# Patient Record
Sex: MALE | Race: WHITE | NOT HISPANIC OR LATINO | Employment: OTHER | URBAN - METROPOLITAN AREA
[De-identification: names, ages, dates, MRNs, and addresses within clinical notes are randomized per-mention and may not be internally consistent; named-entity substitution may affect disease eponyms.]

---

## 2023-03-21 ENCOUNTER — TELEPHONE (OUTPATIENT)
Dept: PRIMARY CARE | Facility: CLINIC | Age: 63
End: 2023-03-21
Payer: COMMERCIAL

## 2023-03-21 DIAGNOSIS — G89.4 CHRONIC PAIN SYNDROME: ICD-10-CM

## 2023-03-21 DIAGNOSIS — G89.29 CHRONIC BILATERAL LOW BACK PAIN WITHOUT SCIATICA: Primary | ICD-10-CM

## 2023-03-21 DIAGNOSIS — G89.4 CHRONIC PAIN SYNDROME: Primary | ICD-10-CM

## 2023-03-21 DIAGNOSIS — M54.50 CHRONIC BILATERAL LOW BACK PAIN WITHOUT SCIATICA: Primary | ICD-10-CM

## 2023-03-21 RX ORDER — HYDROCODONE BITARTRATE AND ACETAMINOPHEN 7.5; 325 MG/1; MG/1
1 TABLET ORAL 2 TIMES DAILY
COMMUNITY
Start: 2014-04-14 | End: 2023-03-21 | Stop reason: SDUPTHER

## 2023-03-21 RX ORDER — HYDROCODONE BITARTRATE AND ACETAMINOPHEN 7.5; 325 MG/1; MG/1
1 TABLET ORAL EVERY 6 HOURS PRN
Qty: 60 TABLET | Refills: 0 | Status: SHIPPED | OUTPATIENT
Start: 2023-03-21 | End: 2023-03-21 | Stop reason: SDUPTHER

## 2023-03-21 RX ORDER — HYDROCODONE BITARTRATE AND ACETAMINOPHEN 7.5; 325 MG/1; MG/1
1 TABLET ORAL 2 TIMES DAILY PRN
Qty: 60 TABLET | Refills: 0 | Status: SHIPPED | OUTPATIENT
Start: 2023-03-21 | End: 2023-04-19 | Stop reason: SDUPTHER

## 2023-03-25 LAB — SARS-COV-2 RESULT: NOT DETECTED

## 2023-03-27 LAB
ABO GROUP (TYPE) IN BLOOD: NORMAL
ANTIBODY SCREEN: NORMAL
RH FACTOR: NORMAL

## 2023-04-19 DIAGNOSIS — M54.50 CHRONIC BILATERAL LOW BACK PAIN WITHOUT SCIATICA: ICD-10-CM

## 2023-04-19 DIAGNOSIS — G89.29 CHRONIC BILATERAL LOW BACK PAIN WITHOUT SCIATICA: ICD-10-CM

## 2023-04-19 RX ORDER — HYDROCODONE BITARTRATE AND ACETAMINOPHEN 7.5; 325 MG/1; MG/1
1 TABLET ORAL 2 TIMES DAILY PRN
Qty: 60 TABLET | Refills: 0 | Status: SHIPPED | OUTPATIENT
Start: 2023-04-19 | End: 2023-05-19

## 2023-05-15 DIAGNOSIS — R09.81 NASAL CONGESTION: Primary | ICD-10-CM

## 2023-05-15 RX ORDER — FLUTICASONE PROPIONATE 50 MCG
2 SPRAY, SUSPENSION (ML) NASAL DAILY
Qty: 16 G | Refills: 1 | Status: SHIPPED | OUTPATIENT
Start: 2023-05-15

## 2023-05-15 RX ORDER — FLUTICASONE PROPIONATE 50 MCG
2 SPRAY, SUSPENSION (ML) NASAL DAILY
COMMUNITY
Start: 2023-02-17 | End: 2023-05-15 | Stop reason: SDUPTHER

## 2023-05-19 ENCOUNTER — OFFICE VISIT (OUTPATIENT)
Dept: PRIMARY CARE | Facility: CLINIC | Age: 63
End: 2023-05-19
Payer: COMMERCIAL

## 2023-05-19 VITALS
WEIGHT: 242 LBS | DIASTOLIC BLOOD PRESSURE: 70 MMHG | SYSTOLIC BLOOD PRESSURE: 142 MMHG | HEART RATE: 83 BPM | BODY MASS INDEX: 35.74 KG/M2 | OXYGEN SATURATION: 90 %

## 2023-05-19 DIAGNOSIS — I72.5 BASILAR ARTERY ANEURYSM (CMS-HCC): ICD-10-CM

## 2023-05-19 DIAGNOSIS — G89.29 CHRONIC BILATERAL LOW BACK PAIN WITHOUT SCIATICA: Primary | ICD-10-CM

## 2023-05-19 DIAGNOSIS — M54.50 CHRONIC BILATERAL LOW BACK PAIN WITHOUT SCIATICA: Primary | ICD-10-CM

## 2023-05-19 DIAGNOSIS — J43.2 CENTRILOBULAR EMPHYSEMA (MULTI): ICD-10-CM

## 2023-05-19 DIAGNOSIS — Z95.2 S/P TAVR (TRANSCATHETER AORTIC VALVE REPLACEMENT): ICD-10-CM

## 2023-05-19 DIAGNOSIS — I10 SYSTOLIC HYPERTENSION: ICD-10-CM

## 2023-05-19 DIAGNOSIS — D64.9 ANEMIA, UNSPECIFIED TYPE: ICD-10-CM

## 2023-05-19 DIAGNOSIS — I51.89 DIASTOLIC DYSFUNCTION: ICD-10-CM

## 2023-05-19 DIAGNOSIS — E66.09 CLASS 2 OBESITY DUE TO EXCESS CALORIES WITH BODY MASS INDEX (BMI) OF 35.0 TO 35.9 IN ADULT, UNSPECIFIED WHETHER SERIOUS COMORBIDITY PRESENT: ICD-10-CM

## 2023-05-19 DIAGNOSIS — R06.09 DOE (DYSPNEA ON EXERTION): ICD-10-CM

## 2023-05-19 PROBLEM — K43.9 HERNIA, LATERAL VENTRAL: Status: ACTIVE | Noted: 2023-05-19

## 2023-05-19 PROBLEM — N40.0 BPH (BENIGN PROSTATIC HYPERPLASIA): Status: ACTIVE | Noted: 2023-05-19

## 2023-05-19 PROBLEM — L90.0 LICHEN SCLEROSUS: Status: ACTIVE | Noted: 2023-05-19

## 2023-05-19 PROBLEM — R35.0 URINARY FREQUENCY: Status: ACTIVE | Noted: 2023-05-19

## 2023-05-19 PROBLEM — I20.9 ANGINA, CLASS III (CMS-HCC): Status: ACTIVE | Noted: 2023-05-19

## 2023-05-19 PROBLEM — D46.9 MYELODYSPLASTIC SYNDROME (MULTI): Status: ACTIVE | Noted: 2023-05-19

## 2023-05-19 PROBLEM — I50.33 ACUTE ON CHRONIC HEART FAILURE WITH PRESERVED EJECTION FRACTION (MULTI): Status: ACTIVE | Noted: 2023-05-19

## 2023-05-19 PROBLEM — I25.10 CORONARY ARTERY CALCIFICATION SEEN ON CT SCAN: Status: ACTIVE | Noted: 2023-05-19

## 2023-05-19 PROBLEM — M25.529 ELBOW PAIN: Status: ACTIVE | Noted: 2023-05-19

## 2023-05-19 PROBLEM — C61 ADENOCARCINOMA OF PROSTATE (MULTI): Status: ACTIVE | Noted: 2023-05-19

## 2023-05-19 PROBLEM — R56.9 SEIZURES (MULTI): Status: ACTIVE | Noted: 2023-05-19

## 2023-05-19 PROBLEM — R10.84 ABDOMINAL PAIN, DIFFUSE: Status: ACTIVE | Noted: 2023-05-19

## 2023-05-19 PROBLEM — L02.92 FURUNCLE: Status: ACTIVE | Noted: 2023-05-19

## 2023-05-19 PROBLEM — R53.83 FATIGUE: Status: ACTIVE | Noted: 2023-05-19

## 2023-05-19 PROBLEM — H49.00 THIRD CRANIAL NERVE PALSY: Status: ACTIVE | Noted: 2023-05-19

## 2023-05-19 PROBLEM — J44.9 COPD (CHRONIC OBSTRUCTIVE PULMONARY DISEASE) (MULTI): Status: ACTIVE | Noted: 2023-05-19

## 2023-05-19 PROBLEM — R73.9 ACUTE HYPERGLYCEMIA: Status: ACTIVE | Noted: 2023-05-19

## 2023-05-19 PROBLEM — K59.03 DRUG-INDUCED CONSTIPATION: Status: ACTIVE | Noted: 2023-05-19

## 2023-05-19 PROBLEM — R51.9 HEADACHE: Status: ACTIVE | Noted: 2023-05-19

## 2023-05-19 PROBLEM — H40.20X0 NARROW ANGLE GLAUCOMA OF RIGHT EYE: Status: ACTIVE | Noted: 2023-05-19

## 2023-05-19 PROBLEM — I35.0 AORTIC STENOSIS, SEVERE: Status: ACTIVE | Noted: 2023-05-19

## 2023-05-19 PROBLEM — I35.1 MODERATE AORTIC REGURGITATION: Status: ACTIVE | Noted: 2023-05-19

## 2023-05-19 PROBLEM — F41.8 DEPRESSION WITH ANXIETY: Status: ACTIVE | Noted: 2023-05-19

## 2023-05-19 PROBLEM — R97.20 ELEVATED PROSTATE SPECIFIC ANTIGEN (PSA): Status: ACTIVE | Noted: 2023-05-19

## 2023-05-19 PROBLEM — D69.6 THROMBOCYTOPENIA (CMS-HCC): Status: ACTIVE | Noted: 2023-05-19

## 2023-05-19 PROBLEM — G47.9 SLEEP DIFFICULTIES: Status: ACTIVE | Noted: 2023-05-19

## 2023-05-19 PROBLEM — R58 ECCHYMOSIS: Status: ACTIVE | Noted: 2023-05-19

## 2023-05-19 PROBLEM — L03.311 CELLULITIS OF ABDOMINAL WALL: Status: ACTIVE | Noted: 2023-05-19

## 2023-05-19 PROBLEM — E78.00 HYPERCHOLESTEREMIA: Status: ACTIVE | Noted: 2023-05-19

## 2023-05-19 PROBLEM — E53.8 VITAMIN B12 DEFICIENCY: Status: ACTIVE | Noted: 2023-05-19

## 2023-05-19 PROBLEM — K42.9 HERNIA, UMBILICAL: Status: ACTIVE | Noted: 2023-05-19

## 2023-05-19 PROBLEM — K21.9 ACID REFLUX: Status: ACTIVE | Noted: 2023-05-19

## 2023-05-19 PROBLEM — D50.9 IRON DEFICIENCY ANEMIA: Status: ACTIVE | Noted: 2023-05-19

## 2023-05-19 PROCEDURE — 1036F TOBACCO NON-USER: CPT | Performed by: STUDENT IN AN ORGANIZED HEALTH CARE EDUCATION/TRAINING PROGRAM

## 2023-05-19 PROCEDURE — 99214 OFFICE O/P EST MOD 30 MIN: CPT | Performed by: STUDENT IN AN ORGANIZED HEALTH CARE EDUCATION/TRAINING PROGRAM

## 2023-05-19 PROCEDURE — 3078F DIAST BP <80 MM HG: CPT | Performed by: STUDENT IN AN ORGANIZED HEALTH CARE EDUCATION/TRAINING PROGRAM

## 2023-05-19 PROCEDURE — 3077F SYST BP >= 140 MM HG: CPT | Performed by: STUDENT IN AN ORGANIZED HEALTH CARE EDUCATION/TRAINING PROGRAM

## 2023-05-19 PROCEDURE — 3008F BODY MASS INDEX DOCD: CPT | Performed by: STUDENT IN AN ORGANIZED HEALTH CARE EDUCATION/TRAINING PROGRAM

## 2023-05-19 RX ORDER — ATORVASTATIN CALCIUM 20 MG/1
20 TABLET, FILM COATED ORAL
COMMUNITY
End: 2024-01-30

## 2023-05-19 RX ORDER — ASPIRIN 81 MG/1
1 TABLET ORAL DAILY
COMMUNITY

## 2023-05-19 RX ORDER — TORSEMIDE 20 MG/1
1 TABLET ORAL 2 TIMES DAILY
COMMUNITY
Start: 2021-05-20

## 2023-05-19 RX ORDER — HYDROCODONE BITARTRATE AND ACETAMINOPHEN 10; 325 MG/1; MG/1
1 TABLET ORAL 3 TIMES DAILY PRN
Qty: 90 TABLET | Refills: 0 | Status: SHIPPED | OUTPATIENT
Start: 2023-05-19 | End: 2023-05-26

## 2023-05-19 RX ORDER — FLUTICASONE FUROATE, UMECLIDINIUM BROMIDE AND VILANTEROL TRIFENATATE 100; 62.5; 25 UG/1; UG/1; UG/1
POWDER RESPIRATORY (INHALATION)
COMMUNITY
Start: 2022-10-03

## 2023-05-19 RX ORDER — LOSARTAN POTASSIUM 100 MG/1
100 TABLET ORAL
COMMUNITY
End: 2024-01-30

## 2023-05-19 RX ORDER — SPIRONOLACTONE 25 MG/1
1 TABLET ORAL DAILY
COMMUNITY
Start: 2023-03-16 | End: 2023-08-25 | Stop reason: SDUPTHER

## 2023-05-19 RX ORDER — HYDROCODONE BITARTRATE AND ACETAMINOPHEN 7.5; 325 MG/1; MG/1
1 TABLET ORAL 2 TIMES DAILY PRN
Qty: 60 TABLET | Refills: 0 | Status: CANCELLED | OUTPATIENT
Start: 2023-05-19 | End: 2023-06-18

## 2023-05-19 ASSESSMENT — ENCOUNTER SYMPTOMS
LIGHT-HEADEDNESS: 0
NERVOUS/ANXIOUS: 0
ARTHRALGIAS: 1
SHORTNESS OF BREATH: 1
SLEEP DISTURBANCE: 1
FATIGUE: 0
WHEEZING: 0
DIZZINESS: 0
BACK PAIN: 1
HEADACHES: 0
CHEST TIGHTNESS: 0
FREQUENCY: 1

## 2023-05-19 ASSESSMENT — PATIENT HEALTH QUESTIONNAIRE - PHQ9
1. LITTLE INTEREST OR PLEASURE IN DOING THINGS: NOT AT ALL
2. FEELING DOWN, DEPRESSED OR HOPELESS: NOT AT ALL
SUM OF ALL RESPONSES TO PHQ9 QUESTIONS 1 AND 2: 0

## 2023-05-19 NOTE — PROGRESS NOTES
Subjective   Patient ID: Jose Gaviria is a 62 y.o. male who presents for Follow-up (3 month follow up visit).  Had valve replacement March 28. Some improvement in breathing but overall says he still has significant dyspnea on exertion. Has been trying to be active with housework and yard work.     Recently saw hematologist. Anemia has been improving s/p TAVR    Nocturia every 1.5 hours. Has tried meds for this, no change.     Has tried lidocaine patches, no benefit    Has been on opiate pain medication for over 10 years. Currently on 7.5mg BID. Lasts about 4-5 hours. Takes his pain from about 7-8/10 to 5/10. Tolerates norco better then percocet. Interested in possibly increasing his dose for better pain control and improved functioning.           Opioids:  What is the patient's goal of therapy? Improved pain and functioning  Is this being achieved with current treatment? Yes    I have calculated the patient's Morphine Dose Equivalent (MED): 30  I have considered referral to Pain Management and/or a specialist, and do not feel it is necessary at this time.    I feel that it is clinically indicated to continue this current medication regimen after consideration of alternative therapies, and other non-opioid treatment.      Review of Systems   Constitutional:  Negative for fatigue.   HENT: Negative.     Respiratory:  Positive for shortness of breath. Negative for chest tightness and wheezing.    Cardiovascular:  Negative for chest pain.   Genitourinary:  Positive for frequency.   Musculoskeletal:  Positive for arthralgias and back pain.   Neurological:  Negative for dizziness, light-headedness and headaches.   Psychiatric/Behavioral:  Positive for sleep disturbance. The patient is not nervous/anxious.        Objective   Physical Exam  Vitals reviewed.   Constitutional:       Appearance: He is obese.   HENT:      Head: Normocephalic.   Eyes:      Pupils: Pupils are equal, round, and reactive to light.   Cardiovascular:       Rate and Rhythm: Normal rate and regular rhythm.   Pulmonary:      Effort: Pulmonary effort is normal. No respiratory distress.      Breath sounds: Normal breath sounds. No wheezing or rhonchi.   Musculoskeletal:         General: Normal range of motion.   Skin:     General: Skin is warm and dry.   Neurological:      General: No focal deficit present.      Mental Status: He is alert. Mental status is at baseline.   Psychiatric:         Mood and Affect: Mood normal.         Behavior: Behavior normal.           Current Outpatient Medications:     aspirin 81 mg EC tablet, Take 1 tablet (81 mg) by mouth once daily., Disp: , Rfl:     aspirin-calcium carbonate 81 mg-300 mg calcium(777 mg) tablet, Take 1 tablet by mouth once daily. TAKE 1 TABLET DAILY., Disp: , Rfl:     atorvastatin (Lipitor) 20 mg tablet, Take 1 tablet (20 mg) by mouth once daily., Disp: , Rfl:     fluticasone (Flonase) 50 mcg/actuation nasal spray, Administer 2 sprays into each nostril once daily., Disp: 16 g, Rfl: 1    losartan (Cozaar) 100 mg tablet, Take 1 tablet (100 mg) by mouth once daily., Disp: , Rfl:     spironolactone (Aldactone) 25 mg tablet, Take 1 tablet (25 mg) by mouth once daily., Disp: , Rfl:     torsemide (Demadex) 20 mg tablet, Take 1 tablet (20 mg) by mouth once daily., Disp: , Rfl:     Trelegy Ellipta 100-62.5-25 mcg blister with device, Inhale., Disp: , Rfl:     HYDROcodone-acetaminophen (Norco)  mg tablet, Take 1 tablet by mouth 3 times a day as needed for severe pain (7 - 10) for up to 7 days., Disp: 90 tablet, Rfl: 0      Assessment/Plan   Diagnoses and all orders for this visit:  Chronic bilateral low back pain without sciatica  -     HYDROcodone-acetaminophen (Norco)  mg tablet; Take 1 tablet by mouth 3 times a day as needed for severe pain (7 - 10) for up to 7 days.  S/P TAVR (transcatheter aortic valve replacement)  Systolic hypertension  Diastolic dysfunction  Basilar artery aneurysm (CMS/HCC)  KENDALL (dyspnea  on exertion)  Centrilobular emphysema (CMS/HCC)  Comments:  no significant changes on trelegy  Anemia, unspecified type  Comments:  improving s/p TAVR  Class 2 obesity due to excess calories with body mass index (BMI) of 35.0 to 35.9 in adult, unspecified whether serious comorbidity present    The patient received Provided instructions on exercise because they have an above normal BMI.    OARRS report reviewed for Jose Gaviria today and is consistent with prescribed therapy.  We weighed the risks and benefit of this therapy and will continue with the current plan      Follow up in 3 months    Li Cloud, DO

## 2023-06-15 DIAGNOSIS — G89.29 CHRONIC BILATERAL LOW BACK PAIN WITHOUT SCIATICA: Primary | ICD-10-CM

## 2023-06-15 DIAGNOSIS — M54.50 CHRONIC BILATERAL LOW BACK PAIN WITHOUT SCIATICA: Primary | ICD-10-CM

## 2023-06-15 RX ORDER — HYDROCODONE BITARTRATE AND ACETAMINOPHEN 10; 325 MG/1; MG/1
1 TABLET ORAL 3 TIMES DAILY
Qty: 90 TABLET | Refills: 0 | Status: SHIPPED | OUTPATIENT
Start: 2023-06-15 | End: 2023-07-13 | Stop reason: SDUPTHER

## 2023-06-15 NOTE — TELEPHONE ENCOUNTER
Patient called for a refill of hydrocodone  , 3x a day. I do not see this dose on his chart.   Will you refill this?

## 2023-07-13 DIAGNOSIS — G89.29 CHRONIC BILATERAL LOW BACK PAIN WITHOUT SCIATICA: ICD-10-CM

## 2023-07-13 DIAGNOSIS — M54.50 CHRONIC BILATERAL LOW BACK PAIN WITHOUT SCIATICA: ICD-10-CM

## 2023-07-13 RX ORDER — HYDROCODONE BITARTRATE AND ACETAMINOPHEN 10; 325 MG/1; MG/1
1 TABLET ORAL 3 TIMES DAILY
Qty: 90 TABLET | Refills: 0 | Status: SHIPPED | OUTPATIENT
Start: 2023-07-13 | End: 2023-08-15 | Stop reason: SDUPTHER

## 2023-08-15 DIAGNOSIS — G89.29 CHRONIC BILATERAL LOW BACK PAIN WITHOUT SCIATICA: ICD-10-CM

## 2023-08-15 DIAGNOSIS — M54.50 CHRONIC BILATERAL LOW BACK PAIN WITHOUT SCIATICA: ICD-10-CM

## 2023-08-15 RX ORDER — HYDROCODONE BITARTRATE AND ACETAMINOPHEN 10; 325 MG/1; MG/1
1 TABLET ORAL 3 TIMES DAILY
Qty: 90 TABLET | Refills: 0 | Status: SHIPPED | OUTPATIENT
Start: 2023-08-15 | End: 2023-09-13 | Stop reason: SDUPTHER

## 2023-08-25 ENCOUNTER — OFFICE VISIT (OUTPATIENT)
Dept: PRIMARY CARE | Facility: CLINIC | Age: 63
End: 2023-08-25
Payer: COMMERCIAL

## 2023-08-25 VITALS
DIASTOLIC BLOOD PRESSURE: 78 MMHG | HEART RATE: 69 BPM | WEIGHT: 241 LBS | SYSTOLIC BLOOD PRESSURE: 132 MMHG | OXYGEN SATURATION: 94 % | BODY MASS INDEX: 35.59 KG/M2

## 2023-08-25 DIAGNOSIS — I20.9 ANGINA, CLASS III (CMS-HCC): ICD-10-CM

## 2023-08-25 DIAGNOSIS — E66.01 OBESITY, MORBID (MULTI): ICD-10-CM

## 2023-08-25 DIAGNOSIS — D50.0 IRON DEFICIENCY ANEMIA DUE TO CHRONIC BLOOD LOSS: ICD-10-CM

## 2023-08-25 DIAGNOSIS — G89.4 CHRONIC PAIN SYNDROME: Primary | ICD-10-CM

## 2023-08-25 DIAGNOSIS — R06.09 DOE (DYSPNEA ON EXERTION): ICD-10-CM

## 2023-08-25 DIAGNOSIS — C61 ADENOCARCINOMA OF PROSTATE (MULTI): ICD-10-CM

## 2023-08-25 DIAGNOSIS — E66.09 CLASS 2 OBESITY DUE TO EXCESS CALORIES WITH BODY MASS INDEX (BMI) OF 35.0 TO 35.9 IN ADULT, UNSPECIFIED WHETHER SERIOUS COMORBIDITY PRESENT: ICD-10-CM

## 2023-08-25 DIAGNOSIS — Z95.2 S/P TAVR (TRANSCATHETER AORTIC VALVE REPLACEMENT): ICD-10-CM

## 2023-08-25 DIAGNOSIS — G89.29 CHRONIC BILATERAL LOW BACK PAIN WITHOUT SCIATICA: ICD-10-CM

## 2023-08-25 DIAGNOSIS — M54.50 CHRONIC BILATERAL LOW BACK PAIN WITHOUT SCIATICA: ICD-10-CM

## 2023-08-25 DIAGNOSIS — R56.9 SEIZURES (MULTI): ICD-10-CM

## 2023-08-25 DIAGNOSIS — R53.82 CHRONIC FATIGUE: ICD-10-CM

## 2023-08-25 DIAGNOSIS — I51.89 DIASTOLIC DYSFUNCTION: ICD-10-CM

## 2023-08-25 DIAGNOSIS — E11.9 TYPE 2 DIABETES MELLITUS WITHOUT COMPLICATION, WITHOUT LONG-TERM CURRENT USE OF INSULIN (MULTI): ICD-10-CM

## 2023-08-25 PROCEDURE — 80358 DRUG SCREENING METHADONE: CPT

## 2023-08-25 PROCEDURE — 80368 SEDATIVE HYPNOTICS: CPT

## 2023-08-25 PROCEDURE — 80307 DRUG TEST PRSMV CHEM ANLYZR: CPT

## 2023-08-25 PROCEDURE — 80365 DRUG SCREENING OXYCODONE: CPT

## 2023-08-25 PROCEDURE — 99214 OFFICE O/P EST MOD 30 MIN: CPT | Performed by: STUDENT IN AN ORGANIZED HEALTH CARE EDUCATION/TRAINING PROGRAM

## 2023-08-25 PROCEDURE — 3078F DIAST BP <80 MM HG: CPT | Performed by: STUDENT IN AN ORGANIZED HEALTH CARE EDUCATION/TRAINING PROGRAM

## 2023-08-25 PROCEDURE — 3008F BODY MASS INDEX DOCD: CPT | Performed by: STUDENT IN AN ORGANIZED HEALTH CARE EDUCATION/TRAINING PROGRAM

## 2023-08-25 PROCEDURE — 80346 BENZODIAZEPINES1-12: CPT

## 2023-08-25 PROCEDURE — 4010F ACE/ARB THERAPY RXD/TAKEN: CPT | Performed by: STUDENT IN AN ORGANIZED HEALTH CARE EDUCATION/TRAINING PROGRAM

## 2023-08-25 PROCEDURE — 3075F SYST BP GE 130 - 139MM HG: CPT | Performed by: STUDENT IN AN ORGANIZED HEALTH CARE EDUCATION/TRAINING PROGRAM

## 2023-08-25 PROCEDURE — 1036F TOBACCO NON-USER: CPT | Performed by: STUDENT IN AN ORGANIZED HEALTH CARE EDUCATION/TRAINING PROGRAM

## 2023-08-25 PROCEDURE — 80373 DRUG SCREENING TRAMADOL: CPT

## 2023-08-25 PROCEDURE — 80361 OPIATES 1 OR MORE: CPT

## 2023-08-25 PROCEDURE — 3044F HG A1C LEVEL LT 7.0%: CPT | Performed by: STUDENT IN AN ORGANIZED HEALTH CARE EDUCATION/TRAINING PROGRAM

## 2023-08-25 PROCEDURE — 80354 DRUG SCREENING FENTANYL: CPT

## 2023-08-25 RX ORDER — SPIRONOLACTONE 25 MG/1
25 TABLET ORAL DAILY
Qty: 90 TABLET | Refills: 3 | Status: SHIPPED | OUTPATIENT
Start: 2023-08-25

## 2023-08-25 ASSESSMENT — PATIENT HEALTH QUESTIONNAIRE - PHQ9
1. LITTLE INTEREST OR PLEASURE IN DOING THINGS: NOT AT ALL
SUM OF ALL RESPONSES TO PHQ9 QUESTIONS 1 AND 2: 0
2. FEELING DOWN, DEPRESSED OR HOPELESS: NOT AT ALL

## 2023-08-25 NOTE — PROGRESS NOTES
Subjective   Patient ID: Crow Gaviria is a 63 y.o. male who presents for Follow-up (3 month follow up /UDS today).  HPI  He reports intermittent fatigue for couple of months.  It is static, no aggravating or alleviating factors.  He reports his pain has improved since increasing the dose of hydrocodone but reports mild constipation. Has not tried anything for this.     Sees hematology for his chronic iron deficiency anemia. Has been improving.     Review of Systems  No chest pain, cough, SOB, loss of appetite, rashes, dizziness or palpitation.  Negative systemic review    Objective     He looks well, oriented and in no distress  Respiratory: Equal air entry bilaterally no rhonchi or wheeze  Cardiovascular: Normal S1-S2, no murmurs.  Abdominal: Soft, distended, no tenderness, bruise.at the center No organomegaly.    Assessment/Plan   Diagnoses and all orders for this visit:  Chronic pain syndrome  Comments:  Doing better on 10 mg dose of Percocet 3 times daily.  Says pain is well controlled and he is able to sleep better.  No side effects  Update UDS today  Orders:  -     Opiate/Opioid/Benzo Extended Prescription Compliance  Chronic bilateral low back pain without sciatica  Type 2 diabetes mellitus without complication, without long-term current use of insulin (CMS/[facility])  Comments:  Last A1c less than 5 6 months ago  Repeat today  Orders:  -     Hemoglobin A1c; Future  Class 2 obesity due to excess calories with body mass index (BMI) of 35.0 to 35.9 in adult, unspecified whether serious comorbidity present  Diastolic dysfunction  Comments:  Euvolemic, no lower extremity edema  Orders:  -     spironolactone (Aldactone) 25 mg tablet; Take 1 tablet (25 mg) by mouth once daily.  Obesity, morbid (CMS/HCC)  Seizures (CMS/HCC)  Comments:  None reported  Adenocarcinoma of prostate (CMS/HCC)  Angina, class III (CMS/HCC)  Comments:  stable  follows with cardiology  S/P TAVR (transcatheter aortic valve  replacement)  Comments:  Reports he does not notice any improvement  KENDALL (dyspnea on exertion)  Chronic fatigue  Comments:  Chronic anemia due to aortic valve  Has been improving  Iron deficiency anemia due to chronic blood loss  Comments:  Follows with hematology  Recent labs showed improvement    Follow up in 3 months    OARRS report reviewed for Jose Gaviria today and is consistent with prescribed therapy.  We weighed the risks and benefit of this therapy and will continue with the current plan      The patient received Provided instructions on exercise because they have an above normal BMI.    I saw and evaluated the patient. I personally obtained the key and critical portions of the history and physical exam or was physically present for key and critical portions performed by the trainee. I reviewed the trainee's documentation and discussed the patient with the trainee. I agree with the trainee's medical decision making, as documented on the trainee's note.      Li Cloud, DO

## 2023-08-30 LAB
6-ACETYLMORPHINE: <25 NG/ML
7-AMINOCLONAZEPAM: <25 NG/ML
ALPHA-HYDROXYALPRAZOLAM: <25 NG/ML
ALPHA-HYDROXYMIDAZOLAM: <25 NG/ML
ALPRAZOLAM: <25 NG/ML
AMPHETAMINE (PRESENCE) IN URINE BY SCREEN METHOD: ABNORMAL
BARBITURATES PRESENCE IN URINE BY SCREEN METHOD: ABNORMAL
CANNABINOIDS IN URINE BY SCREEN METHOD: ABNORMAL
CHLORDIAZEPOXIDE: <25 NG/ML
CLONAZEPAM: <25 NG/ML
COCAINE (PRESENCE) IN URINE BY SCREEN METHOD: ABNORMAL
CODEINE: <50 NG/ML
CREATINE, URINE FOR DRUG: 72.2 MG/DL
DIAZEPAM: <25 NG/ML
DRUG SCREEN COMMENT URINE: ABNORMAL
EDDP: <25 NG/ML
FENTANYL CONFIRMATION, URINE: <2.5 NG/ML
HYDROCODONE: 2420 NG/ML
HYDROMORPHONE: 721 NG/ML
LORAZEPAM: <25 NG/ML
METHADONE CONFIRMATION,URINE: <25 NG/ML
MIDAZOLAM: <25 NG/ML
MORPHINE URINE: <50 NG/ML
NORDIAZEPAM: <25 NG/ML
NORFENTANYL: <2.5 NG/ML
NORHYDROCODONE: >1000 NG/ML
NOROXYCODONE: <25 NG/ML
O-DESMETHYLTRAMADOL: <50 NG/ML
OXAZEPAM: <25 NG/ML
OXYCODONE: <25 NG/ML
OXYMORPHONE: <25 NG/ML
PHENCYCLIDINE (PRESENCE) IN URINE BY SCREEN METHOD: ABNORMAL
TEMAZEPAM: <25 NG/ML
TRAMADOL: <50 NG/ML
ZOLPIDEM METABOLITE (ZCA): <25 NG/ML
ZOLPIDEM: <25 NG/ML

## 2023-09-13 DIAGNOSIS — M54.50 CHRONIC BILATERAL LOW BACK PAIN WITHOUT SCIATICA: ICD-10-CM

## 2023-09-13 DIAGNOSIS — G89.29 CHRONIC BILATERAL LOW BACK PAIN WITHOUT SCIATICA: ICD-10-CM

## 2023-09-13 RX ORDER — HYDROCODONE BITARTRATE AND ACETAMINOPHEN 10; 325 MG/1; MG/1
1 TABLET ORAL 3 TIMES DAILY
Qty: 90 TABLET | Refills: 0 | Status: SHIPPED | OUTPATIENT
Start: 2023-09-13 | End: 2023-09-25

## 2023-09-25 ENCOUNTER — TELEPHONE (OUTPATIENT)
Dept: PRIMARY CARE | Facility: CLINIC | Age: 63
End: 2023-09-25
Payer: COMMERCIAL

## 2023-09-25 DIAGNOSIS — G89.29 CHRONIC BILATERAL LOW BACK PAIN WITHOUT SCIATICA: Primary | ICD-10-CM

## 2023-09-25 DIAGNOSIS — M54.50 CHRONIC BILATERAL LOW BACK PAIN WITHOUT SCIATICA: Primary | ICD-10-CM

## 2023-09-25 DIAGNOSIS — G89.4 CHRONIC PAIN SYNDROME: ICD-10-CM

## 2023-09-25 RX ORDER — HYDROCODONE BITARTRATE AND ACETAMINOPHEN 5; 325 MG/1; MG/1
2 TABLET ORAL 3 TIMES DAILY PRN
Qty: 180 TABLET | Refills: 0 | Status: SHIPPED | OUTPATIENT
Start: 2023-09-25 | End: 2023-10-19 | Stop reason: SDUPTHER

## 2023-09-25 NOTE — TELEPHONE ENCOUNTER
Patients Hydrocodone 10 mg have been on backorder. Their pharmacy has 5mg tabs. Can you send an rx for the 5 mg pills to equal his normal dosage daily?   Thank you

## 2023-10-18 DIAGNOSIS — E53.8 VITAMIN B12 DEFICIENCY: ICD-10-CM

## 2023-10-18 DIAGNOSIS — D46.9 MYELODYSPLASTIC SYNDROME (MULTI): ICD-10-CM

## 2023-10-18 DIAGNOSIS — D69.6 THROMBOCYTOPENIA (CMS-HCC): Primary | ICD-10-CM

## 2023-10-19 DIAGNOSIS — M54.50 CHRONIC BILATERAL LOW BACK PAIN WITHOUT SCIATICA: ICD-10-CM

## 2023-10-19 DIAGNOSIS — G89.29 CHRONIC BILATERAL LOW BACK PAIN WITHOUT SCIATICA: ICD-10-CM

## 2023-10-19 DIAGNOSIS — G89.4 CHRONIC PAIN SYNDROME: ICD-10-CM

## 2023-10-19 RX ORDER — HYDROCODONE BITARTRATE AND ACETAMINOPHEN 5; 325 MG/1; MG/1
2 TABLET ORAL 3 TIMES DAILY PRN
Qty: 180 TABLET | Refills: 0 | Status: SHIPPED | OUTPATIENT
Start: 2023-10-19 | End: 2023-11-17 | Stop reason: SDUPTHER

## 2023-10-20 PROBLEM — F41.1 ANXIETY STATE: Status: ACTIVE | Noted: 2018-08-03

## 2023-10-20 PROBLEM — R56.9 SEIZURE (MULTI): Status: ACTIVE | Noted: 2018-11-22

## 2023-10-20 PROBLEM — D61.818 PANCYTOPENIA (MULTI): Status: ACTIVE | Noted: 2023-10-20

## 2023-10-20 PROBLEM — R39.15 URGENCY OF URINATION: Status: ACTIVE | Noted: 2023-10-20

## 2023-10-20 PROBLEM — S61.209D: Status: ACTIVE | Noted: 2023-10-20

## 2023-10-20 PROBLEM — E66.9 OBESITY, CLASS II, BMI 35-39.9: Status: ACTIVE | Noted: 2018-08-03

## 2023-10-20 PROBLEM — Z79.891 LONG TERM CURRENT USE OF OPIATE ANALGESIC: Status: ACTIVE | Noted: 2023-10-20

## 2023-10-20 PROBLEM — I72.9 ANEURYSM (CMS-HCC): Status: ACTIVE | Noted: 2019-02-11

## 2023-10-20 PROBLEM — Z86.79 HISTORY OF AORTIC STENOSIS: Status: ACTIVE | Noted: 2023-10-20

## 2023-10-20 PROBLEM — R55 SYNCOPE: Status: ACTIVE | Noted: 2018-08-02

## 2023-10-20 PROBLEM — I10 BENIGN ESSENTIAL HYPERTENSION: Status: ACTIVE | Noted: 2018-08-03

## 2023-10-20 PROBLEM — I10 HYPERTENSION: Status: ACTIVE | Noted: 2023-10-20

## 2023-10-20 PROBLEM — Z87.891 HISTORY OF TOBACCO ABUSE: Status: ACTIVE | Noted: 2023-10-20

## 2023-10-20 PROBLEM — I67.1 BRAIN ANEURYSM (HHS-HCC): Status: ACTIVE | Noted: 2018-09-20

## 2023-10-20 PROBLEM — F17.200 TOBACCO DEPENDENCE SYNDROME: Status: ACTIVE | Noted: 2018-08-03

## 2023-10-20 PROBLEM — F34.1 DYSTHYMIA: Status: ACTIVE | Noted: 2019-09-16

## 2023-10-20 PROBLEM — E66.812 CLASS 2 OBESITY WITH BODY MASS INDEX (BMI) OF 36.0 TO 36.9 IN ADULT: Status: ACTIVE | Noted: 2023-10-20

## 2023-10-20 PROBLEM — I67.1 UNRUPTURED CEREBRAL ANEURYSM (HHS-HCC): Status: ACTIVE | Noted: 2017-09-10

## 2023-10-20 PROBLEM — I35.0 AORTIC STENOSIS: Status: ACTIVE | Noted: 2023-10-20

## 2023-10-20 PROBLEM — E78.00 PURE HYPERCHOLESTEROLEMIA: Status: ACTIVE | Noted: 2018-08-03

## 2023-10-20 PROBLEM — E66.9 CLASS 2 OBESITY WITH BODY MASS INDEX (BMI) OF 36.0 TO 36.9 IN ADULT: Status: ACTIVE | Noted: 2023-10-20

## 2023-10-20 PROBLEM — E11.9 TYPE 2 DIABETES MELLITUS WITHOUT COMPLICATIONS (MULTI): Status: ACTIVE | Noted: 2018-08-03

## 2023-10-20 PROBLEM — I10 ESSENTIAL HYPERTENSION: Status: ACTIVE | Noted: 2018-08-03

## 2023-10-20 PROBLEM — E66.812 OBESITY, CLASS II, BMI 35-39.9: Status: ACTIVE | Noted: 2018-08-03

## 2023-10-20 PROBLEM — G40.209 LOCALIZATION-RELATED (FOCAL) (PARTIAL) SYMPTOMATIC EPILEPSY AND EPILEPTIC SYNDROMES WITH COMPLEX PARTIAL SEIZURES, NOT INTRACTABLE, WITHOUT STATUS EPILEPTICUS (MULTI): Status: ACTIVE | Noted: 2018-11-29

## 2023-10-20 PROBLEM — R61 UNEXPLAINED NIGHT SWEATS: Status: ACTIVE | Noted: 2023-10-20

## 2023-10-20 PROBLEM — B99.9 LOCALIZED INFECTION: Status: ACTIVE | Noted: 2023-10-20

## 2023-10-20 PROBLEM — I67.1 INTRACRANIAL ANEURYSM (HHS-HCC): Status: ACTIVE | Noted: 2018-08-13

## 2023-10-20 PROBLEM — R06.2 WHEEZE: Status: ACTIVE | Noted: 2023-10-20

## 2023-10-20 PROBLEM — L08.9 SKIN INFECTION: Status: ACTIVE | Noted: 2023-10-20

## 2023-10-20 PROBLEM — C44.92 SCC (SQUAMOUS CELL CARCINOMA): Status: ACTIVE | Noted: 2023-10-20

## 2023-10-20 PROBLEM — H53.2 TRANSIENT DIPLOPIA: Status: ACTIVE | Noted: 2019-04-09

## 2023-10-20 RX ORDER — TORSEMIDE 10 MG/1
TABLET ORAL
COMMUNITY
Start: 2021-05-20 | End: 2024-02-23 | Stop reason: DRUGHIGH

## 2023-10-20 RX ORDER — IBUPROFEN 600 MG/1
600 TABLET ORAL EVERY 6 HOURS PRN
COMMUNITY
Start: 2019-12-16

## 2023-10-20 RX ORDER — OXYCODONE AND ACETAMINOPHEN 7.5; 325 MG/1; MG/1
1 TABLET ORAL 2 TIMES DAILY PRN
COMMUNITY
End: 2023-11-17 | Stop reason: ALTCHOICE

## 2023-10-20 RX ORDER — ALBUTEROL SULFATE 90 UG/1
2 AEROSOL, METERED RESPIRATORY (INHALATION) AS NEEDED
COMMUNITY
Start: 2018-04-30

## 2023-10-20 RX ORDER — UMECLIDINIUM 62.5 UG/1
1 AEROSOL, POWDER ORAL DAILY
COMMUNITY
Start: 2020-06-01 | End: 2024-02-23 | Stop reason: ALTCHOICE

## 2023-10-20 RX ORDER — OXCARBAZEPINE 150 MG/1
450 TABLET, FILM COATED ORAL 2 TIMES DAILY
COMMUNITY
Start: 2019-09-16 | End: 2024-02-23 | Stop reason: ALTCHOICE

## 2023-10-20 RX ORDER — NALOXONE HYDROCHLORIDE 4 MG/.1ML
1 SPRAY NASAL ONCE
COMMUNITY
Start: 2020-05-13

## 2023-10-20 RX ORDER — ALBUTEROL SULFATE 0.83 MG/ML
SOLUTION RESPIRATORY (INHALATION) AS NEEDED
COMMUNITY
Start: 2020-06-01

## 2023-10-20 RX ORDER — MULTIVIT-MIN/IRON FUM/FOLIC AC 7.5 MG-4
1 TABLET ORAL DAILY
COMMUNITY

## 2023-10-20 RX ORDER — TIOTROPIUM BROMIDE 18 UG/1
1 CAPSULE ORAL; RESPIRATORY (INHALATION)
COMMUNITY
Start: 2018-06-10

## 2023-10-20 RX ORDER — FERROUS SULFATE 325(65) MG
1 TABLET, DELAYED RELEASE (ENTERIC COATED) ORAL DAILY
COMMUNITY
End: 2023-10-30 | Stop reason: SDUPTHER

## 2023-10-20 RX ORDER — HYDROCODONE BITARTRATE AND ACETAMINOPHEN 7.5; 325 MG/1; MG/1
1 TABLET ORAL 2 TIMES DAILY PRN
COMMUNITY
Start: 2014-04-14 | End: 2023-11-17 | Stop reason: ALTCHOICE

## 2023-10-20 RX ORDER — DULOXETIN HYDROCHLORIDE 20 MG/1
20 CAPSULE, DELAYED RELEASE ORAL DAILY
COMMUNITY
Start: 2019-09-16

## 2023-10-23 ENCOUNTER — LAB (OUTPATIENT)
Dept: LAB | Facility: CLINIC | Age: 63
End: 2023-10-23
Payer: COMMERCIAL

## 2023-10-23 DIAGNOSIS — D69.6 THROMBOCYTOPENIA (CMS-HCC): ICD-10-CM

## 2023-10-23 DIAGNOSIS — D46.9 MYELODYSPLASTIC SYNDROME (MULTI): ICD-10-CM

## 2023-10-23 DIAGNOSIS — E53.8 VITAMIN B12 DEFICIENCY: ICD-10-CM

## 2023-10-23 DIAGNOSIS — E11.9 TYPE 2 DIABETES MELLITUS WITHOUT COMPLICATION, WITHOUT LONG-TERM CURRENT USE OF INSULIN (MULTI): ICD-10-CM

## 2023-10-23 LAB
ALBUMIN SERPL BCP-MCNC: 3.8 G/DL (ref 3.4–5)
ALP SERPL-CCNC: 87 U/L (ref 33–136)
ALT SERPL W P-5'-P-CCNC: 16 U/L (ref 10–52)
ANION GAP SERPL CALC-SCNC: 13 MMOL/L (ref 10–20)
AST SERPL W P-5'-P-CCNC: 21 U/L (ref 9–39)
BASOPHILS # BLD MANUAL: 0 X10*3/UL (ref 0–0.1)
BASOPHILS NFR BLD MANUAL: 0 %
BILIRUB SERPL-MCNC: 0.5 MG/DL (ref 0–1.2)
BUN SERPL-MCNC: 18 MG/DL (ref 6–23)
CALCIUM SERPL-MCNC: 8.2 MG/DL (ref 8.6–10.3)
CHLORIDE SERPL-SCNC: 109 MMOL/L (ref 98–107)
CO2 SERPL-SCNC: 21 MMOL/L (ref 21–32)
CREAT SERPL-MCNC: 0.94 MG/DL (ref 0.5–1.3)
DACRYOCYTES BLD QL SMEAR: ABNORMAL
EOSINOPHIL # BLD MANUAL: 0.12 X10*3/UL (ref 0–0.7)
EOSINOPHIL NFR BLD MANUAL: 4 %
ERYTHROCYTE [DISTWIDTH] IN BLOOD BY AUTOMATED COUNT: 18.6 % (ref 11.5–14.5)
EST. AVERAGE GLUCOSE BLD GHB EST-MCNC: 105 MG/DL
FERRITIN SERPL-MCNC: 61 NG/ML (ref 20–300)
FOLATE SERPL-MCNC: 23 NG/ML
GFR SERPL CREATININE-BSD FRML MDRD: >90 ML/MIN/1.73M*2
GLUCOSE SERPL-MCNC: 101 MG/DL (ref 74–99)
HBA1C MFR BLD: 5.3 %
HCT VFR BLD AUTO: 36.7 % (ref 41–52)
HGB BLD-MCNC: 11.7 G/DL (ref 13.5–17.5)
IMM GRANULOCYTES # BLD AUTO: 0.06 X10*3/UL (ref 0–0.7)
IMM GRANULOCYTES NFR BLD AUTO: 2 % (ref 0–0.9)
IRON SATN MFR SERPL: 10 % (ref 25–45)
IRON SERPL-MCNC: 43 UG/DL (ref 35–150)
LDH SERPL L TO P-CCNC: 234 U/L (ref 84–246)
LYMPHOCYTES # BLD MANUAL: 1.41 X10*3/UL (ref 1.2–4.8)
LYMPHOCYTES NFR BLD MANUAL: 47 %
MCH RBC QN AUTO: 34.3 PG (ref 26–34)
MCHC RBC AUTO-ENTMCNC: 31.9 G/DL (ref 32–36)
MCV RBC AUTO: 108 FL (ref 80–100)
METAMYELOCYTES # BLD MANUAL: 0.06 X10*3/UL
METAMYELOCYTES NFR BLD MANUAL: 2 %
MONOCYTES # BLD MANUAL: 0.36 X10*3/UL (ref 0.1–1)
MONOCYTES NFR BLD MANUAL: 12 %
NEUTROPHILS # BLD MANUAL: 1.05 X10*3/UL (ref 1.2–7.7)
NEUTS BAND # BLD MANUAL: 0.03 X10*3/UL (ref 0–0.7)
NEUTS BAND NFR BLD MANUAL: 1 %
NEUTS SEG # BLD MANUAL: 1.02 X10*3/UL (ref 1.2–7)
NEUTS SEG NFR BLD MANUAL: 34 %
NRBC BLD-RTO: 0 /100 WBCS (ref 0–0)
OVALOCYTES BLD QL SMEAR: ABNORMAL
PLATELET # BLD AUTO: 191 X10*3/UL (ref 150–450)
PMV BLD AUTO: ABNORMAL FL
POLYCHROMASIA BLD QL SMEAR: ABNORMAL
POTASSIUM SERPL-SCNC: 4.2 MMOL/L (ref 3.5–5.3)
PROT SERPL-MCNC: 7.3 G/DL (ref 6.4–8.2)
PROT SERPL-MCNC: 7.4 G/DL (ref 6.4–8.2)
RBC # BLD AUTO: 3.41 X10*6/UL (ref 4.5–5.9)
RBC MORPH BLD: ABNORMAL
SCHISTOCYTES BLD QL SMEAR: ABNORMAL
SODIUM SERPL-SCNC: 139 MMOL/L (ref 136–145)
TIBC SERPL-MCNC: 422 UG/DL (ref 240–445)
TOTAL CELLS COUNTED BLD: 100
TSH SERPL-ACNC: 1.77 MIU/L (ref 0.44–3.98)
UIBC SERPL-MCNC: 379 UG/DL (ref 110–370)
VIT B12 SERPL-MCNC: 388 PG/ML (ref 211–911)
WBC # BLD AUTO: 3 X10*3/UL (ref 4.4–11.3)

## 2023-10-23 PROCEDURE — 82728 ASSAY OF FERRITIN: CPT | Mod: CMCLAB,PARLAB | Performed by: INTERNAL MEDICINE

## 2023-10-23 PROCEDURE — 36415 COLL VENOUS BLD VENIPUNCTURE: CPT

## 2023-10-23 PROCEDURE — 83615 LACTATE (LD) (LDH) ENZYME: CPT | Performed by: INTERNAL MEDICINE

## 2023-10-23 PROCEDURE — 84443 ASSAY THYROID STIM HORMONE: CPT | Performed by: INTERNAL MEDICINE

## 2023-10-23 PROCEDURE — 82746 ASSAY OF FOLIC ACID SERUM: CPT | Mod: CMCLAB,PARLAB | Performed by: INTERNAL MEDICINE

## 2023-10-23 PROCEDURE — 85027 COMPLETE CBC AUTOMATED: CPT | Performed by: INTERNAL MEDICINE

## 2023-10-23 PROCEDURE — 86334 IMMUNOFIX E-PHORESIS SERUM: CPT | Mod: PARLAB | Performed by: INTERNAL MEDICINE

## 2023-10-23 PROCEDURE — 83036 HEMOGLOBIN GLYCOSYLATED A1C: CPT | Performed by: STUDENT IN AN ORGANIZED HEALTH CARE EDUCATION/TRAINING PROGRAM

## 2023-10-23 PROCEDURE — 83521 IG LIGHT CHAINS FREE EACH: CPT | Mod: PARLAB | Performed by: INTERNAL MEDICINE

## 2023-10-23 PROCEDURE — 80053 COMPREHEN METABOLIC PANEL: CPT | Performed by: INTERNAL MEDICINE

## 2023-10-23 PROCEDURE — 84155 ASSAY OF PROTEIN SERUM: CPT | Mod: CMCLAB,PARLAB | Performed by: INTERNAL MEDICINE

## 2023-10-23 PROCEDURE — 86320 SERUM IMMUNOELECTROPHORESIS: CPT | Performed by: INTERNAL MEDICINE

## 2023-10-23 PROCEDURE — 82607 VITAMIN B-12: CPT | Mod: CMCLAB,PARLAB | Performed by: INTERNAL MEDICINE

## 2023-10-23 PROCEDURE — 85007 BL SMEAR W/DIFF WBC COUNT: CPT | Performed by: INTERNAL MEDICINE

## 2023-10-23 PROCEDURE — 84165 PROTEIN E-PHORESIS SERUM: CPT | Performed by: INTERNAL MEDICINE

## 2023-10-23 PROCEDURE — 83550 IRON BINDING TEST: CPT | Performed by: INTERNAL MEDICINE

## 2023-10-24 LAB
KAPPA LC SERPL-MCNC: 6.56 MG/DL (ref 0.33–1.94)
KAPPA LC/LAMBDA SER: 2.17 {RATIO} (ref 0.26–1.65)
LAMBDA LC SERPL-MCNC: 3.02 MG/DL (ref 0.57–2.63)

## 2023-10-26 LAB
ALBUMIN: 3.6 G/DL (ref 3.4–5)
ALPHA 1 GLOBULIN: 0.4 G/DL (ref 0.2–0.6)
ALPHA 2 GLOBULIN: 0.6 G/DL (ref 0.4–1.1)
BETA GLOBULIN: 1.1 G/DL (ref 0.5–1.2)
GAMMA GLOBULIN: 1.8 G/DL (ref 0.5–1.4)
IMMUNOFIXATION COMMENT: ABNORMAL
PATH REVIEW - SERUM IMMUNOFIXATION: ABNORMAL
PATH REVIEW-SERUM PROTEIN ELECTROPHORESIS: ABNORMAL
PROTEIN ELECTROPHORESIS COMMENT: ABNORMAL

## 2023-10-30 ENCOUNTER — OFFICE VISIT (OUTPATIENT)
Dept: HEMATOLOGY/ONCOLOGY | Facility: CLINIC | Age: 63
End: 2023-10-30
Payer: COMMERCIAL

## 2023-10-30 VITALS
DIASTOLIC BLOOD PRESSURE: 67 MMHG | SYSTOLIC BLOOD PRESSURE: 145 MMHG | RESPIRATION RATE: 18 BRPM | HEIGHT: 68 IN | BODY MASS INDEX: 37.52 KG/M2 | HEART RATE: 70 BPM | TEMPERATURE: 96.1 F | WEIGHT: 247.58 LBS | OXYGEN SATURATION: 92 %

## 2023-10-30 DIAGNOSIS — D69.6 THROMBOCYTOPENIA (CMS-HCC): Primary | ICD-10-CM

## 2023-10-30 DIAGNOSIS — D61.818 PANCYTOPENIA (MULTI): ICD-10-CM

## 2023-10-30 PROCEDURE — 3008F BODY MASS INDEX DOCD: CPT | Performed by: INTERNAL MEDICINE

## 2023-10-30 PROCEDURE — 99214 OFFICE O/P EST MOD 30 MIN: CPT | Performed by: INTERNAL MEDICINE

## 2023-10-30 PROCEDURE — 3044F HG A1C LEVEL LT 7.0%: CPT | Performed by: INTERNAL MEDICINE

## 2023-10-30 PROCEDURE — 4010F ACE/ARB THERAPY RXD/TAKEN: CPT | Performed by: INTERNAL MEDICINE

## 2023-10-30 PROCEDURE — 3077F SYST BP >= 140 MM HG: CPT | Performed by: INTERNAL MEDICINE

## 2023-10-30 PROCEDURE — 3078F DIAST BP <80 MM HG: CPT | Performed by: INTERNAL MEDICINE

## 2023-10-30 PROCEDURE — 1036F TOBACCO NON-USER: CPT | Performed by: INTERNAL MEDICINE

## 2023-10-30 RX ORDER — FERROUS SULFATE 325(65) MG
1 TABLET, DELAYED RELEASE (ENTERIC COATED) ORAL DAILY
Qty: 90 TABLET | Refills: 3 | Status: SHIPPED | OUTPATIENT
Start: 2023-10-30

## 2023-10-30 ASSESSMENT — PAIN SCALES - GENERAL: PAINLEVEL: 0-NO PAIN

## 2023-10-30 NOTE — PROGRESS NOTES
"Treatment Synopsis:    myelodysplastic syndrome - unclassifiableb:   Diagnosis:   Presented to Dr. Ovalle for pogressive, mild thrombocytopenia.  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     Treatment:   None to date         History of Present Illness:      ID Statement:    DIPIKA JOHNSON is a 63 year old Male        Chief Complaint: \"I am here for my MDS\"   Interval History:    The patient was referred to me by Dr. Garrison for further evaluation and management of anemia with myelodysplastic syndrome on 08/04/2021.      The patient is a 61 year old with a past medical history significant for prostate cancer, cerebral aneurysm, CAD,  HTN, HLD, COPD/asthma, fluid retention, diverticulosis, umbilical hernia, arthritis and low back pain. The patient has a history of MDS  and has been diagnosed in the past. He has been monitoring his CBC with his primary care physician, and a recent change in his WBC and slight anemia has prompted a re-referral to hematology - where we completed a repeat bone marrow biopsy that continued  to show low grade disease, with the diagnosis of myelodysplastic syndrome due to history of Damian t(1;7) leading to equivalent loss of 7q.  The patient was being treated at [facility] but has moved to the area and is choosing to be treated here at [place].      At interview on 08/04/2021 the patient narrated his past medical history. The patient is asymptomatic and denies  any history of shortness of breath at rest, nausea, vomiting, hematemesis, hematuria.     The patient had come for a follow up on 5/1/2023 regarding his history of multifactorial anemia 7Q-patient underwent aortic valve replacement in March 2023 and received blood transfusions.  " Initially felt better but is now beginning to feel weak and tired.   The patient reports easy fatigability. She is anxious to review the result of the tests performed.      The patient and his wife had come for follow-up on June 5, 2023 regarding history of multifactorial anemia, myelodysplastic syndrome with 7 q. minus.  The patient underwent aortic valve replacement in March 2023 and received blood transfusions.  Initially  felt better but is now beginning to feel weak and tired requesting assistance.     The patient and his wife had come for follow-up on October 30, 2023 regarding history of multifactorial anemia, myelodysplastic syndrome with 7 q. minus and iron deficiency component.  The patient underwent aortic valve replacement in March 2023 and received  blood transfusion.  Also had iron deficiency component and received intravenous Feraheme as well.  He is beginning to feel better now     Past Medical History:   1. Anemia with MDS  2. Prostate cancer  3. Cerebral aneurysm  4. CAD   5. HTN   6. HLD  7. COPD/asthma  8. Fluid retention   9. Diverticulosis   10. Umbilical hernia   11. Arthritis   12. Low back pain      Past Surgical History:   1. Cerebral aneurysm repair   2. Stent placement   3. Brachytherapy   4.  Aortic valve replacement in March 2023.  The patient received 2 units of packed red blood cell transfusions.  Family Medical History:   1. Reviewed but not relevant.      Last colonoscopy was a few years ago.                              Review of Systems:   ·  System Review All other systems have been reviewed and are negative for complaint.            Allergies and Intolerances:       Intolerances:         ciprofloxacin: Drug, Unknown, Active     Outpatient Medication Profile:  * Patient Currently Takes Medications as of 31-Jul-2023 10:49 documented in Structured Notes         losartan 100 mg oral tablet : Last Dose Taken:  , 1 tab(s) orally once a day         atorvastatin 20 mg oral tablet: Last  Dose Taken:  , 1 tab(s) orally once  a day         oxyCODONE-acetaminophen 7.5 mg-325 mg oral tablet: Last Dose Taken:   , 1 tab(s) orally 2 times a day, As Needed         albuterol 2.5 mg/3 mL (0.083%) inhalation solution: Last Dose Taken:   , 3 milliliter(s) inhaled every 4-6 hours, As Needed         albuterol 90 mcg/inh inhalation aerosol: Last Dose Taken:  , 2 puff(s)  inhaled every 4-6 hours, As Needed         spironolactone 25 mg oral tablet: Last Dose Taken:  , 1 tab(s) orally  once a day         aspirin 81 mg oral delayed release tablet: Last Dose Taken:  , 1 tab(s)  orally once a day         Multiple Vitamins with Minerals oral tablet: Last Dose Taken:  , 1 tab(s)  orally once a day         fluticasone/umeclidinium/vilanterol 100 mcg-62.5 mcg-25 mcg/inh inhalation powder : Last Dose Taken:  , 1 puff(s) inhaled once a day         torsemide 20 mg oral tablet: Last Dose Taken:  , 1 tab(s) orally once  a day         Trelegy Ellipta 200 mcg-62.5 mcg-25 mcg/inh inhalation powder: Last Dose  Taken:  , 1 puff(s) inhaled once a day             Medical History:         Severe calcific aortic valve stenosis: ICD-10: I35.0, Status:  Active         Status post right and left heart catheterization: ICD-10:  Z98.890, Status: Active         Bacteremia: ICD-10: R78.81, Status: Active         MDS (myelodysplastic syndrome): ICD-10: D46.9, Status: Active         History of cerebral aneurysm repair: ICD-10: Z98.890, Status:  Active, Description: coiling for thrombosed basilar tip aneurysm  9/12/17         Personal history of prostate cancer: ICD-10: Z85.46, Status:  Active, Description: 2014         Chronic low back pain: ICD-10: M54.5, Status: Active         Arthritis: ICD-10: M19.90, Status: Active, Description: spine         Umbilical hernia without obstruction or gangrene: ICD-10: K42.9,  Status: Active         History of diverticulosis: ICD-10: Z87.19, Status: Active,  Description: per CT         Myelodysplasia: ICD-10:  Q06.9, Status: Active       Surg History:         S/P TAVR (transcatheter aortic valve replacement): ICD-10:  Z95.2, Status: Active         Dyspnea: ICD-10: R06.00, Status: Active         Coronary artery disease: ICD-10: I25.10, Status: Active         Elevated coronary artery calcium score: ICD-10: R93.1, Status:  Active         History of surgery for cerebral aneurysm: ICD-10: Z98.890,  Status: Active, Description: coiling for thrombosed basilar tip aneurysm  9/12/17         History of brachytherapy: ICD-10: Z92.3, Status: Active, Description:  prostate ~2014     Family History: No Family History items are recorded  in the problem list.      Social History:   Social Substance History:  ·  Social History denies smoking, alcohol and drug use   ·  Smoking Status never smoker (1)   ·  Tobacco Use denies   ·  Alcohol Use denies   ·  Drug Use denies            Vitals and Measurements:   Vitals: Temp: 36.4  HR: 71  RR: 18  BP: 139/63  SPO2%:   91   Measurements: HT(cm): 173.5  WT(kg): 108.8  BSA:  2.28  BMI:  36.1      Physical Exam:      Constitutional: overwt middle aged man, no acute  distress   Eyes: clear sclera, conjunctival normal, pupils equal   ENMT: mucous membranes moist, no apparent injury,  no lesions seen   Head/Neck: Neck supple, no apparent injury, thyroid  without mass or tenderness, No JVD, trachea midline, no bruits   Respiratory/Thorax: good chest expansion, hollow  to percussion, normal tactile fremitous   Cardiovascular: Regular, rate and rhythm, no murmurs,  +2 radial and dorsalis pedis pulses.  He has trace edema bilaterally   Gastrointestinal: Nondistended, soft, non-tender,  no rebound tenderness or guarding.  No hepatosplenomegaly on careful exam, but this exam is limited by body habitus   Genitourinary: No Discharge, vesicles or other abnormalities   Musculoskeletal: ROM intact, no joint swelling, normal  strength   Extremities: normal extremities, see cardiat   Neurological: alert and oriented  x3, intact senses,  motor, response and reflexes, normal strength   Lymphatic: No significant cervical, supraclavicular,  axillary lymphadenopathy   Psychological: Appropriate mood and behavior   Skin: Warm and dry, no lesions, no rashes         Lab Results:     ·  Results        CBC date/time       WBC     HGB     HCT     PLT     Neut      28-Jul-2023 10:25   3.3(L)  11.3(L) 36.7(L) 118(L)  N/A     BMP date/time       NA              K               CL              CO2             BUN             CREAT             28-Jul-2023 10:25   141             4.3             110(H)          N/A             19              0.91     Hepatic date/time   T Pro   T Bili  AST     ALT     ALKP    ALB       16-May-2016 10:47   7.1     0.5     23      34      80      4.0     LDH date/time       LDH     30-Mar-2023 05:39   N/A     Assessment and Plan:      Assessment and Plan:   Assessment:    1. Anemia with MDS     The patient was referred to me by Dr. Garrison for further evaluation and management of anemia with myelodysplastic syndrome on 08/04/2021.      The patient is a 61 year old with a past medical history significant for prostate cancer, cerebral aneurysm, CAD,  HTN, HLD, COPD/asthma, fluid retention, diverticulosis, umbilical hernia, arthritis and low back pain. The patient has a history of MDS  and has been diagnosed in the past. He has been monitoring his CBC with his primary care physician, and a recent change in his WBC and slight anemia has prompted a re-referral to hematology - where we completed a repeat bone marrow biopsy that continued  to show low grade disease, with the diagnosis of myelodysplastic syndrome due to history of Damian t(1;7) leading to equivalent loss of 7q.  The patient was being treated at [facility] but has moved to the area and is choosing to be treated here at [place].  Physical exam was within normal limits. I reviewed the lab data with the patient. CBC obtained on 07/23/2021 revealed WBC 3.5, HGB  9.3, HCT 32.5, , Neut 1.15. I had a detailed discussion with the patient and explained to him the pathophysiology  of myelodysplastic syndrome. Given that he has had this syndrome for a few years, the patient is well versed on his condition and understanding of the consequences and treatment for MDS. I feel that it would be prudent to rule out any other cause of the  anemia such as hemolysis vs others. I recommended baseline studies and  US of the abdomen. Return in 1 week.      The patient had come for a follow up on 03/29/2022 regarding his history of multifactorial anemia M7Q-. Physical exam was within normal limits. I reviewed th lab data with the patient. CBC obtained on 03/25/2022 revealed WBC 3.7, HGB 13.1, HCT 39.8, PLT  123, Neut 1.66.   Iron saturation at 7% and ferritin at 17 ng/ml suggesting ELAINA component. Oral iron has been held at this time. Reassess in 3 months.      The patient and his wife had come for follow-up of anemia on May 1, 2023, regarding history of myelodysplastic syndrome.  She underwent aortic valve replacement and received 2 units of packed red blood cell transfusion in March 2023.  Initially did very  well but is now beginning to feel weak and tired.  I have recommended Ferrex 150 mg p.o. half an hour after food daily.  I explained to the patient and family that even after iron replacement therapy if he continues anemic would recommend initiating Procrit  the patient and family are agreeable will now return in 4 weeks.     The patient had come for follow-up on June 5, 2023 regarding history of multifactorial anemia including myelodysplastic syndrome 7 q. minus.  The patient underwent aortic valve replacement in March 2023 did receive 2 units of packed red blood cell transfusions.   Initially felt better but is now beginning to feel weak and tired.  Further evaluation revealed hemoglobin down to 9.6 g/dL and iron saturation down to 6%.  Most likely source of iron deficiency is  blood loss during surgery.  I have recommended intravenous  Feraheme 510 mg/week x 2 weeks effect side effects explained the patient understood appreciated all the details provided and was grateful.  Return in 8 weeks.  Will consider Procrit if anemia persists.      The patient and his wife had come for follow-up on October 30, 2023 regarding history of multifactorial anemia including iron deficiency component myelodysplastic syndrome 7 q. minus.  The patient underwent aortic valve replacement in March 2023 subsequently  hemoglobin decreased and iron saturation was down to 9%.  The patient was given a benefit of doubt and received intravenous Feraheme.  Feels better but is not back to baseline.  I have recommended reevaluation of anemia in 3 months and reassess.  Keeping  in mind that the patient has underlying myelodysplastic syndrome.  CBC on October 30, 2023 revealed hemoglobin 11.7 g/dL.  Iron saturation is 10%.  I have given him a benefit of doubt and recommended ferrous sulfate 325 mg p.o. half an hour after food daily.  The patient declined 3 times a day.  Prescription sent.  Return in 3 months.  The patient and his wife understood appreciated all the  details provided and were grateful.     2. Prostate cancer  - Followed clinically by PCP, oncology and urology services   - Torsemide 10 mg PO QD  - Lasix 20 mg PO QD      3. Cerebral aneurysm  - S/p cerebral aneurysm repair   - Followed clinically by cardiovascular and neurology services.   -Aspirin 81 mg PO QD      4. CAD   - Followed clinically by PCP, cardiology and cardiovascular services.  Aspirin 81 mg PO QD      5. HTN    - Followed clinically by PCP, cardiology.   - Losartan 100 mg PO QD      6. HLD  - Atorvastatin 20 mg PO QD     7. COPD/asthma  - Ventolin HFA 90 mcg/inh 2 puff inhaled 4 times day     8. Fluid retention   - Lasix 20 mg PO QD   - Torsemide 10 mg PO QD      9. Diverticulosis   - Followed clinically by PCP and GI services.      10.  Umbilical hernia  - Followed clinically by PCP and GI services.      11. Arthritis      12. Low back pain   -Oxycodone- acetaminophen 7.5-325 mg PO BID

## 2023-11-17 ENCOUNTER — OFFICE VISIT (OUTPATIENT)
Dept: PRIMARY CARE | Facility: CLINIC | Age: 63
End: 2023-11-17
Payer: COMMERCIAL

## 2023-11-17 VITALS
DIASTOLIC BLOOD PRESSURE: 82 MMHG | BODY MASS INDEX: 37.89 KG/M2 | OXYGEN SATURATION: 97 % | SYSTOLIC BLOOD PRESSURE: 142 MMHG | HEART RATE: 83 BPM | WEIGHT: 250 LBS | HEIGHT: 68 IN

## 2023-11-17 DIAGNOSIS — D46.9 MYELODYSPLASTIC SYNDROME, UNSPECIFIED (MULTI): ICD-10-CM

## 2023-11-17 DIAGNOSIS — I67.1 BRAIN ANEURYSM (HHS-HCC): ICD-10-CM

## 2023-11-17 DIAGNOSIS — J43.2 CENTRILOBULAR EMPHYSEMA (MULTI): ICD-10-CM

## 2023-11-17 DIAGNOSIS — I11.0 HYPERTENSIVE HEART DISEASE WITH HEART FAILURE (MULTI): ICD-10-CM

## 2023-11-17 DIAGNOSIS — R35.0 URINARY FREQUENCY: ICD-10-CM

## 2023-11-17 DIAGNOSIS — Z23 ENCOUNTER FOR IMMUNIZATION: ICD-10-CM

## 2023-11-17 DIAGNOSIS — G89.4 CHRONIC PAIN SYNDROME: ICD-10-CM

## 2023-11-17 DIAGNOSIS — G89.29 CHRONIC BILATERAL LOW BACK PAIN WITHOUT SCIATICA: Primary | Chronic | ICD-10-CM

## 2023-11-17 DIAGNOSIS — R56.9 SEIZURES (MULTI): ICD-10-CM

## 2023-11-17 DIAGNOSIS — E11.9 TYPE 2 DIABETES MELLITUS WITHOUT COMPLICATION, WITHOUT LONG-TERM CURRENT USE OF INSULIN (MULTI): ICD-10-CM

## 2023-11-17 DIAGNOSIS — R39.15 URGENCY OF URINATION: ICD-10-CM

## 2023-11-17 DIAGNOSIS — M54.50 CHRONIC BILATERAL LOW BACK PAIN WITHOUT SCIATICA: Primary | Chronic | ICD-10-CM

## 2023-11-17 DIAGNOSIS — C61 ADENOCARCINOMA OF PROSTATE (MULTI): ICD-10-CM

## 2023-11-17 PROCEDURE — 90686 IIV4 VACC NO PRSV 0.5 ML IM: CPT | Performed by: STUDENT IN AN ORGANIZED HEALTH CARE EDUCATION/TRAINING PROGRAM

## 2023-11-17 PROCEDURE — 4010F ACE/ARB THERAPY RXD/TAKEN: CPT | Performed by: STUDENT IN AN ORGANIZED HEALTH CARE EDUCATION/TRAINING PROGRAM

## 2023-11-17 PROCEDURE — 3077F SYST BP >= 140 MM HG: CPT | Performed by: STUDENT IN AN ORGANIZED HEALTH CARE EDUCATION/TRAINING PROGRAM

## 2023-11-17 PROCEDURE — 3008F BODY MASS INDEX DOCD: CPT | Performed by: STUDENT IN AN ORGANIZED HEALTH CARE EDUCATION/TRAINING PROGRAM

## 2023-11-17 PROCEDURE — 1036F TOBACCO NON-USER: CPT | Performed by: STUDENT IN AN ORGANIZED HEALTH CARE EDUCATION/TRAINING PROGRAM

## 2023-11-17 PROCEDURE — 99213 OFFICE O/P EST LOW 20 MIN: CPT | Performed by: STUDENT IN AN ORGANIZED HEALTH CARE EDUCATION/TRAINING PROGRAM

## 2023-11-17 PROCEDURE — 3044F HG A1C LEVEL LT 7.0%: CPT | Performed by: STUDENT IN AN ORGANIZED HEALTH CARE EDUCATION/TRAINING PROGRAM

## 2023-11-17 PROCEDURE — 3079F DIAST BP 80-89 MM HG: CPT | Performed by: STUDENT IN AN ORGANIZED HEALTH CARE EDUCATION/TRAINING PROGRAM

## 2023-11-17 PROCEDURE — 90471 IMMUNIZATION ADMIN: CPT | Performed by: STUDENT IN AN ORGANIZED HEALTH CARE EDUCATION/TRAINING PROGRAM

## 2023-11-17 RX ORDER — HYDROCODONE BITARTRATE AND ACETAMINOPHEN 5; 325 MG/1; MG/1
2 TABLET ORAL 3 TIMES DAILY PRN
Qty: 180 TABLET | Refills: 0 | Status: SHIPPED | OUTPATIENT
Start: 2023-11-17 | End: 2023-12-21 | Stop reason: SDUPTHER

## 2023-11-17 RX ORDER — OXYBUTYNIN CHLORIDE 5 MG/1
5 TABLET ORAL 2 TIMES DAILY
Qty: 60 TABLET | Refills: 1 | Status: SHIPPED | OUTPATIENT
Start: 2023-11-17 | End: 2024-02-23 | Stop reason: ALTCHOICE

## 2023-11-17 ASSESSMENT — ENCOUNTER SYMPTOMS
NEUROLOGICAL NEGATIVE: 1
GASTROINTESTINAL NEGATIVE: 1
RESPIRATORY NEGATIVE: 1
BRUISES/BLEEDS EASILY: 1
CARDIOVASCULAR NEGATIVE: 1
EYES NEGATIVE: 1
BACK PAIN: 1

## 2023-11-17 ASSESSMENT — PATIENT HEALTH QUESTIONNAIRE - PHQ9
SUM OF ALL RESPONSES TO PHQ9 QUESTIONS 1 AND 2: 0
1. LITTLE INTEREST OR PLEASURE IN DOING THINGS: NOT AT ALL
2. FEELING DOWN, DEPRESSED OR HOPELESS: NOT AT ALL

## 2023-11-17 NOTE — PROGRESS NOTES
"Subjective   Patient ID: Crow Gaviria is a 63 y.o. male who presents for Follow-up (3 month /Flu shot).    Gets up every 1-2 hours to urinate. Wants to know if there is anything he could try for this.        He last had his pain medication increased and states it helps.  He had some issues initially getting it filled but it was fixed. He initially had some constipation but that has resolved.      He has no other complaints at this time.     Would like his flu shot at this time.     Review of Systems   HENT: Negative.     Eyes: Negative.    Respiratory: Negative.     Cardiovascular: Negative.    Gastrointestinal: Negative.    Genitourinary: Negative.    Musculoskeletal:  Positive for back pain (chronic).   Skin: Negative.    Allergic/Immunologic: Positive for environmental allergies.   Neurological: Negative.    Hematological:  Bruises/bleeds easily.       Objective   /82 (BP Location: Left arm, Patient Position: Sitting, BP Cuff Size: Large adult)   Pulse 83   Ht 1.716 m (5' 7.56\")   Wt 113 kg (250 lb)   SpO2 97%   BMI 38.51 kg/m²     Physical Exam  Constitutional:       Appearance: Normal appearance.   Cardiovascular:      Rate and Rhythm: Normal rate and regular rhythm.   Pulmonary:      Effort: Pulmonary effort is normal.      Breath sounds: Normal breath sounds.   Abdominal:      General: Abdomen is flat.      Palpations: Abdomen is soft.   Skin:     General: Skin is warm and dry.   Neurological:      General: No focal deficit present.      Mental Status: He is alert.   Psychiatric:         Mood and Affect: Mood normal.         Behavior: Behavior normal.         Thought Content: Thought content normal.         Judgment: Judgment normal.     Body mass index is 38.51 kg/m².      Current Outpatient Medications:     albuterol 2.5 mg /3 mL (0.083 %) nebulizer solution, Take by nebulization if needed for wheezing or shortness of breath. INHALE CONTENTS OF 1 VIAL VIA NEBULIZER EVERY 4 TO 6 HOURS, Disp: , " Rfl:     albuterol 90 mcg/actuation inhaler, Inhale 2 puffs if needed. EVERY 4 TO 6 HOURS, Disp: , Rfl:     aspirin 81 mg EC tablet, Take 1 tablet (81 mg) by mouth once daily., Disp: , Rfl:     atorvastatin (Lipitor) 20 mg tablet, Take 1 tablet (20 mg) by mouth once daily., Disp: , Rfl:     DULoxetine (Cymbalta) 20 mg DR capsule, Take 1 capsule (20 mg) by mouth once daily., Disp: , Rfl:     ferrous sulfate 325 (65 Fe) MG EC tablet, Take 1 tablet (325 mg) by mouth once daily. Ferrous sulfate 325 mg tablet, 1 tablet half an hour after food daily., Disp: 90 tablet, Rfl: 3    fluticasone (Flonase) 50 mcg/actuation nasal spray, Administer 2 sprays into each nostril once daily., Disp: 16 g, Rfl: 1    ibuprofen (IBU) 600 mg tablet, Take 1 tablet (600 mg) by mouth every 6 hours if needed., Disp: , Rfl:     losartan (Cozaar) 100 mg tablet, Take 1 tablet (100 mg) by mouth once daily., Disp: , Rfl:     multivitamin capsule, Take 1 capsule by mouth once daily., Disp: , Rfl:     multivitamin with minerals (multivit-min-iron fum-folic ac) tablet, Take 1 tablet by mouth once daily., Disp: , Rfl:     naloxone (Narcan) 4 mg/0.1 mL nasal spray, Administer 1 mg into affected nostril(s) 1 time. may repeat in 1 to 2 hours if symptoms recur, Disp: , Rfl:     OXcarbazepine (Trileptal) 150 mg tablet, Take 3 tablets (450 mg) by mouth 2 times a day., Disp: , Rfl:     spironolactone (Aldactone) 25 mg tablet, Take 1 tablet (25 mg) by mouth once daily., Disp: 90 tablet, Rfl: 3    tiotropium (Spiriva) 18 mcg inhalation capsule, Place 1 capsule (18 mcg) into inhaler and inhale once daily., Disp: , Rfl:     torsemide (Demadex) 10 mg tablet, Take by mouth., Disp: , Rfl:     torsemide (Demadex) 20 mg tablet, Take 1 tablet (20 mg) by mouth once daily., Disp: , Rfl:     Trelegy Ellipta 100-62.5-25 mcg blister with device, Inhale., Disp: , Rfl:     umeclidinium (Incruse Ellipta) 62.5 mcg/actuation inhalation, Inhale 1 puff (62.5 mcg) once daily., Disp:  , Rfl:     HYDROcodone-acetaminophen (Norco) 5-325 mg tablet, Take 2 tablets by mouth 3 times a day as needed for severe pain (7 - 10)., Disp: 180 tablet, Rfl: 0    oxybutynin (Ditropan) 5 mg tablet, Take 1 tablet (5 mg) by mouth 2 times a day., Disp: 60 tablet, Rfl: 1      Assessment/Plan   Diagnoses and all orders for this visit:  Chronic bilateral low back pain without sciatica  Comments:  stable on current regimen 10mg TID   CSA and UDS UTD  Orders:  -     HYDROcodone-acetaminophen (Norco) 5-325 mg tablet; Take 2 tablets by mouth 3 times a day as needed for severe pain (7 - 10).  Chronic pain syndrome  -     HYDROcodone-acetaminophen (Norco) 5-325 mg tablet; Take 2 tablets by mouth 3 times a day as needed for severe pain (7 - 10).  Encounter for immunization  -     Flu vaccine (IIV4) age 6 months and greater, preservative free  Urgency of urination  -     oxybutynin (Ditropan) 5 mg tablet; Take 1 tablet (5 mg) by mouth 2 times a day.  Urinary frequency  -     oxybutynin (Ditropan) 5 mg tablet; Take 1 tablet (5 mg) by mouth 2 times a day.  Brain aneurysm  Hypertensive heart disease with heart failure (CMS/HCC)  Myelodysplastic syndrome, unspecified (CMS/HCC)  Comments:  follows with hematology  Adenocarcinoma of prostate (CMS/HCC)  Seizures (CMS/HCC)  Centrilobular emphysema (CMS/HCC)  Comments:  symptoms stable  Type 2 diabetes mellitus without complication, without long-term current use of insulin (CMS/HCC)  Comments:  stable    I saw and evaluated the patient. I personally obtained the key and critical portions of the history and physical exam or was physically present for key and critical portions performed by the trainee. I reviewed the trainee's documentation and discussed the patient with the trainee. I agree with the trainee's medical decision making, as documented on the trainee's note.        [facility] report reviewed for Jose Gaviria today and is consistent with prescribed therapy.  We weighed the risks  and benefit of this therapy and will continue with the current plan    Follow up in 3 months    Li Cloud, DO

## 2023-12-21 DIAGNOSIS — G89.29 CHRONIC BILATERAL LOW BACK PAIN WITHOUT SCIATICA: Chronic | ICD-10-CM

## 2023-12-21 DIAGNOSIS — G89.4 CHRONIC PAIN SYNDROME: ICD-10-CM

## 2023-12-21 DIAGNOSIS — M54.50 CHRONIC BILATERAL LOW BACK PAIN WITHOUT SCIATICA: Chronic | ICD-10-CM

## 2023-12-21 RX ORDER — HYDROCODONE BITARTRATE AND ACETAMINOPHEN 5; 325 MG/1; MG/1
2 TABLET ORAL 3 TIMES DAILY PRN
Qty: 180 TABLET | Refills: 0 | Status: SHIPPED | OUTPATIENT
Start: 2023-12-21 | End: 2024-01-24 | Stop reason: SDUPTHER

## 2024-01-23 ENCOUNTER — LAB (OUTPATIENT)
Dept: LAB | Facility: CLINIC | Age: 64
End: 2024-01-23
Payer: COMMERCIAL

## 2024-01-23 DIAGNOSIS — D69.6 THROMBOCYTOPENIA (CMS-HCC): ICD-10-CM

## 2024-01-23 DIAGNOSIS — D61.818 PANCYTOPENIA (MULTI): ICD-10-CM

## 2024-01-23 LAB
ALBUMIN SERPL BCP-MCNC: 3.8 G/DL (ref 3.4–5)
ALP SERPL-CCNC: 74 U/L (ref 33–136)
ALT SERPL W P-5'-P-CCNC: 21 U/L (ref 10–52)
ANION GAP SERPL CALC-SCNC: 13 MMOL/L (ref 10–20)
AST SERPL W P-5'-P-CCNC: 22 U/L (ref 9–39)
BASOPHILS # BLD AUTO: 0.02 X10*3/UL (ref 0–0.1)
BASOPHILS NFR BLD AUTO: 0.7 %
BILIRUB SERPL-MCNC: 0.6 MG/DL (ref 0–1.2)
BUN SERPL-MCNC: 18 MG/DL (ref 6–23)
CALCIUM SERPL-MCNC: 8.6 MG/DL (ref 8.6–10.3)
CHLORIDE SERPL-SCNC: 108 MMOL/L (ref 98–107)
CO2 SERPL-SCNC: 24 MMOL/L (ref 21–32)
CREAT SERPL-MCNC: 0.98 MG/DL (ref 0.5–1.3)
DACRYOCYTES BLD QL SMEAR: NORMAL
EGFRCR SERPLBLD CKD-EPI 2021: 86 ML/MIN/1.73M*2
EOSINOPHIL # BLD AUTO: 0.13 X10*3/UL (ref 0–0.7)
EOSINOPHIL NFR BLD AUTO: 4.4 %
ERYTHROCYTE [DISTWIDTH] IN BLOOD BY AUTOMATED COUNT: 19.4 % (ref 11.5–14.5)
FERRITIN SERPL-MCNC: 107 NG/ML (ref 20–300)
GIANT PLATELETS BLD QL SMEAR: NORMAL
GLUCOSE SERPL-MCNC: 96 MG/DL (ref 74–99)
HCT VFR BLD AUTO: 36.8 % (ref 41–52)
HGB BLD-MCNC: 11.9 G/DL (ref 13.5–17.5)
IMM GRANULOCYTES # BLD AUTO: 0.09 X10*3/UL (ref 0–0.7)
IMM GRANULOCYTES NFR BLD AUTO: 3.1 % (ref 0–0.9)
IRON SATN MFR SERPL: 26 % (ref 25–45)
IRON SERPL-MCNC: 101 UG/DL (ref 35–150)
LYMPHOCYTES # BLD AUTO: 1.01 X10*3/UL (ref 1.2–4.8)
LYMPHOCYTES NFR BLD AUTO: 34.2 %
MCH RBC QN AUTO: 37.3 PG (ref 26–34)
MCHC RBC AUTO-ENTMCNC: 32.3 G/DL (ref 32–36)
MCV RBC AUTO: 115 FL (ref 80–100)
MONOCYTES # BLD AUTO: 0.37 X10*3/UL (ref 0.1–1)
MONOCYTES NFR BLD AUTO: 12.5 %
NEUTROPHILS # BLD AUTO: 1.33 X10*3/UL (ref 1.2–7.7)
NEUTROPHILS NFR BLD AUTO: 45.1 %
NRBC BLD-RTO: 0 /100 WBCS (ref 0–0)
OVALOCYTES BLD QL SMEAR: NORMAL
PLATELET # BLD AUTO: 159 X10*3/UL (ref 150–450)
POLYCHROMASIA BLD QL SMEAR: NORMAL
POTASSIUM SERPL-SCNC: 4.3 MMOL/L (ref 3.5–5.3)
PROT SERPL-MCNC: 6.9 G/DL (ref 6.4–8.2)
RBC # BLD AUTO: 3.19 X10*6/UL (ref 4.5–5.9)
RBC MORPH BLD: NORMAL
SODIUM SERPL-SCNC: 141 MMOL/L (ref 136–145)
TIBC SERPL-MCNC: 385 UG/DL (ref 240–445)
UIBC SERPL-MCNC: 284 UG/DL (ref 110–370)
WBC # BLD AUTO: 3 X10*3/UL (ref 4.4–11.3)

## 2024-01-23 PROCEDURE — 85025 COMPLETE CBC W/AUTO DIFF WBC: CPT | Performed by: INTERNAL MEDICINE

## 2024-01-23 PROCEDURE — 83540 ASSAY OF IRON: CPT | Performed by: INTERNAL MEDICINE

## 2024-01-23 PROCEDURE — 80053 COMPREHEN METABOLIC PANEL: CPT | Performed by: INTERNAL MEDICINE

## 2024-01-23 PROCEDURE — 82728 ASSAY OF FERRITIN: CPT | Mod: PARLAB | Performed by: INTERNAL MEDICINE

## 2024-01-23 PROCEDURE — 36415 COLL VENOUS BLD VENIPUNCTURE: CPT

## 2024-01-24 DIAGNOSIS — M54.50 CHRONIC BILATERAL LOW BACK PAIN WITHOUT SCIATICA: Chronic | ICD-10-CM

## 2024-01-24 DIAGNOSIS — G89.29 CHRONIC BILATERAL LOW BACK PAIN WITHOUT SCIATICA: Chronic | ICD-10-CM

## 2024-01-24 DIAGNOSIS — G89.4 CHRONIC PAIN SYNDROME: ICD-10-CM

## 2024-01-24 RX ORDER — HYDROCODONE BITARTRATE AND ACETAMINOPHEN 5; 325 MG/1; MG/1
2 TABLET ORAL 3 TIMES DAILY PRN
Qty: 180 TABLET | Refills: 0 | Status: SHIPPED | OUTPATIENT
Start: 2024-01-24 | End: 2024-02-23 | Stop reason: SDUPTHER

## 2024-01-29 ENCOUNTER — OFFICE VISIT (OUTPATIENT)
Dept: HEMATOLOGY/ONCOLOGY | Facility: CLINIC | Age: 64
End: 2024-01-29
Payer: MEDICARE

## 2024-01-29 VITALS
SYSTOLIC BLOOD PRESSURE: 154 MMHG | HEART RATE: 68 BPM | OXYGEN SATURATION: 92 % | TEMPERATURE: 96.4 F | HEIGHT: 68 IN | BODY MASS INDEX: 38.39 KG/M2 | WEIGHT: 253.31 LBS | DIASTOLIC BLOOD PRESSURE: 76 MMHG | RESPIRATION RATE: 18 BRPM

## 2024-01-29 DIAGNOSIS — D61.818 PANCYTOPENIA (MULTI): ICD-10-CM

## 2024-01-29 DIAGNOSIS — D69.6 THROMBOCYTOPENIA (CMS-HCC): ICD-10-CM

## 2024-01-29 PROCEDURE — 99214 OFFICE O/P EST MOD 30 MIN: CPT | Performed by: INTERNAL MEDICINE

## 2024-01-29 PROCEDURE — 3008F BODY MASS INDEX DOCD: CPT | Performed by: INTERNAL MEDICINE

## 2024-01-29 PROCEDURE — 3078F DIAST BP <80 MM HG: CPT | Performed by: INTERNAL MEDICINE

## 2024-01-29 PROCEDURE — 3077F SYST BP >= 140 MM HG: CPT | Performed by: INTERNAL MEDICINE

## 2024-01-29 PROCEDURE — 1036F TOBACCO NON-USER: CPT | Performed by: INTERNAL MEDICINE

## 2024-01-29 PROCEDURE — 4010F ACE/ARB THERAPY RXD/TAKEN: CPT | Performed by: INTERNAL MEDICINE

## 2024-01-29 ASSESSMENT — PAIN SCALES - GENERAL: PAINLEVEL: 0-NO PAIN

## 2024-01-29 NOTE — PROGRESS NOTES
"Treatment Synopsis:    myelodysplastic syndrome - unclassifiableb:   Diagnosis:   Presented to Dr. Ovalle for pogressive, mild thrombocytopenia.  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     Treatment:   None to date         History of Present Illness:      ID Statement:    DIPIKA JOHNSON is a 63 year old Male        Chief Complaint: \"I am here for my MDS\"   Interval History:    The patient was referred to me by Dr. Garrison for further evaluation and management of anemia with myelodysplastic syndrome on 08/04/2021.      The patient is a 61 year old with a past medical history significant for prostate cancer, cerebral aneurysm, CAD,  HTN, HLD, COPD/asthma, fluid retention, diverticulosis, umbilical hernia, arthritis and low back pain. The patient has a history of MDS  and has been diagnosed in the past. He has been monitoring his CBC with his primary care physician, and a recent change in his WBC and slight anemia has prompted a re-referral to hematology - where we completed a repeat bone marrow biopsy that continued  to show low grade disease, with the diagnosis of myelodysplastic syndrome due to history of Damian t(1;7) leading to equivalent loss of 7q.  The patient was being treated at [facility] but has moved to the area and is choosing to be treated here at [place] [facility].      At interview on 08/04/2021 the patient narrated his past medical history. The patient is asymptomatic and denies  any history of shortness of breath at rest, nausea, vomiting, hematemesis, hematuria.     The patient had come for a follow up on 5/1/2023 regarding his history of multifactorial anemia 7Q-patient underwent aortic valve replacement in March 2023 and received blood transfusions.  " Initially felt better but is now beginning to feel weak and tired.   The patient reports easy fatigability. She is anxious to review the result of the tests performed.      The patient and his wife had come for follow-up on June 5, 2023 regarding history of multifactorial anemia, myelodysplastic syndrome with 7 q. minus.  The patient underwent aortic valve replacement in March 2023 and received blood transfusions.  Initially  felt better but is now beginning to feel weak and tired requesting assistance.     The patient and his wife had come for follow-up on January 29, 2024 regarding history of multifactorial anemia, myelodysplastic syndrome with 7 q. - and iron deficiency component.  The patient underwent aortic valve replacement in March 2023 and received  blood transfusion.  Also had iron deficiency component and received intravenous Feraheme as well.  He is beginning to feel better now     Past Medical History:   1. Anemia with MDS  2. Prostate cancer  3. Cerebral aneurysm  4. CAD   5. HTN   6. HLD  7. COPD/asthma  8. Fluid retention   9. Diverticulosis   10. Umbilical hernia   11. Arthritis   12. Low back pain      Past Surgical History:   1. Cerebral aneurysm repair   2. Stent placement   3. Brachytherapy   4.  Aortic valve replacement in March 2023.  The patient received 2 units of packed red blood cell transfusions.  Family Medical History:   1. Reviewed but not relevant.      Last colonoscopy was a few years ago.                              Review of Systems:   ·  System Review All other systems have been reviewed and are negative for complaint.            Allergies and Intolerances:       Intolerances:         ciprofloxacin: Drug, Unknown, Active     Outpatient Medication Profile:  * Patient Currently Takes Medications as of 31-Jul-2023 10:49 documented in Structured Notes         losartan 100 mg oral tablet : Last Dose Taken:  , 1 tab(s) orally once a day         atorvastatin 20 mg oral tablet: Last Dose  Taken:  , 1 tab(s) orally once  a day         oxyCODONE-acetaminophen 7.5 mg-325 mg oral tablet: Last Dose Taken:   , 1 tab(s) orally 2 times a day, As Needed         albuterol 2.5 mg/3 mL (0.083%) inhalation solution: Last Dose Taken:   , 3 milliliter(s) inhaled every 4-6 hours, As Needed         albuterol 90 mcg/inh inhalation aerosol: Last Dose Taken:  , 2 puff(s)  inhaled every 4-6 hours, As Needed         spironolactone 25 mg oral tablet: Last Dose Taken:  , 1 tab(s) orally  once a day         aspirin 81 mg oral delayed release tablet: Last Dose Taken:  , 1 tab(s)  orally once a day         Multiple Vitamins with Minerals oral tablet: Last Dose Taken:  , 1 tab(s)  orally once a day         fluticasone/umeclidinium/vilanterol 100 mcg-62.5 mcg-25 mcg/inh inhalation powder : Last Dose Taken:  , 1 puff(s) inhaled once a day         torsemide 20 mg oral tablet: Last Dose Taken:  , 1 tab(s) orally once  a day         Trelegy Ellipta 200 mcg-62.5 mcg-25 mcg/inh inhalation powder: Last Dose  Taken:  , 1 puff(s) inhaled once a day             Medical History:         Severe calcific aortic valve stenosis: ICD-10: I35.0, Status:  Active         Status post right and left heart catheterization: ICD-10:  Z98.890, Status: Active         Bacteremia: ICD-10: R78.81, Status: Active         MDS (myelodysplastic syndrome): ICD-10: D46.9, Status: Active         History of cerebral aneurysm repair: ICD-10: Z98.890, Status:  Active, Description: coiling for thrombosed basilar tip aneurysm  9/12/17         Personal history of prostate cancer: ICD-10: Z85.46, Status:  Active, Description: 2014         Chronic low back pain: ICD-10: M54.5, Status: Active         Arthritis: ICD-10: M19.90, Status: Active, Description: spine         Umbilical hernia without obstruction or gangrene: ICD-10: K42.9,  Status: Active         History of diverticulosis: ICD-10: Z87.19, Status: Active,  Description: per CT         Myelodysplasia: ICD-10: Q06.9,  Status: Active       Surg History:         S/P TAVR (transcatheter aortic valve replacement): ICD-10:  Z95.2, Status: Active         Dyspnea: ICD-10: R06.00, Status: Active         Coronary artery disease: ICD-10: I25.10, Status: Active         Elevated coronary artery calcium score: ICD-10: R93.1, Status:  Active         History of surgery for cerebral aneurysm: ICD-10: Z98.890,  Status: Active, Description: coiling for thrombosed basilar tip aneurysm  9/12/17         History of brachytherapy: ICD-10: Z92.3, Status: Active, Description:  prostate ~2014     Family History: No Family History items are recorded  in the problem list.      Social History:   Social Substance History:  ·  Social History denies smoking, alcohol and drug use   ·  Smoking Status never smoker (1)   ·  Tobacco Use denies   ·  Alcohol Use denies   ·  Drug Use denies            Vitals and Measurements:   Vitals: Temp: 36.4  HR: 71  RR: 18  BP: 139/63  SPO2%:   91   Measurements: HT(cm): 173.5  WT(kg): 108.8  BSA:  2.28  BMI:  36.1      Physical Exam:      Constitutional: overwt middle aged man, no acute  distress   Eyes: clear sclera, conjunctival normal, pupils equal   ENMT: mucous membranes moist, no apparent injury,  no lesions seen   Head/Neck: Neck supple, no apparent injury, thyroid  without mass or tenderness, No JVD, trachea midline, no bruits   Respiratory/Thorax: good chest expansion, hollow  to percussion, normal tactile fremitous   Cardiovascular: Regular, rate and rhythm, no murmurs,  +2 radial and dorsalis pedis pulses.  He has trace edema bilaterally   Gastrointestinal: Nondistended, soft, non-tender,  no rebound tenderness or guarding.  No hepatosplenomegaly on careful exam, but this exam is limited by body habitus   Genitourinary: No Discharge, vesicles or other abnormalities   Musculoskeletal: ROM intact, no joint swelling, normal  strength   Extremities: normal extremities, see cardiat   Neurological: alert and oriented x3,  intact senses,  motor, response and reflexes, normal strength   Lymphatic: No significant cervical, supraclavicular,  axillary lymphadenopathy   Psychological: Appropriate mood and behavior   Skin: Warm and dry, no lesions, no rashes         Lab Results:           Assessment and Plan:       1. Anemia with MDS     The patient was referred to me by Dr. Garrison for further evaluation and management of anemia with myelodysplastic syndrome on 08/04/2021.      The patient is a 61 year old with a past medical history significant for prostate cancer, cerebral aneurysm, CAD,  HTN, HLD, COPD/asthma, fluid retention, diverticulosis, umbilical hernia, arthritis and low back pain. The patient has a history of MDS  and has been diagnosed in the past. He has been monitoring his CBC with his primary care physician, and a recent change in his WBC and slight anemia has prompted a re-referral to hematology - where we completed a repeat bone marrow biopsy that continued  to show low grade disease, with the diagnosis of myelodysplastic syndrome due to history of Damian t(1;7) leading to equivalent loss of 7q.  The patient was being treated at [facility] but has moved to the area and is choosing to be treated here at [place].  Physical exam was within normal limits. I reviewed the lab data with the patient. CBC obtained on 07/23/2021 revealed WBC 3.5, HGB 9.3, HCT 32.5, , Neut 1.15. I had a detailed discussion with the patient and explained to him the pathophysiology  of myelodysplastic syndrome. Given that he has had this syndrome for a few years, the patient is well versed on his condition and understanding of the consequences and treatment for MDS. I feel that it would be prudent to rule out any other cause of the  anemia such as hemolysis vs others. I recommended baseline studies and  US of the abdomen. Return in 1 week.      The patient had come for a follow up on 03/29/2022 regarding his history of multifactorial anemia M7Q-.  Physical exam was within normal limits. I reviewed th lab data with the patient. CBC obtained on 03/25/2022 revealed WBC 3.7, HGB 13.1, HCT 39.8, PLT  123, Neut 1.66.   Iron saturation at 7% and ferritin at 17 ng/ml suggesting ELAINA component. Oral iron has been held at this time. Reassess in 3 months.      The patient and his wife had come for follow-up of anemia on May 1, 2023, regarding history of myelodysplastic syndrome.  She underwent aortic valve replacement and received 2 units of packed red blood cell transfusion in March 2023.  Initially did very  well but is now beginning to feel weak and tired.  I have recommended Ferrex 150 mg p.o. half an hour after food daily.  I explained to the patient and family that even after iron replacement therapy if he continues anemic would recommend initiating Procrit  the patient and family are agreeable will now return in 4 weeks.     The patient had come for follow-up on June 5, 2023 regarding history of multifactorial anemia including myelodysplastic syndrome 7 q. minus.  The patient underwent aortic valve replacement in March 2023 did receive 2 units of packed red blood cell transfusions.   Initially felt better but is now beginning to feel weak and tired.  Further evaluation revealed hemoglobin down to 9.6 g/dL and iron saturation down to 6%.  Most likely source of iron deficiency is blood loss during surgery.  I have recommended intravenous  Feraheme 510 mg/week x 2 weeks effect side effects explained the patient understood appreciated all the details provided and was grateful.  Return in 8 weeks.  Will consider Procrit if anemia persists.      The patient and his wife had come for follow-up on January 29, 2024 regarding history of multifactorial anemia including iron deficiency component myelodysplastic syndrome 7 q. minus.  The patient underwent aortic valve replacement in March 2023 subsequently  hemoglobin decreased and iron saturation was down to 9%.  The patient  was given a benefit of doubt and received intravenous Feraheme.  Feels better but is not back to baseline.  I have recommended reevaluation of anemia in 3 months and reassess.  Keeping  in mind that the patient has underlying myelodysplastic syndrome.  CBC on January 19, 2024 revealed hemoglobin 11.9 g/dL.  Iron saturation is 10%.  I have given him a benefit of doubt and recommended ferrous sulfate 325 mg p.o. half an hour after food daily.  The patient declined 3 times a day.  The patient to discontinue iron after 1 more month of therapy.  In the past GI evaluation did not reveal obvious source of bleeding.  Prescription sent.  Return in 3 months.  The patient and his wife understood appreciated all the  details provided and were grateful.     2. Prostate cancer  - Followed clinically by PCP, oncology and urology services   - Torsemide 10 mg PO QD  - Lasix 20 mg PO QD      3. Cerebral aneurysm  - S/p cerebral aneurysm repair   - Followed clinically by cardiovascular and neurology services.   -Aspirin 81 mg PO QD      4. CAD   - Followed clinically by PCP, cardiology and cardiovascular services.  Aspirin 81 mg PO QD      5. HTN    - Followed clinically by PCP, cardiology.   - Losartan 100 mg PO QD      6. HLD  - Atorvastatin 20 mg PO QD     7. COPD/asthma  - Ventolin HFA 90 mcg/inh 2 puff inhaled 4 times day     8. Fluid retention   - Lasix 20 mg PO QD   - Torsemide 10 mg PO QD      9. Diverticulosis   - Followed clinically by PCP and GI services.      10. Umbilical hernia  - Followed clinically by PCP and GI services.      11. Arthritis      12. Low back pain   -Oxycodone- acetaminophen 7.5-325 mg PO BID

## 2024-01-30 DIAGNOSIS — E78.2 MIXED HYPERLIPIDEMIA: Primary | ICD-10-CM

## 2024-01-30 DIAGNOSIS — I10 PRIMARY HYPERTENSION: ICD-10-CM

## 2024-01-30 RX ORDER — LOSARTAN POTASSIUM 100 MG/1
100 TABLET ORAL DAILY
Qty: 90 TABLET | Refills: 3 | Status: SHIPPED | OUTPATIENT
Start: 2024-01-30

## 2024-01-30 RX ORDER — ATORVASTATIN CALCIUM 20 MG/1
20 TABLET, FILM COATED ORAL DAILY
Qty: 90 TABLET | Refills: 3 | Status: SHIPPED | OUTPATIENT
Start: 2024-01-30

## 2024-02-23 ENCOUNTER — OFFICE VISIT (OUTPATIENT)
Dept: PRIMARY CARE | Facility: CLINIC | Age: 64
End: 2024-02-23
Payer: COMMERCIAL

## 2024-02-23 VITALS
HEART RATE: 62 BPM | HEIGHT: 68 IN | DIASTOLIC BLOOD PRESSURE: 70 MMHG | BODY MASS INDEX: 38.19 KG/M2 | OXYGEN SATURATION: 91 % | WEIGHT: 252 LBS | SYSTOLIC BLOOD PRESSURE: 136 MMHG

## 2024-02-23 DIAGNOSIS — J43.2 CENTRILOBULAR EMPHYSEMA (MULTI): ICD-10-CM

## 2024-02-23 DIAGNOSIS — G89.29 CHRONIC BILATERAL LOW BACK PAIN WITHOUT SCIATICA: Primary | Chronic | ICD-10-CM

## 2024-02-23 DIAGNOSIS — C61 ADENOCARCINOMA OF PROSTATE (MULTI): ICD-10-CM

## 2024-02-23 DIAGNOSIS — Z51.81 MEDICATION MONITORING ENCOUNTER: ICD-10-CM

## 2024-02-23 DIAGNOSIS — I67.1 BRAIN ANEURYSM (HHS-HCC): ICD-10-CM

## 2024-02-23 DIAGNOSIS — R56.9 SEIZURE (MULTI): ICD-10-CM

## 2024-02-23 DIAGNOSIS — E11.9 TYPE 2 DIABETES MELLITUS WITHOUT COMPLICATION, WITHOUT LONG-TERM CURRENT USE OF INSULIN (MULTI): ICD-10-CM

## 2024-02-23 DIAGNOSIS — I11.0 HYPERTENSIVE HEART DISEASE WITH HEART FAILURE (MULTI): ICD-10-CM

## 2024-02-23 DIAGNOSIS — D46.9 MYELODYSPLASTIC SYNDROME, UNSPECIFIED (MULTI): ICD-10-CM

## 2024-02-23 DIAGNOSIS — R35.0 URINARY FREQUENCY: ICD-10-CM

## 2024-02-23 DIAGNOSIS — M54.50 CHRONIC BILATERAL LOW BACK PAIN WITHOUT SCIATICA: Primary | Chronic | ICD-10-CM

## 2024-02-23 DIAGNOSIS — G89.4 CHRONIC PAIN SYNDROME: ICD-10-CM

## 2024-02-23 DIAGNOSIS — H40.039 ANATOMICAL NARROW ANGLE: ICD-10-CM

## 2024-02-23 PROCEDURE — 1036F TOBACCO NON-USER: CPT | Performed by: STUDENT IN AN ORGANIZED HEALTH CARE EDUCATION/TRAINING PROGRAM

## 2024-02-23 PROCEDURE — 3075F SYST BP GE 130 - 139MM HG: CPT | Performed by: STUDENT IN AN ORGANIZED HEALTH CARE EDUCATION/TRAINING PROGRAM

## 2024-02-23 PROCEDURE — 4010F ACE/ARB THERAPY RXD/TAKEN: CPT | Performed by: STUDENT IN AN ORGANIZED HEALTH CARE EDUCATION/TRAINING PROGRAM

## 2024-02-23 PROCEDURE — 3008F BODY MASS INDEX DOCD: CPT | Performed by: STUDENT IN AN ORGANIZED HEALTH CARE EDUCATION/TRAINING PROGRAM

## 2024-02-23 PROCEDURE — 99214 OFFICE O/P EST MOD 30 MIN: CPT | Performed by: STUDENT IN AN ORGANIZED HEALTH CARE EDUCATION/TRAINING PROGRAM

## 2024-02-23 PROCEDURE — 3078F DIAST BP <80 MM HG: CPT | Performed by: STUDENT IN AN ORGANIZED HEALTH CARE EDUCATION/TRAINING PROGRAM

## 2024-02-23 RX ORDER — HYDROCODONE BITARTRATE AND ACETAMINOPHEN 5; 325 MG/1; MG/1
2 TABLET ORAL 3 TIMES DAILY PRN
Qty: 180 TABLET | Refills: 0 | Status: SHIPPED | OUTPATIENT
Start: 2024-02-23 | End: 2024-03-20 | Stop reason: SDUPTHER

## 2024-02-23 RX ORDER — SOLIFENACIN SUCCINATE 10 MG/1
10 TABLET, FILM COATED ORAL DAILY
Qty: 30 TABLET | Refills: 11 | Status: SHIPPED | OUTPATIENT
Start: 2024-02-23 | End: 2025-02-22

## 2024-02-23 ASSESSMENT — ENCOUNTER SYMPTOMS
DYSPHORIC MOOD: 0
FREQUENCY: 1
FATIGUE: 1
BACK PAIN: 1
ARTHRALGIAS: 1
SHORTNESS OF BREATH: 1
NEUROLOGICAL NEGATIVE: 1
DYSURIA: 0
SLEEP DISTURBANCE: 1
CONSTIPATION: 0

## 2024-02-23 NOTE — PROGRESS NOTES
"Subjective   Patient ID: Jose Gaviria \"Crow\" is a 63 y.o. male who presents for Follow-up (Follow up /Handicap placard renewal).  Doing better since he has been on the 10mg norco 3x per day. OARRS reviewed. No side effects or constipation.     Seeing hematology for his MDS. Had stopped the iron supplementation but was recommended to go back on this for one month.     Reports fatigue. Denies depression.     Chronic urinary frequency and urgency. Significantly affecting his sleep. Tried oxybutynin and was not effective. Interested in trying something else. Hx of prostate cancer s/p radiation.     Has not had a recent eye exam. States at his last one was told he had a condition that prohibited new lenses or a dilated exam. Per chart review has anatomical narrow angle. Poor vision. Interested in potential glasses but not sure what he would be eligible for.     Breathing is about the same. COPD and iron deficiency.     No other concerns today.         Review of Systems   Constitutional:  Positive for fatigue.   HENT: Negative.     Respiratory:  Positive for shortness of breath.    Cardiovascular:  Positive for leg swelling.   Gastrointestinal:  Negative for constipation.   Genitourinary:  Positive for frequency and urgency. Negative for dysuria.   Musculoskeletal:  Positive for arthralgias and back pain.   Neurological: Negative.    Psychiatric/Behavioral:  Positive for sleep disturbance. Negative for dysphoric mood.    All other systems reviewed and are negative.      Objective   Physical Exam  Constitutional:       Appearance: Normal appearance.   Cardiovascular:      Rate and Rhythm: Normal rate and regular rhythm.   Pulmonary:      Effort: Pulmonary effort is normal.      Breath sounds: Normal breath sounds.   Abdominal:      General: Abdomen is flat.      Palpations: Abdomen is soft.   Skin:     General: Skin is warm and dry.   Neurological:      General: No focal deficit present.      Mental Status: He is alert. "   Psychiatric:         Mood and Affect: Mood normal.         Behavior: Behavior normal.         Thought Content: Thought content normal.         Judgment: Judgment normal.         Body mass index is 37.97 kg/m².      Current Outpatient Medications:     albuterol 2.5 mg /3 mL (0.083 %) nebulizer solution, Take by nebulization if needed for wheezing or shortness of breath. INHALE CONTENTS OF 1 VIAL VIA NEBULIZER EVERY 4 TO 6 HOURS, Disp: , Rfl:     albuterol 90 mcg/actuation inhaler, Inhale 2 puffs if needed. EVERY 4 TO 6 HOURS, Disp: , Rfl:     aspirin 81 mg EC tablet, Take 1 tablet (81 mg) by mouth once daily., Disp: , Rfl:     atorvastatin (Lipitor) 20 mg tablet, TAKE 1 TABLET DAILY, Disp: 90 tablet, Rfl: 3    DULoxetine (Cymbalta) 20 mg DR capsule, Take 1 capsule (20 mg) by mouth once daily., Disp: , Rfl:     ferrous sulfate 325 (65 Fe) MG EC tablet, Take 1 tablet (325 mg) by mouth once daily. Ferrous sulfate 325 mg tablet, 1 tablet half an hour after food daily., Disp: 90 tablet, Rfl: 3    fluticasone (Flonase) 50 mcg/actuation nasal spray, Administer 2 sprays into each nostril once daily., Disp: 16 g, Rfl: 1    losartan (Cozaar) 100 mg tablet, TAKE 1 TABLET DAILY, Disp: 90 tablet, Rfl: 3    multivitamin capsule, Take 1 capsule by mouth once daily., Disp: , Rfl:     multivitamin with minerals (multivit-min-iron fum-folic ac) tablet, Take 1 tablet by mouth once daily., Disp: , Rfl:     naloxone (Narcan) 4 mg/0.1 mL nasal spray, Administer 1 mg into affected nostril(s) 1 time. may repeat in 1 to 2 hours if symptoms recur, Disp: , Rfl:     spironolactone (Aldactone) 25 mg tablet, Take 1 tablet (25 mg) by mouth once daily., Disp: 90 tablet, Rfl: 3    tiotropium (Spiriva) 18 mcg inhalation capsule, Place 1 capsule (18 mcg) into inhaler and inhale once daily., Disp: , Rfl:     torsemide (Demadex) 20 mg tablet, Take 1 tablet (20 mg) by mouth 2 times a day., Disp: , Rfl:     Trelegy Ellipta 100-62.5-25 mcg blister with  device, Inhale., Disp: , Rfl:     HYDROcodone-acetaminophen (Norco) 5-325 mg tablet, Take 2 tablets by mouth 3 times a day as needed for severe pain (7 - 10)., Disp: 180 tablet, Rfl: 0    ibuprofen (IBU) 600 mg tablet, Take 1 tablet (600 mg) by mouth every 6 hours if needed., Disp: , Rfl:     solifenacin (VESIcare) 10 mg tablet, Take 1 tablet (10 mg) by mouth once daily. Swallow tablet whole; do not crush, chew, or split., Disp: 30 tablet, Rfl: 11      Assessment/Plan   Diagnoses and all orders for this visit:  Chronic bilateral low back pain without sciatica  Comments:  stable on current regimen 10mg TID   CSA and UDS UTD  Orders:  -     HYDROcodone-acetaminophen (Norco) 5-325 mg tablet; Take 2 tablets by mouth 3 times a day as needed for severe pain (7 - 10).  -     Disability Placard  Medication monitoring encounter  Comments:  due for CSA and UDS next visit  taking appropriately  OARRS reviewed  Chronic pain syndrome  -     HYDROcodone-acetaminophen (Norco) 5-325 mg tablet; Take 2 tablets by mouth 3 times a day as needed for severe pain (7 - 10).  -     Disability Placard  Seizure (CMS/HCC)  Centrilobular emphysema (CMS/HCC)  Urinary frequency  -     solifenacin (VESIcare) 10 mg tablet; Take 1 tablet (10 mg) by mouth once daily. Swallow tablet whole; do not crush, chew, or split.  Anatomical narrow angle  -     Referral to Ophthalmology; Future  Adenocarcinoma of prostate (CMS/HCC)  Brain aneurysm  Hypertensive heart disease with heart failure (CMS/HCC)  Comments:  stable  Myelodysplastic syndrome, unspecified (CMS/HCC)  Comments:  follows with hematology  Type 2 diabetes mellitus without complication, without long-term current use of insulin (CMS/HCC)  Comments:  not on meds, a1c has been well controlled  BMI 37.0-37.9, adult    OARRS report reviewed for Jose Gaviria today and is consistent with prescribed therapy.  We weighed the risks and benefit of this therapy and will continue with the current  plan      Follow up in 3 months       Li Cloud,  02/23/24 12:43 PM

## 2024-03-20 DIAGNOSIS — G89.4 CHRONIC PAIN SYNDROME: ICD-10-CM

## 2024-03-20 DIAGNOSIS — G89.29 CHRONIC BILATERAL LOW BACK PAIN WITHOUT SCIATICA: Chronic | ICD-10-CM

## 2024-03-20 DIAGNOSIS — M54.50 CHRONIC BILATERAL LOW BACK PAIN WITHOUT SCIATICA: Chronic | ICD-10-CM

## 2024-03-20 RX ORDER — HYDROCODONE BITARTRATE AND ACETAMINOPHEN 5; 325 MG/1; MG/1
2 TABLET ORAL 3 TIMES DAILY PRN
Qty: 180 TABLET | Refills: 0 | Status: SHIPPED | OUTPATIENT
Start: 2024-03-20 | End: 2024-04-22 | Stop reason: SDUPTHER

## 2024-03-28 ENCOUNTER — TELEMEDICINE (OUTPATIENT)
Dept: CARDIOLOGY | Facility: CLINIC | Age: 64
End: 2024-03-28
Payer: COMMERCIAL

## 2024-03-28 DIAGNOSIS — Z95.2 S/P TAVR (TRANSCATHETER AORTIC VALVE REPLACEMENT): Primary | ICD-10-CM

## 2024-03-28 PROBLEM — I35.0 AORTIC STENOSIS, SEVERE: Status: RESOLVED | Noted: 2023-05-19 | Resolved: 2024-03-28

## 2024-03-28 PROCEDURE — 4010F ACE/ARB THERAPY RXD/TAKEN: CPT | Performed by: NURSE PRACTITIONER

## 2024-03-28 PROCEDURE — 3008F BODY MASS INDEX DOCD: CPT | Performed by: NURSE PRACTITIONER

## 2024-03-28 PROCEDURE — 99214 OFFICE O/P EST MOD 30 MIN: CPT | Performed by: NURSE PRACTITIONER

## 2024-03-28 RX ORDER — AMOXICILLIN 500 MG/1
CAPSULE ORAL
Qty: 4 CAPSULE | Refills: 5 | Status: SHIPPED | OUTPATIENT
Start: 2024-03-28

## 2024-03-28 NOTE — PROGRESS NOTES
Structural Heart Follow up visit      Jose Gaviria is a 63 y.o. male presents today for 1 year follow up s/p TAVR      reports SOB,KENDALL, fatigue  Recent Hospitalizations  No    patient with   Past Medical History:   Diagnosis Date    Encounter for immunization 09/26/2016    Flu vaccine need    Personal history of other diseases of the musculoskeletal system and connective tissue 04/14/2014    History of low back pain    Unspecified open wound of unspecified finger without damage to nail, subsequent encounter 06/11/2020    Wound, open, finger, subsequent encounter       No results found for this or any previous visit (from the past 96 hour(s)).     No echocardiogram results found for the past 12 months                Heart Failure Follow up    NYHA class 2    Edema Denies  Dyspnea on Exertion Improved  Fatigue Stable  Exercise Intolerance Improved  Orthopnea Denies  PND Denies    Chest pain No  Syncope No  Palpitations No    All organ systems normal except: COPD, MDS                 KCCQ Questionnaire    1  Heart failure affects different people in different ways. Some feel shortness of breath while others feel fatigue. Please indicate how much you are limited by heart failure (shortness of breath or fatigue) in your ability to do the following activities over the past 2 weeks.     A.) Showering/bathing  5. Not at All  B.) Walking 1 block on level ground 4. Slightly  C.) Hurrying or Jogging   2. Quite a bit    2.  Over the past 2 weeks, how many times did you have swelling in your feet, ankles or legs when you woke up in the morning? 5. Never    3.  Over the past 2 weeks, on average, how many times has fatigue limited your ability to do what you wanted? 3. At least once a day    4.  Over the past 2 weeks, on average, how many times has shortness of breath limited your ability to do what you wanted? 4. 3 or more times a week but not every day    5.  Over the past 2 weeks, on average, how many times have you been forced  to sleep sitting up in a chair or with at least 3 pillows to prop you up because of shortness of breath? Never    6. Over the past 2 weeks, how much has your heart failure limited your enjoyment of life? It has slightly limited my enjoyment of life    7. If you had to spend the rest of your life with your heart failure the way it is right now, how would you feel about this? 4. Mostly satisfied    8. How much does your heart failure affect your lifestyle? Please indicate how your heart failure may have limited yourparticipation in the following activities over the past 2 weeks    A.)  Hobbies, recreational activities  4. Slightly limited    B.) Working or doing household chores  4. Slightly limited    C.) Visiting family or friends out of your home  4. Slightly limited        Mr. Gaviria is a 64yo M with a PMHx significant for HFrEF, basilar artery aneurysm, BPH, COPD, cerebral aneurysm s/o coiling and clipping, chronic opiate, diverticulosis, CAD, HLD, HTN, myelodysplastic, pancytopenia, and severe AS s/p TAVR using 34mm Evolut FX via RF primary on 3/28/23 with Dr. Abarca and Dr. Alvarez. His procedure was complicated by development of new LBBB that remained stable. POD#1 EKG: SR with LBBB; HR 77, Alcon 188, . POD#1 TTE: LVEF 50-55%, no AI/PVL, peak/mean AVG 26.8/14, mild MR, trace TR, no PC effusion.  presents today for his one year s/p TAVR follow-up.     Impression  - 1 year s/p TAVR  - Pt appears to be euvolemic with flat neck veins and no BLE edema.  Work of breathing normal with NAD, skin tone without pallor.   - states he is feeling tired all of time and it doesn't take much to make him short of breath.  Pt does have COPD and MDS which could be contributing to his continued symptoms  - HF symptoms:  KENDALL/SOB and fatigue, denies edema  - vitals:  stable  - Overall appears well, however pt is limited by multiple disease processes.  Encouraged to increase his activity as able    1 year ECHO - ordered, needed  for TVT registry      Plan:   - ECHO ordered, will have schedulers assist in arranging, will follow results  - Cont ASA for life  - Cont current medication regimen  - Cont to increase activity  - No further follow-ups with Structural Heart  - f/u with Christiano Baker (Primary Cards) as scheduled  - Life-long Dental SBE prophylaxis needed      Virtual Visit    Mic WADSWORTH, [facility]  Acute Care Nurse Practitioner  Structural Heart / TAVR Team  [phone] (ph)  [phone] (fax)

## 2024-04-15 PROBLEM — J01.90 ACUTE SINUSITIS: Status: ACTIVE | Noted: 2024-04-15

## 2024-04-15 PROBLEM — R61 GENERALIZED HYPERHIDROSIS: Status: ACTIVE | Noted: 2022-12-22

## 2024-04-15 PROBLEM — Z86.79 HISTORY OF AORTIC STENOSIS: Status: RESOLVED | Noted: 2023-10-20 | Resolved: 2024-04-15

## 2024-04-15 PROBLEM — G89.4 CHRONIC PAIN SYNDROME: Status: ACTIVE | Noted: 2024-04-15

## 2024-04-15 PROBLEM — M54.50 LOW BACK PAIN: Status: ACTIVE | Noted: 2024-04-15

## 2024-04-15 PROBLEM — I10 HYPERTENSION: Status: RESOLVED | Noted: 2023-10-20 | Resolved: 2024-04-15

## 2024-04-15 PROBLEM — J90 PLEURAL EFFUSION, NOT ELSEWHERE CLASSIFIED: Status: ACTIVE | Noted: 2023-01-23

## 2024-04-22 ENCOUNTER — HOSPITAL ENCOUNTER (OUTPATIENT)
Dept: CARDIOLOGY | Facility: CLINIC | Age: 64
Discharge: HOME | End: 2024-04-22
Payer: COMMERCIAL

## 2024-04-22 ENCOUNTER — LAB (OUTPATIENT)
Dept: LAB | Facility: CLINIC | Age: 64
End: 2024-04-22
Payer: COMMERCIAL

## 2024-04-22 VITALS
SYSTOLIC BLOOD PRESSURE: 136 MMHG | BODY MASS INDEX: 38.19 KG/M2 | WEIGHT: 252 LBS | DIASTOLIC BLOOD PRESSURE: 70 MMHG | HEIGHT: 68 IN

## 2024-04-22 DIAGNOSIS — Z95.4 PRESENCE OF OTHER HEART-VALVE REPLACEMENT: ICD-10-CM

## 2024-04-22 DIAGNOSIS — G89.29 CHRONIC BILATERAL LOW BACK PAIN WITHOUT SCIATICA: Chronic | ICD-10-CM

## 2024-04-22 DIAGNOSIS — G89.4 CHRONIC PAIN SYNDROME: ICD-10-CM

## 2024-04-22 DIAGNOSIS — D61.818 PANCYTOPENIA (MULTI): ICD-10-CM

## 2024-04-22 DIAGNOSIS — M54.50 CHRONIC BILATERAL LOW BACK PAIN WITHOUT SCIATICA: Chronic | ICD-10-CM

## 2024-04-22 DIAGNOSIS — Z95.2 S/P TAVR (TRANSCATHETER AORTIC VALVE REPLACEMENT): ICD-10-CM

## 2024-04-22 DIAGNOSIS — D69.6 THROMBOCYTOPENIA (CMS-HCC): ICD-10-CM

## 2024-04-22 LAB
ALBUMIN SERPL BCP-MCNC: 3.9 G/DL (ref 3.4–5)
ALP SERPL-CCNC: 78 U/L (ref 33–136)
ALT SERPL W P-5'-P-CCNC: 15 U/L (ref 10–52)
ANION GAP SERPL CALC-SCNC: 11 MMOL/L (ref 10–20)
AST SERPL W P-5'-P-CCNC: 18 U/L (ref 9–39)
BASOPHILS # BLD AUTO: 0.03 X10*3/UL (ref 0–0.1)
BASOPHILS NFR BLD AUTO: 1 %
BILIRUB SERPL-MCNC: 0.7 MG/DL (ref 0–1.2)
BUN SERPL-MCNC: 14 MG/DL (ref 6–23)
CALCIUM SERPL-MCNC: 8.5 MG/DL (ref 8.6–10.3)
CHLORIDE SERPL-SCNC: 106 MMOL/L (ref 98–107)
CO2 SERPL-SCNC: 23 MMOL/L (ref 21–32)
CREAT SERPL-MCNC: 0.91 MG/DL (ref 0.5–1.3)
EGFRCR SERPLBLD CKD-EPI 2021: >90 ML/MIN/1.73M*2
EOSINOPHIL # BLD AUTO: 0.13 X10*3/UL (ref 0–0.7)
EOSINOPHIL NFR BLD AUTO: 4.4 %
ERYTHROCYTE [DISTWIDTH] IN BLOOD BY AUTOMATED COUNT: 17.1 % (ref 11.5–14.5)
FERRITIN SERPL-MCNC: 88 NG/ML (ref 20–300)
FOLATE SERPL-MCNC: >24 NG/ML
GLUCOSE SERPL-MCNC: 107 MG/DL (ref 74–99)
HCT VFR BLD AUTO: 36.4 % (ref 41–52)
HGB BLD-MCNC: 12 G/DL (ref 13.5–17.5)
IMM GRANULOCYTES # BLD AUTO: 0.06 X10*3/UL (ref 0–0.7)
IMM GRANULOCYTES NFR BLD AUTO: 2 % (ref 0–0.9)
IRON SATN MFR SERPL: 17 % (ref 25–45)
IRON SERPL-MCNC: 68 UG/DL (ref 35–150)
LDH SERPL L TO P-CCNC: 219 U/L (ref 84–246)
LYMPHOCYTES # BLD AUTO: 0.97 X10*3/UL (ref 1.2–4.8)
LYMPHOCYTES NFR BLD AUTO: 33 %
MCH RBC QN AUTO: 38.2 PG (ref 26–34)
MCHC RBC AUTO-ENTMCNC: 33 G/DL (ref 32–36)
MCV RBC AUTO: 116 FL (ref 80–100)
MONOCYTES # BLD AUTO: 0.34 X10*3/UL (ref 0.1–1)
MONOCYTES NFR BLD AUTO: 11.6 %
NEUTROPHILS # BLD AUTO: 1.41 X10*3/UL (ref 1.2–7.7)
NEUTROPHILS NFR BLD AUTO: 48 %
NRBC BLD-RTO: 0 /100 WBCS (ref 0–0)
PATH REVIEW-CBC DIFFERENTIAL: NORMAL
PLATELET # BLD AUTO: 165 X10*3/UL (ref 150–450)
POTASSIUM SERPL-SCNC: 4.3 MMOL/L (ref 3.5–5.3)
PROT SERPL-MCNC: 7.1 G/DL (ref 6.4–8.2)
RBC # BLD AUTO: 3.14 X10*6/UL (ref 4.5–5.9)
SODIUM SERPL-SCNC: 136 MMOL/L (ref 136–145)
TIBC SERPL-MCNC: 407 UG/DL (ref 240–445)
TSH SERPL-ACNC: 1.51 MIU/L (ref 0.44–3.98)
UIBC SERPL-MCNC: 339 UG/DL (ref 110–370)
WBC # BLD AUTO: 2.9 X10*3/UL (ref 4.4–11.3)

## 2024-04-22 PROCEDURE — 85060 BLOOD SMEAR INTERPRETATION: CPT | Performed by: INTERNAL MEDICINE

## 2024-04-22 PROCEDURE — 83540 ASSAY OF IRON: CPT | Performed by: INTERNAL MEDICINE

## 2024-04-22 PROCEDURE — 2500000004 HC RX 250 GENERAL PHARMACY W/ HCPCS (ALT 636 FOR OP/ED): Performed by: NURSE PRACTITIONER

## 2024-04-22 PROCEDURE — 82728 ASSAY OF FERRITIN: CPT | Mod: PARLAB | Performed by: INTERNAL MEDICINE

## 2024-04-22 PROCEDURE — 83615 LACTATE (LD) (LDH) ENZYME: CPT | Performed by: INTERNAL MEDICINE

## 2024-04-22 PROCEDURE — 83010 ASSAY OF HAPTOGLOBIN QUANT: CPT | Performed by: INTERNAL MEDICINE

## 2024-04-22 PROCEDURE — 82746 ASSAY OF FOLIC ACID SERUM: CPT | Mod: PARLAB | Performed by: INTERNAL MEDICINE

## 2024-04-22 PROCEDURE — 84075 ASSAY ALKALINE PHOSPHATASE: CPT | Performed by: INTERNAL MEDICINE

## 2024-04-22 PROCEDURE — 84443 ASSAY THYROID STIM HORMONE: CPT | Performed by: INTERNAL MEDICINE

## 2024-04-22 PROCEDURE — 36415 COLL VENOUS BLD VENIPUNCTURE: CPT

## 2024-04-22 PROCEDURE — 93306 TTE W/DOPPLER COMPLETE: CPT

## 2024-04-22 PROCEDURE — 93306 TTE W/DOPPLER COMPLETE: CPT | Performed by: INTERNAL MEDICINE

## 2024-04-22 PROCEDURE — 85025 COMPLETE CBC W/AUTO DIFF WBC: CPT | Performed by: INTERNAL MEDICINE

## 2024-04-22 RX ORDER — HYDROCODONE BITARTRATE AND ACETAMINOPHEN 5; 325 MG/1; MG/1
2 TABLET ORAL 3 TIMES DAILY PRN
Qty: 180 TABLET | Refills: 0 | Status: SHIPPED | OUTPATIENT
Start: 2024-04-22 | End: 2024-05-22 | Stop reason: SDUPTHER

## 2024-04-22 RX ADMIN — PERFLUTREN 2 ML OF DILUTION: 6.52 INJECTION, SUSPENSION INTRAVENOUS at 10:43

## 2024-04-23 LAB
AORTIC VALVE MEAN GRADIENT: 14.9 MMHG
AORTIC VALVE PEAK VELOCITY: 2.92 M/S
AV PEAK GRADIENT: 34.1 MMHG
EJECTION FRACTION APICAL 4 CHAMBER: 53.2
HAPTOGLOB SERPL-MCNC: 120 MG/DL (ref 37–246)
LEFT ATRIUM VOLUME AREA LENGTH INDEX BSA: 37.9 ML/M2
LEFT VENTRICLE INTERNAL DIMENSION DIASTOLE: 5.04 CM (ref 3.5–6)
LV EJECTION FRACTION BIPLANE: 59 %
MITRAL VALVE E/A RATIO: 0.48
RIGHT VENTRICLE FREE WALL PEAK S': 0.9 CM/S
RIGHT VENTRICLE PEAK SYSTOLIC PRESSURE: 42.6 MMHG
TRICUSPID ANNULAR PLANE SYSTOLIC EXCURSION: 1.6 CM

## 2024-04-29 ENCOUNTER — OFFICE VISIT (OUTPATIENT)
Dept: HEMATOLOGY/ONCOLOGY | Facility: CLINIC | Age: 64
End: 2024-04-29
Payer: COMMERCIAL

## 2024-04-29 VITALS
RESPIRATION RATE: 20 BRPM | DIASTOLIC BLOOD PRESSURE: 77 MMHG | SYSTOLIC BLOOD PRESSURE: 152 MMHG | TEMPERATURE: 97.5 F | BODY MASS INDEX: 39.52 KG/M2 | WEIGHT: 259.92 LBS | OXYGEN SATURATION: 90 % | HEART RATE: 65 BPM

## 2024-04-29 DIAGNOSIS — D61.818 PANCYTOPENIA (MULTI): ICD-10-CM

## 2024-04-29 DIAGNOSIS — D69.6 THROMBOCYTOPENIA (CMS-HCC): ICD-10-CM

## 2024-04-29 PROCEDURE — 99214 OFFICE O/P EST MOD 30 MIN: CPT | Performed by: INTERNAL MEDICINE

## 2024-04-29 PROCEDURE — 3078F DIAST BP <80 MM HG: CPT | Performed by: INTERNAL MEDICINE

## 2024-04-29 PROCEDURE — 1036F TOBACCO NON-USER: CPT | Performed by: INTERNAL MEDICINE

## 2024-04-29 PROCEDURE — 3008F BODY MASS INDEX DOCD: CPT | Performed by: INTERNAL MEDICINE

## 2024-04-29 PROCEDURE — 3077F SYST BP >= 140 MM HG: CPT | Performed by: INTERNAL MEDICINE

## 2024-04-29 PROCEDURE — 4010F ACE/ARB THERAPY RXD/TAKEN: CPT | Performed by: INTERNAL MEDICINE

## 2024-04-29 ASSESSMENT — PATIENT HEALTH QUESTIONNAIRE - PHQ9
2. FEELING DOWN, DEPRESSED OR HOPELESS: NOT AT ALL
1. LITTLE INTEREST OR PLEASURE IN DOING THINGS: NOT AT ALL
SUM OF ALL RESPONSES TO PHQ9 QUESTIONS 1 AND 2: 0

## 2024-04-29 ASSESSMENT — PAIN SCALES - GENERAL: PAINLEVEL: 0-NO PAIN

## 2024-04-29 ASSESSMENT — COLUMBIA-SUICIDE SEVERITY RATING SCALE - C-SSRS
6. HAVE YOU EVER DONE ANYTHING, STARTED TO DO ANYTHING, OR PREPARED TO DO ANYTHING TO END YOUR LIFE?: NO
1. IN THE PAST MONTH, HAVE YOU WISHED YOU WERE DEAD OR WISHED YOU COULD GO TO SLEEP AND NOT WAKE UP?: NO
2. HAVE YOU ACTUALLY HAD ANY THOUGHTS OF KILLING YOURSELF?: NO

## 2024-04-29 NOTE — PROGRESS NOTES
"Treatment Synopsis:    myelodysplastic syndrome - unclassifiableb:   Diagnosis:   Presented to Dr. Ovalle for pogressive, mild thrombocytopenia.  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     Treatment:   None to date         History of Present Illness:      ID Statement:    DIPIKA JOHNSON is a 63 year old Male        Chief Complaint: \"I am here for my MDS\"   Interval History:    The patient was referred to me by Dr. Garrison for further evaluation and management of anemia with myelodysplastic syndrome on 08/04/2021.      The patient is a 61 year old with a past medical history significant for prostate cancer, cerebral aneurysm, CAD,  HTN, HLD, COPD/asthma, fluid retention, diverticulosis, umbilical hernia, arthritis and low back pain. The patient has a history of MDS  and has been diagnosed in the past. He has been monitoring his CBC with his primary care physician, and a recent change in his WBC and slight anemia has prompted a re-referral to hematology - where we completed a repeat bone marrow biopsy that continued  to show low grade disease, with the diagnosis of myelodysplastic syndrome due to history of Damian t(1;7) leading to equivalent loss of 7q.  The patient was being treated at [facility] but has moved to the area and is choosing to be treated here at [place].      At interview on 08/04/2021 the patient narrated his past medical history. The patient is asymptomatic and denies  any history of shortness of breath at rest, nausea, vomiting, hematemesis, hematuria.     The patient had come for a follow up on 5/1/2023 regarding his history of multifactorial anemia 7Q-patient underwent aortic valve replacement in March 2023 and received blood transfusions.  " Initially felt better but is now beginning to feel weak and tired.   The patient reports easy fatigability. She is anxious to review the result of the tests performed.      The patient and his wife had come for follow-up on June 5, 2023 regarding history of multifactorial anemia, myelodysplastic syndrome with 7 q. minus.  The patient underwent aortic valve replacement in March 2023 and received blood transfusions.  Initially  felt better but is now beginning to feel weak and tired requesting assistance.     The patient and his wife had come for follow-up on January 29, 2024 regarding history of multifactorial anemia, myelodysplastic syndrome with 7 q. - and iron deficiency component.  The patient underwent aortic valve replacement in March 2023 and received  blood transfusion.  Also had iron deficiency component and received intravenous Feraheme as well.  He is beginning to feel better now.    The patient and his wife had come for follow-up on April 29, 2024 regarding history of multifactorial anemia including iron deficiency component myelodysplastic syndrome 7 q. minus.  The patient was placed on oral iron replacement therapy after surgery for aortic valve placement.  Approximately 6 months ago the patient's hemoglobin was 9 g/dL.  His hemoglobin has slowly improved up to 11.9 g/dL in January 2024.  Oral iron replacement was held.  The patient is beginning to feel better.     Past Medical History:   1. Anemia with MDS  2. Prostate cancer  3. Cerebral aneurysm  4. CAD   5. HTN   6. HLD  7. COPD/asthma  8. Fluid retention   9. Diverticulosis   10. Umbilical hernia   11. Arthritis   12. Low back pain      Past Surgical History:   1. Cerebral aneurysm repair   2. Stent placement   3. Brachytherapy   4.  Aortic valve replacement in March 2023.  The patient received 2 units of packed red blood cell transfusions.  Family Medical History:   1. Reviewed but not relevant.      Last colonoscopy was a few years ago.                               Review of Systems:   ·  System Review All other systems have been reviewed and are negative for complaint.            Allergies and Intolerances:       Intolerances:         ciprofloxacin: Drug, Unknown, Active     Outpatient Medication Profile:  * Patient Currently Takes Medications as of 31-Jul-2023 10:49 documented in Structured Notes         losartan 100 mg oral tablet : Last Dose Taken:  , 1 tab(s) orally once a day         atorvastatin 20 mg oral tablet: Last Dose Taken:  , 1 tab(s) orally once  a day         oxyCODONE-acetaminophen 7.5 mg-325 mg oral tablet: Last Dose Taken:   , 1 tab(s) orally 2 times a day, As Needed         albuterol 2.5 mg/3 mL (0.083%) inhalation solution: Last Dose Taken:   , 3 milliliter(s) inhaled every 4-6 hours, As Needed         albuterol 90 mcg/inh inhalation aerosol: Last Dose Taken:  , 2 puff(s)  inhaled every 4-6 hours, As Needed         spironolactone 25 mg oral tablet: Last Dose Taken:  , 1 tab(s) orally  once a day         aspirin 81 mg oral delayed release tablet: Last Dose Taken:  , 1 tab(s)  orally once a day         Multiple Vitamins with Minerals oral tablet: Last Dose Taken:  , 1 tab(s)  orally once a day         fluticasone/umeclidinium/vilanterol 100 mcg-62.5 mcg-25 mcg/inh inhalation powder : Last Dose Taken:  , 1 puff(s) inhaled once a day         torsemide 20 mg oral tablet: Last Dose Taken:  , 1 tab(s) orally once  a day         Trelegy Ellipta 200 mcg-62.5 mcg-25 mcg/inh inhalation powder: Last Dose  Taken:  , 1 puff(s) inhaled once a day             Medical History:         Severe calcific aortic valve stenosis: ICD-10: I35.0, Status:  Active         Status post right and left heart catheterization: ICD-10:  Z98.890, Status: Active         Bacteremia: ICD-10: R78.81, Status: Active         MDS (myelodysplastic syndrome): ICD-10: D46.9, Status: Active         History of cerebral aneurysm repair: ICD-10: Z98.890, Status:  Active, Description:  coiling for thrombosed basilar tip aneurysm  9/12/17         Personal history of prostate cancer: ICD-10: Z85.46, Status:  Active, Description: 2014         Chronic low back pain: ICD-10: M54.5, Status: Active         Arthritis: ICD-10: M19.90, Status: Active, Description: spine         Umbilical hernia without obstruction or gangrene: ICD-10: K42.9,  Status: Active         History of diverticulosis: ICD-10: Z87.19, Status: Active,  Description: per CT         Myelodysplasia: ICD-10: Q06.9, Status: Active       Surg History:         S/P TAVR (transcatheter aortic valve replacement): ICD-10:  Z95.2, Status: Active         Dyspnea: ICD-10: R06.00, Status: Active         Coronary artery disease: ICD-10: I25.10, Status: Active         Elevated coronary artery calcium score: ICD-10: R93.1, Status:  Active         History of surgery for cerebral aneurysm: ICD-10: Z98.890,  Status: Active, Description: coiling for thrombosed basilar tip aneurysm  9/12/17         History of brachytherapy: ICD-10: Z92.3, Status: Active, Description:  prostate ~2014     Family History: No Family History items are recorded  in the problem list.      Social History:   Social Substance History:  ·  Social History denies smoking, alcohol and drug use   ·  Smoking Status never smoker (1)   ·  Tobacco Use denies   ·  Alcohol Use denies   ·  Drug Use denies            Vitals and Measurements:   Vitals: Temp: 36.4  HR: 71  RR: 18  BP: 139/63  SPO2%:   91   Measurements: HT(cm): 173.5  WT(kg): 108.8  BSA:  2.28  BMI:  36.1      Physical Exam:      Constitutional: overwt middle aged man, no acute  distress   Eyes: clear sclera, conjunctival normal, pupils equal   ENMT: mucous membranes moist, no apparent injury,  no lesions seen   Head/Neck: Neck supple, no apparent injury, thyroid  without mass or tenderness, No JVD, trachea midline, no bruits   Respiratory/Thorax: good chest expansion, hollow  to percussion, normal tactile fremitous   Cardiovascular:  Regular, rate and rhythm, no murmurs,  +2 radial and dorsalis pedis pulses.  He has trace edema bilaterally   Gastrointestinal: Nondistended, soft, non-tender,  no rebound tenderness or guarding.  No hepatosplenomegaly on careful exam, but this exam is limited by body habitus   Genitourinary: No Discharge, vesicles or other abnormalities   Musculoskeletal: ROM intact, no joint swelling, normal  strength   Extremities: normal extremities, see cardiat   Neurological: alert and oriented x3, intact senses,  motor, response and reflexes, normal strength   Lymphatic: No significant cervical, supraclavicular,  axillary lymphadenopathy   Psychological: Appropriate mood and behavior   Skin: Warm and dry, no lesions, no rashes         Lab Results:           Assessment and Plan:       1. Anemia with MDS     The patient was referred to me by Dr. Garrison for further evaluation and management of anemia with myelodysplastic syndrome on 08/04/2021.      The patient is a 61 year old with a past medical history significant for prostate cancer, cerebral aneurysm, CAD,  HTN, HLD, COPD/asthma, fluid retention, diverticulosis, umbilical hernia, arthritis and low back pain. The patient has a history of MDS  and has been diagnosed in the past. He has been monitoring his CBC with his primary care physician, and a recent change in his WBC and slight anemia has prompted a re-referral to hematology - where we completed a repeat bone marrow biopsy that continued  to show low grade disease, with the diagnosis of myelodysplastic syndrome due to history of Damian t(1;7) leading to equivalent loss of 7q.  The patient was being treated at [facility] but has moved to the area and is choosing to be treated here at [place].  Physical exam was within normal limits. I reviewed the lab data with the patient. CBC obtained on 07/23/2021 revealed WBC 3.5, HGB 9.3, HCT 32.5, , Neut 1.15. I had a detailed discussion with the patient and explained to him  the pathophysiology  of myelodysplastic syndrome. Given that he has had this syndrome for a few years, the patient is well versed on his condition and understanding of the consequences and treatment for MDS. I feel that it would be prudent to rule out any other cause of the  anemia such as hemolysis vs others. I recommended baseline studies and  US of the abdomen. Return in 1 week.      The patient had come for a follow up on 03/29/2022 regarding his history of multifactorial anemia M7Q-. Physical exam was within normal limits. I reviewed th lab data with the patient. CBC obtained on 03/25/2022 revealed WBC 3.7, HGB 13.1, HCT 39.8, PLT  123, Neut 1.66.   Iron saturation at 7% and ferritin at 17 ng/ml suggesting ELAINA component. Oral iron has been held at this time. Reassess in 3 months.      The patient and his wife had come for follow-up of anemia on May 1, 2023, regarding history of myelodysplastic syndrome.  She underwent aortic valve replacement and received 2 units of packed red blood cell transfusion in March 2023.  Initially did very  well but is now beginning to feel weak and tired.  I have recommended Ferrex 150 mg p.o. half an hour after food daily.  I explained to the patient and family that even after iron replacement therapy if he continues anemic would recommend initiating Procrit  the patient and family are agreeable will now return in 4 weeks.     The patient had come for follow-up on June 5, 2023 regarding history of multifactorial anemia including myelodysplastic syndrome 7 q. minus.  The patient underwent aortic valve replacement in March 2023 did receive 2 units of packed red blood cell transfusions.   Initially felt better but is now beginning to feel weak and tired.  Further evaluation revealed hemoglobin down to 9.6 g/dL and iron saturation down to 6%.  Most likely source of iron deficiency is blood loss during surgery.  I have recommended intravenous  Feraheme 510 mg/week x 2 weeks effect side  effects explained the patient understood appreciated all the details provided and was grateful.  Return in 8 weeks.  Will consider Procrit if anemia persists.      The patient and his wife had come for follow-up on January 29, 2024 regarding history of multifactorial anemia including iron deficiency component myelodysplastic syndrome 7 q. minus.  The patient underwent aortic valve replacement in March 2023 subsequently  hemoglobin decreased and iron saturation was down to 9%.  The patient was given a benefit of doubt and received intravenous Feraheme.  Feels better but is not back to baseline.  I have recommended reevaluation of anemia in 3 months and reassess.  Keeping  in mind that the patient has underlying myelodysplastic syndrome.  CBC on January 19, 2024 revealed hemoglobin 11.9 g/dL.  Iron saturation is 10%.  I have given him a benefit of doubt and recommended ferrous sulfate 325 mg p.o. half an hour after food daily.  The patient declined 3 times a day.  The patient to discontinue iron after 1 more month of therapy.  In the past GI evaluation did not reveal obvious source of bleeding.  Prescription sent.  Return in 3 months.  The patient and his wife understood appreciated all the  details provided and were grateful.    The patient and his wife had come for follow-up on April 29, 2024 regarding history of multifactorial anemia including iron deficiency component myelodysplastic syndrome 7 q. minus.  The patient was placed on oral iron replacement therapy after surgery for aortic valve placement.  Approximately 6 months ago the patient's hemoglobin was 9 g/dL.  His hemoglobin has slowly improved up to 11.9 g/dL in January 2024.  Oral iron replacement was held.  The patient is beginning to feel better.  CBC today revealed hemoglobin 12 g/dL ferritin 85 NG per mL iron saturation at 17%.  I had a long discussion with the patient and his wife explained to them about pathophysiology of myelodysplastic syndrome and  elevation iron deficiency component.  The patient does not want to take iron replacement therapy for now.  Requested reassessment in 3 months.     2. Prostate cancer  - Followed clinically by PCP, oncology and urology services   - Torsemide 10 mg PO QD  - Lasix 20 mg PO QD      3. Cerebral aneurysm  - S/p cerebral aneurysm repair   - Followed clinically by cardiovascular and neurology services.   -Aspirin 81 mg PO QD      4. CAD   - Followed clinically by PCP, cardiology and cardiovascular services.  Aspirin 81 mg PO QD      5. HTN    - Followed clinically by PCP, cardiology.   - Losartan 100 mg PO QD      6. HLD  - Atorvastatin 20 mg PO QD     7. COPD/asthma  - Ventolin HFA 90 mcg/inh 2 puff inhaled 4 times day     8. Fluid retention   - Lasix 20 mg PO QD   - Torsemide 10 mg PO QD      9. Diverticulosis   - Followed clinically by PCP and GI services.      10. Umbilical hernia  - Followed clinically by PCP and GI services.      11. Arthritis      12. Low back pain   -Oxycodone- acetaminophen 7.5-325 mg PO BID

## 2024-04-30 ENCOUNTER — APPOINTMENT (OUTPATIENT)
Dept: CARDIOLOGY | Facility: CLINIC | Age: 64
End: 2024-04-30
Payer: COMMERCIAL

## 2024-05-22 DIAGNOSIS — G89.4 CHRONIC PAIN SYNDROME: ICD-10-CM

## 2024-05-22 DIAGNOSIS — M54.50 CHRONIC BILATERAL LOW BACK PAIN WITHOUT SCIATICA: Chronic | ICD-10-CM

## 2024-05-22 DIAGNOSIS — G89.29 CHRONIC BILATERAL LOW BACK PAIN WITHOUT SCIATICA: Chronic | ICD-10-CM

## 2024-05-22 RX ORDER — HYDROCODONE BITARTRATE AND ACETAMINOPHEN 5; 325 MG/1; MG/1
2 TABLET ORAL 3 TIMES DAILY PRN
Qty: 180 TABLET | Refills: 0 | Status: SHIPPED | OUTPATIENT
Start: 2024-05-22 | End: 2024-06-21

## 2024-06-04 ENCOUNTER — APPOINTMENT (OUTPATIENT)
Dept: PRIMARY CARE | Facility: CLINIC | Age: 64
End: 2024-06-04
Payer: COMMERCIAL

## 2024-06-24 ENCOUNTER — APPOINTMENT (OUTPATIENT)
Dept: PRIMARY CARE | Facility: CLINIC | Age: 64
End: 2024-06-24
Payer: COMMERCIAL

## 2024-06-24 VITALS
HEIGHT: 68 IN | OXYGEN SATURATION: 91 % | BODY MASS INDEX: 37.89 KG/M2 | HEART RATE: 73 BPM | SYSTOLIC BLOOD PRESSURE: 130 MMHG | DIASTOLIC BLOOD PRESSURE: 70 MMHG | WEIGHT: 250 LBS

## 2024-06-24 DIAGNOSIS — M54.50 CHRONIC BILATERAL LOW BACK PAIN WITHOUT SCIATICA: Primary | Chronic | ICD-10-CM

## 2024-06-24 DIAGNOSIS — G89.4 CHRONIC PAIN SYNDROME: ICD-10-CM

## 2024-06-24 DIAGNOSIS — N40.0 ENLARGED PROSTATE: ICD-10-CM

## 2024-06-24 DIAGNOSIS — E11.9 TYPE 2 DIABETES MELLITUS WITHOUT COMPLICATION, WITHOUT LONG-TERM CURRENT USE OF INSULIN (MULTI): Chronic | ICD-10-CM

## 2024-06-24 DIAGNOSIS — R39.15 URGENCY OF URINATION: ICD-10-CM

## 2024-06-24 DIAGNOSIS — N32.81 OVERACTIVE BLADDER: ICD-10-CM

## 2024-06-24 DIAGNOSIS — G89.29 CHRONIC BILATERAL LOW BACK PAIN WITHOUT SCIATICA: Primary | Chronic | ICD-10-CM

## 2024-06-24 DIAGNOSIS — J01.10 ACUTE NON-RECURRENT FRONTAL SINUSITIS: ICD-10-CM

## 2024-06-24 DIAGNOSIS — R35.0 URINARY FREQUENCY: Chronic | ICD-10-CM

## 2024-06-24 LAB — HBA1C MFR BLD: 5.2 % (ref 4.2–6.5)

## 2024-06-24 PROCEDURE — 3078F DIAST BP <80 MM HG: CPT | Performed by: STUDENT IN AN ORGANIZED HEALTH CARE EDUCATION/TRAINING PROGRAM

## 2024-06-24 PROCEDURE — 1159F MED LIST DOCD IN RCRD: CPT | Performed by: STUDENT IN AN ORGANIZED HEALTH CARE EDUCATION/TRAINING PROGRAM

## 2024-06-24 PROCEDURE — 1160F RVW MEDS BY RX/DR IN RCRD: CPT | Performed by: STUDENT IN AN ORGANIZED HEALTH CARE EDUCATION/TRAINING PROGRAM

## 2024-06-24 PROCEDURE — 83036 HEMOGLOBIN GLYCOSYLATED A1C: CPT | Mod: CLIA WAIVED TEST | Performed by: STUDENT IN AN ORGANIZED HEALTH CARE EDUCATION/TRAINING PROGRAM

## 2024-06-24 PROCEDURE — 4010F ACE/ARB THERAPY RXD/TAKEN: CPT | Performed by: STUDENT IN AN ORGANIZED HEALTH CARE EDUCATION/TRAINING PROGRAM

## 2024-06-24 PROCEDURE — 3075F SYST BP GE 130 - 139MM HG: CPT | Performed by: STUDENT IN AN ORGANIZED HEALTH CARE EDUCATION/TRAINING PROGRAM

## 2024-06-24 PROCEDURE — 99214 OFFICE O/P EST MOD 30 MIN: CPT | Performed by: STUDENT IN AN ORGANIZED HEALTH CARE EDUCATION/TRAINING PROGRAM

## 2024-06-24 PROCEDURE — 3008F BODY MASS INDEX DOCD: CPT | Performed by: STUDENT IN AN ORGANIZED HEALTH CARE EDUCATION/TRAINING PROGRAM

## 2024-06-24 PROCEDURE — 3044F HG A1C LEVEL LT 7.0%: CPT | Performed by: STUDENT IN AN ORGANIZED HEALTH CARE EDUCATION/TRAINING PROGRAM

## 2024-06-24 RX ORDER — AMOXICILLIN AND CLAVULANATE POTASSIUM 875; 125 MG/1; MG/1
875 TABLET, FILM COATED ORAL 2 TIMES DAILY
Qty: 20 TABLET | Refills: 0 | Status: SHIPPED | OUTPATIENT
Start: 2024-06-24 | End: 2024-07-04

## 2024-06-24 RX ORDER — HYDROCODONE BITARTRATE AND ACETAMINOPHEN 5; 325 MG/1; MG/1
2 TABLET ORAL 3 TIMES DAILY PRN
Qty: 180 TABLET | Refills: 0 | Status: SHIPPED | OUTPATIENT
Start: 2024-06-24 | End: 2024-07-24

## 2024-06-24 RX ORDER — FINASTERIDE 5 MG/1
5 TABLET, FILM COATED ORAL DAILY
Qty: 30 TABLET | Refills: 1 | Status: SHIPPED | OUTPATIENT
Start: 2024-06-24 | End: 2025-06-24

## 2024-06-25 ASSESSMENT — ENCOUNTER SYMPTOMS
COUGH: 1
FREQUENCY: 1
GASTROINTESTINAL NEGATIVE: 1
HEADACHES: 1
ARTHRALGIAS: 1
BACK PAIN: 1
RHINORRHEA: 1
SLEEP DISTURBANCE: 1
CARDIOVASCULAR NEGATIVE: 1
SINUS PRESSURE: 1
FATIGUE: 1

## 2024-07-02 ENCOUNTER — APPOINTMENT (OUTPATIENT)
Dept: CARDIOLOGY | Facility: CLINIC | Age: 64
End: 2024-07-02
Payer: COMMERCIAL

## 2024-07-02 VITALS — HEART RATE: 78 BPM | DIASTOLIC BLOOD PRESSURE: 74 MMHG | SYSTOLIC BLOOD PRESSURE: 132 MMHG

## 2024-07-02 DIAGNOSIS — Z95.4 PRESENCE OF OTHER HEART-VALVE REPLACEMENT: Primary | ICD-10-CM

## 2024-07-02 DIAGNOSIS — R06.09 DOE (DYSPNEA ON EXERTION): ICD-10-CM

## 2024-07-02 DIAGNOSIS — Z95.2 S/P TAVR (TRANSCATHETER AORTIC VALVE REPLACEMENT): ICD-10-CM

## 2024-07-02 DIAGNOSIS — J43.9 PULMONARY EMPHYSEMA, UNSPECIFIED EMPHYSEMA TYPE (MULTI): ICD-10-CM

## 2024-07-02 PROCEDURE — 4010F ACE/ARB THERAPY RXD/TAKEN: CPT | Performed by: PHYSICIAN ASSISTANT

## 2024-07-02 PROCEDURE — 99215 OFFICE O/P EST HI 40 MIN: CPT | Performed by: PHYSICIAN ASSISTANT

## 2024-07-02 PROCEDURE — 3075F SYST BP GE 130 - 139MM HG: CPT | Performed by: PHYSICIAN ASSISTANT

## 2024-07-02 PROCEDURE — 3078F DIAST BP <80 MM HG: CPT | Performed by: PHYSICIAN ASSISTANT

## 2024-07-02 PROCEDURE — 93000 ELECTROCARDIOGRAM COMPLETE: CPT | Performed by: PHYSICIAN ASSISTANT

## 2024-07-02 PROCEDURE — 1160F RVW MEDS BY RX/DR IN RCRD: CPT | Performed by: PHYSICIAN ASSISTANT

## 2024-07-02 PROCEDURE — 3008F BODY MASS INDEX DOCD: CPT | Performed by: PHYSICIAN ASSISTANT

## 2024-07-02 PROCEDURE — 1159F MED LIST DOCD IN RCRD: CPT | Performed by: PHYSICIAN ASSISTANT

## 2024-07-02 PROCEDURE — 3044F HG A1C LEVEL LT 7.0%: CPT | Performed by: PHYSICIAN ASSISTANT

## 2024-07-02 NOTE — PROGRESS NOTES
Chief Complaint:   Follow-up (Yearly )     History Of Present Illness:    Crow Gaviria is a 64 y.o. male presenting s/p BRIANDA with residual dyspnea on minimal exertion.  Interestingly, most recent CT chest following BRIANDA revealed severe pulmonary emphysema.  Discussed with patient that this underlying condition in addition to pancytopenia is likely the paramount contributing factors to his persistent dyspnea.  Will provide referral to pulmonology for further evaluation and treatment.  Patient denies chest pain, chest pressure, palpitations, shortness of breath at rest, diaphoresis, nausea/vomiting, back pain, headache, lightheadedness, dizziness, syncope or presyncopal episodes, active bleeding signs or symptoms, excessive weight gain, muscle or joint pain, claudication.       Last Recorded Vitals:  Vitals:    07/02/24 0933   BP: 132/74   BP Location: Left arm   Patient Position: Sitting   BP Cuff Size: Adult   Pulse: 78       Past Medical History:  He has a past medical history of Encounter for immunization (09/26/2016), Personal history of other diseases of the musculoskeletal system and connective tissue (04/14/2014), and Unspecified open wound of unspecified finger without damage to nail, subsequent encounter (06/11/2020).    Past Surgical History:  He has a past surgical history that includes Other surgical history (01/28/2019); Other surgical history (01/28/2019); and Other surgical history (01/28/2019).      Social History:  He reports that he quit smoking about 6 years ago. His smoking use included cigarettes. He has never used smokeless tobacco. No history on file for alcohol use and drug use.    Family History:  Family History   Problem Relation Name Age of Onset    Heart attack Mother      Heart attack Father          Allergies:  Ciprofloxacin    Outpatient Medications:  Current Outpatient Medications   Medication Instructions    albuterol 2.5 mg /3 mL (0.083 %) nebulizer solution nebulization, As needed,  INHALE CONTENTS OF 1 VIAL VIA NEBULIZER EVERY 4 TO 6 HOURS    albuterol 90 mcg/actuation inhaler 2 puffs, inhalation, As needed, EVERY 4 TO 6 HOURS    amoxicillin (Amoxil) 500 mg capsule Take 4 caps (2000 mg) 30-60mins prior to your dental appointment    amoxicillin-pot clavulanate (Augmentin) 875-125 mg tablet 875 mg, oral, 2 times daily    aspirin 81 mg EC tablet 1 tablet, oral, Daily    atorvastatin (LIPITOR) 20 mg, oral, Daily    DULoxetine (CYMBALTA) 20 mg, oral, Daily    ferrous sulfate 325 mg, oral, Daily, Ferrous sulfate 325 mg tablet, 1 tablet half an hour after food daily.    finasteride (PROSCAR) 5 mg, oral, Daily, Do not crush, chew, or split.    fluticasone (Flonase) 50 mcg/actuation nasal spray 2 sprays, Each Nostril, Daily    HYDROcodone-acetaminophen (Norco) 5-325 mg tablet 2 tablets, oral, 3 times daily PRN    ibuprofen (IBU) 600 mg, oral, Every 6 hours PRN    losartan (COZAAR) 100 mg, oral, Daily    multivitamin capsule 1 capsule, oral, Daily    multivitamin with minerals (multivit-min-iron fum-folic ac) tablet 1 tablet, oral, Daily    naloxone (NARCAN) 1 mg, nasal, Once, may repeat in 1 to 2 hours if symptoms recur<BR>    solifenacin (VESICARE) 10 mg, oral, Daily, Swallow tablet whole; do not crush, chew, or split.    spironolactone (ALDACTONE) 25 mg, oral, Daily    tiotropium (Spiriva) 18 mcg inhalation capsule 1 capsule, inhalation, Daily RT    torsemide (Demadex) 20 mg tablet 1 tablet, oral, 2 times daily    Trelegy Ellipta 100-62.5-25 mcg blister with device inhalation       Physical Exam:  Constitutional: awake and alert, oriented ×3, no apparent distress  Skin: warm, dry, good turgor no obvious lesions  Eyes: pupils equal, round, reactive to light, conjunctiva pink and noninjected, no discharge  HENT: normocephalic and atraumatic, mucous membranes moist, trachea midline with no masses/goiter  Cardiovascular: S1/S2 regular, 1/6 systolic murmur no rubs/gallops, no carotid bruits, no  JVD  Pulmonary: symmetrical chest expansion, lungs are clear to auscultation bilaterally, no wheezes/rales/rhonchi, normal effort  Abdomen: nontender, nondistended, active bowel sounds, no ascites  Extremities: no cyanosis, clubbing, no LE edema no lesions; palpable pedal pulses  Neurologic: cranial nerves II - XII grossly intact, stable gait, no tremor       Last Labs:  CBC -  Lab Results   Component Value Date    WBC 2.9 (L) 04/22/2024    HGB 12.0 (L) 04/22/2024    HCT 36.4 (L) 04/22/2024     (H) 04/22/2024     04/22/2024       CMP -  Lab Results   Component Value Date    CALCIUM 8.5 (L) 04/22/2024    PHOS CANCELED 03/30/2023    PROT 7.1 04/22/2024    ALBUMIN 3.9 04/22/2024    AST 18 04/22/2024    ALT 15 04/22/2024    ALKPHOS 78 04/22/2024    BILITOT 0.7 04/22/2024       LIPID PANEL -   Lab Results   Component Value Date    CHOL 107 07/23/2021    TRIG 103 07/23/2021    HDL 23.7 (A) 07/23/2021    CHHDL 4.5 07/23/2021    LDLF 63 07/23/2021    VLDL 21 07/23/2021       RENAL FUNCTION PANEL -   Lab Results   Component Value Date    GLUCOSE 107 (H) 04/22/2024     04/22/2024    K 4.3 04/22/2024     04/22/2024    CO2 23 04/22/2024    ANIONGAP 11 04/22/2024    BUN 14 04/22/2024    CREATININE 0.91 04/22/2024    GFRMALE >90 07/28/2023    CALCIUM 8.5 (L) 04/22/2024    PHOS CANCELED 03/30/2023    ALBUMIN 3.9 04/22/2024        Lab Results   Component Value Date     (H) 04/29/2020    HGBA1C 5.2 06/24/2024    HGBA1C 5.3 10/23/2023       Last Cardiology Tests:  ECG:  No results found for this or any previous visit from the past 1095 days.      Echo:  Transthoracic echo (TTE) complete 04/22/2024  1. Left ventricular systolic function is normal with a 55-60% estimated ejection fraction.   2. Spectral Doppler shows an impaired relaxation pattern of left ventricular diastolic filling.   3. Mildly elevated RVSP.   4. There is a well-seated TAVR valve in place. Peak gradient 34 mmHg mild aortic  "regurgitation.   5. Mild aortic valve regurgitation.   6. The ascending aorta is mildly dilated measuring 4 cm.    Ejection Fractions:  No results found for: \"EF\"    Cath:  No results found for this or any previous visit from the past 1095 days.      Stress Test:  No results found for this or any previous visit from the past 1095 days.      Cardiac Imaging:  No results found for this or any previous visit from the past 1095 days.      Assessment/Plan   Problem List Items Addressed This Visit             ICD-10-CM       Cardiac and Vasculature    KENDALL (dyspnea on exertion) R06.09    Relevant Orders    ECG 12 lead (Clinic Performed)    Referral to Pulmonology    S/P TAVR (transcatheter aortic valve replacement) Z95.2    Relevant Orders    ECG 12 lead (Clinic Performed)    Presence of other heart-valve replacement - Primary Z95.4    Relevant Orders    ECG 12 lead (Clinic Performed)       Pulmonary and Pneumonias    COPD (chronic obstructive pulmonary disease) (Multi) J44.9    Relevant Orders    Referral to Pulmonology       -Referral to pulmonology regarding severe emphysema    -S/P BRIANDA, normal LVEF on 2D echo with mild regurgitation of transcatheter aortic valve    -F/U with me in 6 months      Christiano Baker PA-C  "

## 2024-07-22 ENCOUNTER — LAB (OUTPATIENT)
Dept: LAB | Facility: CLINIC | Age: 64
End: 2024-07-22
Payer: COMMERCIAL

## 2024-07-22 DIAGNOSIS — D61.818 PANCYTOPENIA (MULTI): ICD-10-CM

## 2024-07-22 DIAGNOSIS — D69.6 THROMBOCYTOPENIA (CMS-HCC): ICD-10-CM

## 2024-07-22 LAB
ALBUMIN SERPL BCP-MCNC: 3.8 G/DL (ref 3.4–5)
ALP SERPL-CCNC: 97 U/L (ref 33–136)
ALT SERPL W P-5'-P-CCNC: 18 U/L (ref 10–52)
ANION GAP SERPL CALC-SCNC: 10 MMOL/L (ref 10–20)
AST SERPL W P-5'-P-CCNC: 19 U/L (ref 9–39)
BASOPHILS # BLD MANUAL: 0 X10*3/UL (ref 0–0.1)
BASOPHILS NFR BLD MANUAL: 0 %
BILIRUB SERPL-MCNC: 0.8 MG/DL (ref 0–1.2)
BUN SERPL-MCNC: 17 MG/DL (ref 6–23)
CALCIUM SERPL-MCNC: 8.8 MG/DL (ref 8.6–10.3)
CHLORIDE SERPL-SCNC: 106 MMOL/L (ref 98–107)
CO2 SERPL-SCNC: 26 MMOL/L (ref 21–32)
CREAT SERPL-MCNC: 0.86 MG/DL (ref 0.5–1.3)
EGFRCR SERPLBLD CKD-EPI 2021: >90 ML/MIN/1.73M*2
EOSINOPHIL # BLD MANUAL: 0.06 X10*3/UL (ref 0–0.7)
EOSINOPHIL NFR BLD MANUAL: 2 %
ERYTHROCYTE [DISTWIDTH] IN BLOOD BY AUTOMATED COUNT: 18.4 % (ref 11.5–14.5)
FERRITIN SERPL-MCNC: 90 NG/ML (ref 20–300)
GLUCOSE SERPL-MCNC: 118 MG/DL (ref 74–99)
HCT VFR BLD AUTO: 39.8 % (ref 41–52)
HGB BLD-MCNC: 13.4 G/DL (ref 13.5–17.5)
IMM GRANULOCYTES # BLD AUTO: 0.05 X10*3/UL (ref 0–0.7)
IMM GRANULOCYTES NFR BLD AUTO: 1.6 % (ref 0–0.9)
IRON SATN MFR SERPL: 23 % (ref 25–45)
IRON SERPL-MCNC: 93 UG/DL (ref 35–150)
LYMPHOCYTES # BLD MANUAL: 0.48 X10*3/UL (ref 1.2–4.8)
LYMPHOCYTES NFR BLD MANUAL: 16 %
MCH RBC QN AUTO: 36.8 PG (ref 26–34)
MCHC RBC AUTO-ENTMCNC: 33.7 G/DL (ref 32–36)
MCV RBC AUTO: 109 FL (ref 80–100)
MONOCYTES # BLD MANUAL: 0.21 X10*3/UL (ref 0.1–1)
MONOCYTES NFR BLD MANUAL: 7 %
NEUTROPHILS # BLD MANUAL: 2.25 X10*3/UL (ref 1.2–7.7)
NEUTS BAND # BLD MANUAL: 0.03 X10*3/UL (ref 0–0.7)
NEUTS BAND NFR BLD MANUAL: 1 %
NEUTS SEG # BLD MANUAL: 2.22 X10*3/UL (ref 1.2–7)
NEUTS SEG NFR BLD MANUAL: 74 %
NRBC BLD-RTO: 0 /100 WBCS (ref 0–0)
OVALOCYTES BLD QL SMEAR: ABNORMAL
PLATELET # BLD AUTO: 166 X10*3/UL (ref 150–450)
POTASSIUM SERPL-SCNC: 4.2 MMOL/L (ref 3.5–5.3)
PROT SERPL-MCNC: 7.2 G/DL (ref 6.4–8.2)
RBC # BLD AUTO: 3.64 X10*6/UL (ref 4.5–5.9)
RBC MORPH BLD: ABNORMAL
SODIUM SERPL-SCNC: 138 MMOL/L (ref 136–145)
TIBC SERPL-MCNC: 412 UG/DL (ref 240–445)
TOTAL CELLS COUNTED BLD: 100
UIBC SERPL-MCNC: 319 UG/DL (ref 110–370)
WBC # BLD AUTO: 3 X10*3/UL (ref 4.4–11.3)

## 2024-07-22 PROCEDURE — 36415 COLL VENOUS BLD VENIPUNCTURE: CPT

## 2024-07-22 PROCEDURE — 82728 ASSAY OF FERRITIN: CPT | Mod: PARLAB | Performed by: INTERNAL MEDICINE

## 2024-07-22 PROCEDURE — 84075 ASSAY ALKALINE PHOSPHATASE: CPT | Performed by: INTERNAL MEDICINE

## 2024-07-22 PROCEDURE — 85007 BL SMEAR W/DIFF WBC COUNT: CPT | Performed by: INTERNAL MEDICINE

## 2024-07-22 PROCEDURE — 85027 COMPLETE CBC AUTOMATED: CPT | Performed by: INTERNAL MEDICINE

## 2024-07-22 PROCEDURE — 83540 ASSAY OF IRON: CPT | Performed by: INTERNAL MEDICINE

## 2024-07-24 DIAGNOSIS — G89.29 CHRONIC BILATERAL LOW BACK PAIN WITHOUT SCIATICA: Chronic | ICD-10-CM

## 2024-07-24 DIAGNOSIS — G89.4 CHRONIC PAIN SYNDROME: ICD-10-CM

## 2024-07-24 DIAGNOSIS — M54.50 CHRONIC BILATERAL LOW BACK PAIN WITHOUT SCIATICA: Chronic | ICD-10-CM

## 2024-07-24 RX ORDER — HYDROCODONE BITARTRATE AND ACETAMINOPHEN 5; 325 MG/1; MG/1
2 TABLET ORAL 3 TIMES DAILY PRN
Qty: 180 TABLET | Refills: 0 | Status: SHIPPED | OUTPATIENT
Start: 2024-07-24 | End: 2024-08-23

## 2024-07-29 ENCOUNTER — OFFICE VISIT (OUTPATIENT)
Dept: HEMATOLOGY/ONCOLOGY | Facility: CLINIC | Age: 64
End: 2024-07-29
Payer: COMMERCIAL

## 2024-07-29 VITALS
SYSTOLIC BLOOD PRESSURE: 147 MMHG | TEMPERATURE: 96.8 F | RESPIRATION RATE: 18 BRPM | WEIGHT: 252.87 LBS | DIASTOLIC BLOOD PRESSURE: 78 MMHG | BODY MASS INDEX: 38.45 KG/M2 | HEART RATE: 66 BPM | OXYGEN SATURATION: 96 %

## 2024-07-29 DIAGNOSIS — D61.818 PANCYTOPENIA (MULTI): ICD-10-CM

## 2024-07-29 DIAGNOSIS — D69.6 THROMBOCYTOPENIA (CMS-HCC): ICD-10-CM

## 2024-07-29 PROCEDURE — 4010F ACE/ARB THERAPY RXD/TAKEN: CPT | Performed by: INTERNAL MEDICINE

## 2024-07-29 PROCEDURE — 99214 OFFICE O/P EST MOD 30 MIN: CPT | Performed by: INTERNAL MEDICINE

## 2024-07-29 PROCEDURE — 1126F AMNT PAIN NOTED NONE PRSNT: CPT | Performed by: INTERNAL MEDICINE

## 2024-07-29 PROCEDURE — 1036F TOBACCO NON-USER: CPT | Performed by: INTERNAL MEDICINE

## 2024-07-29 PROCEDURE — 3044F HG A1C LEVEL LT 7.0%: CPT | Performed by: INTERNAL MEDICINE

## 2024-07-29 PROCEDURE — 3077F SYST BP >= 140 MM HG: CPT | Performed by: INTERNAL MEDICINE

## 2024-07-29 PROCEDURE — 3078F DIAST BP <80 MM HG: CPT | Performed by: INTERNAL MEDICINE

## 2024-07-29 PROCEDURE — 1159F MED LIST DOCD IN RCRD: CPT | Performed by: INTERNAL MEDICINE

## 2024-07-29 ASSESSMENT — COLUMBIA-SUICIDE SEVERITY RATING SCALE - C-SSRS
1. IN THE PAST MONTH, HAVE YOU WISHED YOU WERE DEAD OR WISHED YOU COULD GO TO SLEEP AND NOT WAKE UP?: NO
6. HAVE YOU EVER DONE ANYTHING, STARTED TO DO ANYTHING, OR PREPARED TO DO ANYTHING TO END YOUR LIFE?: NO
2. HAVE YOU ACTUALLY HAD ANY THOUGHTS OF KILLING YOURSELF?: NO

## 2024-07-29 ASSESSMENT — PAIN SCALES - GENERAL: PAINLEVEL: 0-NO PAIN

## 2024-07-29 NOTE — PROGRESS NOTES
"Treatment Synopsis:    myelodysplastic syndrome - unclassifiableb:   Diagnosis:   Presented to Dr. Ovalle for pogressive, mild thrombocytopenia.  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     Treatment:   None to date         History of Present Illness:      ID Statement:    DIPIKA JOHNSON is a 63 year old Male        Chief Complaint: \"I am here for my MDS\"   Interval History:    The patient was referred to me by Dr. Garrison for further evaluation and management of anemia with myelodysplastic syndrome on 08/04/2021.      The patient is a 61 year old with a past medical history significant for prostate cancer, cerebral aneurysm, CAD,  HTN, HLD, COPD/asthma, fluid retention, diverticulosis, umbilical hernia, arthritis and low back pain. The patient has a history of MDS  and has been diagnosed in the past. He has been monitoring his CBC with his primary care physician, and a recent change in his WBC and slight anemia has prompted a re-referral to hematology - where we completed a repeat bone marrow biopsy that continued  to show low grade disease, with the diagnosis of myelodysplastic syndrome due to history of Damian t(1;7) leading to equivalent loss of 7q.  The patient was being treated at [facility] but has moved to the area and is choosing to be treated here at [place].      At interview on 08/04/2021 the patient narrated his past medical history. The patient is asymptomatic and denies  any history of shortness of breath at rest, nausea, vomiting, hematemesis, hematuria.     The patient had come for a follow up on 5/1/2023 regarding his history of multifactorial anemia 7Q-patient underwent aortic valve replacement in March 2023 and received blood transfusions.  " Initially felt better but is now beginning to feel weak and tired.   The patient reports easy fatigability. She is anxious to review the result of the tests performed.      The patient and his wife had come for follow-up on June 5, 2023 regarding history of multifactorial anemia, myelodysplastic syndrome with 7 q. minus.  The patient underwent aortic valve replacement in March 2023 and received blood transfusions.  Initially  felt better but is now beginning to feel weak and tired requesting assistance.     The patient and his wife had come for follow-up on January 29, 2024 regarding history of multifactorial anemia, myelodysplastic syndrome with 7 q. - and iron deficiency component.  The patient underwent aortic valve replacement in March 2023 and received  blood transfusion.  Also had iron deficiency component and received intravenous Feraheme as well.  He is beginning to feel better now.    The patient and his wife had come for follow-up on July 29, 2024 regarding history of multifactorial anemia including iron deficiency component myelodysplastic syndrome 7 q. minus.  The patient was placed on oral iron replacement therapy after surgery for aortic valve placement.  Approximately 6 months ago the patient's hemoglobin was 9 g/dL.  His hemoglobin has slowly improved up to 13.4 g/dL in July2024.  Oral iron replacement was held.  The patient is beginning to feel better.     Past Medical History:   1. Anemia with MDS  2. Prostate cancer  3. Cerebral aneurysm  4. CAD   5. HTN   6. HLD  7. COPD/asthma  8. Fluid retention   9. Diverticulosis   10. Umbilical hernia   11. Arthritis   12. Low back pain      Past Surgical History:   1. Cerebral aneurysm repair   2. Stent placement   3. Brachytherapy   4.  Aortic valve replacement in March 2023.  The patient received 2 units of packed red blood cell transfusions.  Family Medical History:   1. Reviewed but not relevant.      Last colonoscopy was a few years ago.                               Review of Systems:   ·  System Review All other systems have been reviewed and are negative for complaint.            Allergies and Intolerances:       Intolerances:         ciprofloxacin: Drug, Unknown, Active     Outpatient Medication Profile:  * Patient Currently Takes Medications as of 31-Jul-2023 10:49 documented in Structured Notes         losartan 100 mg oral tablet : Last Dose Taken:  , 1 tab(s) orally once a day         atorvastatin 20 mg oral tablet: Last Dose Taken:  , 1 tab(s) orally once  a day         oxyCODONE-acetaminophen 7.5 mg-325 mg oral tablet: Last Dose Taken:   , 1 tab(s) orally 2 times a day, As Needed         albuterol 2.5 mg/3 mL (0.083%) inhalation solution: Last Dose Taken:   , 3 milliliter(s) inhaled every 4-6 hours, As Needed         albuterol 90 mcg/inh inhalation aerosol: Last Dose Taken:  , 2 puff(s)  inhaled every 4-6 hours, As Needed         spironolactone 25 mg oral tablet: Last Dose Taken:  , 1 tab(s) orally  once a day         aspirin 81 mg oral delayed release tablet: Last Dose Taken:  , 1 tab(s)  orally once a day         Multiple Vitamins with Minerals oral tablet: Last Dose Taken:  , 1 tab(s)  orally once a day         fluticasone/umeclidinium/vilanterol 100 mcg-62.5 mcg-25 mcg/inh inhalation powder : Last Dose Taken:  , 1 puff(s) inhaled once a day         torsemide 20 mg oral tablet: Last Dose Taken:  , 1 tab(s) orally once  a day         Trelegy Ellipta 200 mcg-62.5 mcg-25 mcg/inh inhalation powder: Last Dose  Taken:  , 1 puff(s) inhaled once a day             Medical History:         Severe calcific aortic valve stenosis: ICD-10: I35.0, Status:  Active         Status post right and left heart catheterization: ICD-10:  Z98.890, Status: Active         Bacteremia: ICD-10: R78.81, Status: Active         MDS (myelodysplastic syndrome): ICD-10: D46.9, Status: Active         History of cerebral aneurysm repair: ICD-10: Z98.890, Status:  Active, Description:  coiling for thrombosed basilar tip aneurysm  9/12/17         Personal history of prostate cancer: ICD-10: Z85.46, Status:  Active, Description: 2014         Chronic low back pain: ICD-10: M54.5, Status: Active         Arthritis: ICD-10: M19.90, Status: Active, Description: spine         Umbilical hernia without obstruction or gangrene: ICD-10: K42.9,  Status: Active         History of diverticulosis: ICD-10: Z87.19, Status: Active,  Description: per CT         Myelodysplasia: ICD-10: Q06.9, Status: Active       Surg History:         S/P TAVR (transcatheter aortic valve replacement): ICD-10:  Z95.2, Status: Active         Dyspnea: ICD-10: R06.00, Status: Active         Coronary artery disease: ICD-10: I25.10, Status: Active         Elevated coronary artery calcium score: ICD-10: R93.1, Status:  Active         History of surgery for cerebral aneurysm: ICD-10: Z98.890,  Status: Active, Description: coiling for thrombosed basilar tip aneurysm  9/12/17         History of brachytherapy: ICD-10: Z92.3, Status: Active, Description:  prostate ~2014     Family History: No Family History items are recorded  in the problem list.      Social History:   Social Substance History:  ·  Social History denies smoking, alcohol and drug use   ·  Smoking Status never smoker (1)   ·  Tobacco Use denies   ·  Alcohol Use denies   ·  Drug Use denies            Vitals and Measurements:   Vitals: Temp: 36.4  HR: 71  RR: 18  BP: 139/63  SPO2%:   91   Measurements: HT(cm): 173.5  WT(kg): 108.8  BSA:  2.28  BMI:  36.1      Physical Exam:      Constitutional: overwt middle aged man, no acute  distress   Eyes: clear sclera, conjunctival normal, pupils equal   ENMT: mucous membranes moist, no apparent injury,  no lesions seen   Head/Neck: Neck supple, no apparent injury, thyroid  without mass or tenderness, No JVD, trachea midline, no bruits   Respiratory/Thorax: good chest expansion, hollow  to percussion, normal tactile fremitous   Cardiovascular:  Regular, rate and rhythm, no murmurs,  +2 radial and dorsalis pedis pulses.  He has trace edema bilaterally   Gastrointestinal: Nondistended, soft, non-tender,  no rebound tenderness or guarding.  No hepatosplenomegaly on careful exam, but this exam is limited by body habitus   Genitourinary: No Discharge, vesicles or other abnormalities   Musculoskeletal: ROM intact, no joint swelling, normal  strength   Extremities: normal extremities, see cardiat   Neurological: alert and oriented x3, intact senses,  motor, response and reflexes, normal strength   Lymphatic: No significant cervical, supraclavicular,  axillary lymphadenopathy   Psychological: Appropriate mood and behavior   Skin: Warm and dry, no lesions, no rashes         Lab Results:           Assessment and Plan:       1. Anemia with MDS     The patient was referred to me by Dr. Garrison for further evaluation and management of anemia with myelodysplastic syndrome on 08/04/2021.      The patient is a 61 year old with a past medical history significant for prostate cancer, cerebral aneurysm, CAD,  HTN, HLD, COPD/asthma, fluid retention, diverticulosis, umbilical hernia, arthritis and low back pain. The patient has a history of MDS  and has been diagnosed in the past. He has been monitoring his CBC with his primary care physician, and a recent change in his WBC and slight anemia has prompted a re-referral to hematology - where we completed a repeat bone marrow biopsy that continued  to show low grade disease, with the diagnosis of myelodysplastic syndrome due to history of Damian t(1;7) leading to equivalent loss of 7q.  The patient was being treated at [facility] but has moved to the area and is choosing to be treated here at [place].  Physical exam was within normal limits. I reviewed the lab data with the patient. CBC obtained on 07/23/2021 revealed WBC 3.5, HGB 9.3, HCT 32.5, , Neut 1.15. I had a detailed discussion with the patient and explained to him  the pathophysiology  of myelodysplastic syndrome. Given that he has had this syndrome for a few years, the patient is well versed on his condition and understanding of the consequences and treatment for MDS. I feel that it would be prudent to rule out any other cause of the  anemia such as hemolysis vs others. I recommended baseline studies and  US of the abdomen. Return in 1 week.      The patient had come for a follow up on 03/29/2022 regarding his history of multifactorial anemia M7Q-. Physical exam was within normal limits. I reviewed th lab data with the patient. CBC obtained on 03/25/2022 revealed WBC 3.7, HGB 13.1, HCT 39.8, PLT  123, Neut 1.66.   Iron saturation at 7% and ferritin at 17 ng/ml suggesting ELAINA component. Oral iron has been held at this time. Reassess in 3 months.      The patient and his wife had come for follow-up of anemia on May 1, 2023, regarding history of myelodysplastic syndrome.  She underwent aortic valve replacement and received 2 units of packed red blood cell transfusion in March 2023.  Initially did very  well but is now beginning to feel weak and tired.  I have recommended Ferrex 150 mg p.o. half an hour after food daily.  I explained to the patient and family that even after iron replacement therapy if he continues anemic would recommend initiating Procrit  the patient and family are agreeable will now return in 4 weeks.     The patient had come for follow-up on June 5, 2023 regarding history of multifactorial anemia including myelodysplastic syndrome 7 q. minus.  The patient underwent aortic valve replacement in March 2023 did receive 2 units of packed red blood cell transfusions.   Initially felt better but is now beginning to feel weak and tired.  Further evaluation revealed hemoglobin down to 9.6 g/dL and iron saturation down to 6%.  Most likely source of iron deficiency is blood loss during surgery.  I have recommended intravenous  Feraheme 510 mg/week x 2 weeks effect side  effects explained the patient understood appreciated all the details provided and was grateful.  Return in 8 weeks.  Will consider Procrit if anemia persists.      The patient and his wife had come for follow-up on January 29, 2024 regarding history of multifactorial anemia including iron deficiency component myelodysplastic syndrome 7 q. minus.  The patient underwent aortic valve replacement in March 2023 subsequently  hemoglobin decreased and iron saturation was down to 9%.  The patient was given a benefit of doubt and received intravenous Feraheme.  Feels better but is not back to baseline.  I have recommended reevaluation of anemia in 3 months and reassess.  Keeping  in mind that the patient has underlying myelodysplastic syndrome.  CBC on January 19, 2024 revealed hemoglobin 11.9 g/dL.  Iron saturation is 10%.  I have given him a benefit of doubt and recommended ferrous sulfate 325 mg p.o. half an hour after food daily.  The patient declined 3 times a day.  The patient to discontinue iron after 1 more month of therapy.  In the past GI evaluation did not reveal obvious source of bleeding.  Prescription sent.  Return in 3 months.  The patient and his wife understood appreciated all the  details provided and were grateful.    The patient and his wife had come for follow-up on July 29, 2024 regarding history of multifactorial anemia including iron deficiency component myelodysplastic syndrome 7 q. minus.  The patient was placed on oral iron replacement therapy after surgery for aortic valve placement.  Approximately 6 months ago the patient's hemoglobin was 9 g/dL.  His hemoglobin has slowly improved up to 11.9 g/dL in January 2024.  Oral iron replacement was held.  The patient is beginning to feel better.  CBC today revealed hemoglobin 13.4 g/dL  I had a long discussion with the patient and his wife explained to them about pathophysiology of myelodysplastic syndrome and elevation iron deficiency component.  The  patient does not want to take iron replacement therapy for now.  Requested reassessment in 6 months.     2. Prostate cancer  - Followed clinically by PCP, oncology and urology services   - Torsemide 10 mg PO QD  - Lasix 20 mg PO QD      3. Cerebral aneurysm  - S/p cerebral aneurysm repair   - Followed clinically by cardiovascular and neurology services.   -Aspirin 81 mg PO QD      4. CAD   - Followed clinically by PCP, cardiology and cardiovascular services.  Aspirin 81 mg PO QD      5. HTN    - Followed clinically by PCP, cardiology.   - Losartan 100 mg PO QD      6. HLD  - Atorvastatin 20 mg PO QD     7. COPD/asthma  - Ventolin HFA 90 mcg/inh 2 puff inhaled 4 times day     8. Fluid retention   - Lasix 20 mg PO QD   - Torsemide 10 mg PO QD      9. Diverticulosis   - Followed clinically by PCP and GI services.      10. Umbilical hernia  - Followed clinically by PCP and GI services.      11. Arthritis      12. Low back pain   -Oxycodone- acetaminophen 7.5-325 mg PO BID

## 2024-08-12 DIAGNOSIS — I51.89 DIASTOLIC DYSFUNCTION: ICD-10-CM

## 2024-08-12 RX ORDER — SPIRONOLACTONE 25 MG/1
25 TABLET ORAL DAILY
Qty: 90 TABLET | Refills: 3 | Status: SHIPPED | OUTPATIENT
Start: 2024-08-12

## 2024-08-22 DIAGNOSIS — M54.50 CHRONIC BILATERAL LOW BACK PAIN WITHOUT SCIATICA: Chronic | ICD-10-CM

## 2024-08-22 DIAGNOSIS — G89.4 CHRONIC PAIN SYNDROME: ICD-10-CM

## 2024-08-22 DIAGNOSIS — G89.29 CHRONIC BILATERAL LOW BACK PAIN WITHOUT SCIATICA: Chronic | ICD-10-CM

## 2024-08-22 RX ORDER — HYDROCODONE BITARTRATE AND ACETAMINOPHEN 5; 325 MG/1; MG/1
2 TABLET ORAL 3 TIMES DAILY PRN
Qty: 180 TABLET | Refills: 0 | Status: SHIPPED | OUTPATIENT
Start: 2024-08-22 | End: 2024-09-21

## 2024-08-29 DIAGNOSIS — R39.15 URGENCY OF URINATION: ICD-10-CM

## 2024-08-29 DIAGNOSIS — R35.0 URINARY FREQUENCY: Chronic | ICD-10-CM

## 2024-08-29 RX ORDER — FINASTERIDE 5 MG/1
5 TABLET, FILM COATED ORAL DAILY
Qty: 30 TABLET | Refills: 1 | Status: SHIPPED | OUTPATIENT
Start: 2024-08-29 | End: 2024-10-28

## 2024-09-24 ENCOUNTER — APPOINTMENT (OUTPATIENT)
Dept: PRIMARY CARE | Facility: CLINIC | Age: 64
End: 2024-09-24
Payer: COMMERCIAL

## 2024-09-24 VITALS
DIASTOLIC BLOOD PRESSURE: 64 MMHG | BODY MASS INDEX: 39.25 KG/M2 | OXYGEN SATURATION: 93 % | SYSTOLIC BLOOD PRESSURE: 130 MMHG | HEART RATE: 78 BPM | WEIGHT: 259 LBS | HEIGHT: 68 IN

## 2024-09-24 DIAGNOSIS — Z95.2 S/P TAVR (TRANSCATHETER AORTIC VALVE REPLACEMENT): ICD-10-CM

## 2024-09-24 DIAGNOSIS — Z51.81 MEDICATION MONITORING ENCOUNTER: ICD-10-CM

## 2024-09-24 DIAGNOSIS — M54.2 NECK PAIN, CHRONIC: Chronic | ICD-10-CM

## 2024-09-24 DIAGNOSIS — G89.29 NECK PAIN, CHRONIC: Chronic | ICD-10-CM

## 2024-09-24 DIAGNOSIS — Z23 ENCOUNTER FOR IMMUNIZATION: ICD-10-CM

## 2024-09-24 DIAGNOSIS — R35.0 URINARY FREQUENCY: Chronic | ICD-10-CM

## 2024-09-24 DIAGNOSIS — M54.50 CHRONIC BILATERAL LOW BACK PAIN WITHOUT SCIATICA: Primary | ICD-10-CM

## 2024-09-24 DIAGNOSIS — D46.9 MYELODYSPLASTIC SYNDROME, UNSPECIFIED (MULTI): Chronic | ICD-10-CM

## 2024-09-24 DIAGNOSIS — G89.4 CHRONIC PAIN SYNDROME: ICD-10-CM

## 2024-09-24 DIAGNOSIS — G89.29 CHRONIC BILATERAL LOW BACK PAIN WITHOUT SCIATICA: Primary | ICD-10-CM

## 2024-09-24 LAB
AMPHETAMINES UR QL SCN: NORMAL
BARBITURATES UR QL SCN: NORMAL
BZE UR QL SCN: NORMAL
CANNABINOIDS UR QL SCN: NORMAL
CREAT UR-MCNC: 115.9 MG/DL (ref 20–370)
PCP UR QL SCN: NORMAL

## 2024-09-24 PROCEDURE — 3044F HG A1C LEVEL LT 7.0%: CPT | Performed by: STUDENT IN AN ORGANIZED HEALTH CARE EDUCATION/TRAINING PROGRAM

## 2024-09-24 PROCEDURE — 80358 DRUG SCREENING METHADONE: CPT

## 2024-09-24 PROCEDURE — 80373 DRUG SCREENING TRAMADOL: CPT

## 2024-09-24 PROCEDURE — 80307 DRUG TEST PRSMV CHEM ANLYZR: CPT

## 2024-09-24 PROCEDURE — 3075F SYST BP GE 130 - 139MM HG: CPT | Performed by: STUDENT IN AN ORGANIZED HEALTH CARE EDUCATION/TRAINING PROGRAM

## 2024-09-24 PROCEDURE — 99214 OFFICE O/P EST MOD 30 MIN: CPT | Performed by: STUDENT IN AN ORGANIZED HEALTH CARE EDUCATION/TRAINING PROGRAM

## 2024-09-24 PROCEDURE — 90471 IMMUNIZATION ADMIN: CPT | Performed by: STUDENT IN AN ORGANIZED HEALTH CARE EDUCATION/TRAINING PROGRAM

## 2024-09-24 PROCEDURE — 90662 IIV NO PRSV INCREASED AG IM: CPT | Performed by: STUDENT IN AN ORGANIZED HEALTH CARE EDUCATION/TRAINING PROGRAM

## 2024-09-24 PROCEDURE — 3008F BODY MASS INDEX DOCD: CPT | Performed by: STUDENT IN AN ORGANIZED HEALTH CARE EDUCATION/TRAINING PROGRAM

## 2024-09-24 PROCEDURE — 80368 SEDATIVE HYPNOTICS: CPT

## 2024-09-24 PROCEDURE — 3078F DIAST BP <80 MM HG: CPT | Performed by: STUDENT IN AN ORGANIZED HEALTH CARE EDUCATION/TRAINING PROGRAM

## 2024-09-24 PROCEDURE — 80354 DRUG SCREENING FENTANYL: CPT

## 2024-09-24 PROCEDURE — 1159F MED LIST DOCD IN RCRD: CPT | Performed by: STUDENT IN AN ORGANIZED HEALTH CARE EDUCATION/TRAINING PROGRAM

## 2024-09-24 PROCEDURE — 1160F RVW MEDS BY RX/DR IN RCRD: CPT | Performed by: STUDENT IN AN ORGANIZED HEALTH CARE EDUCATION/TRAINING PROGRAM

## 2024-09-24 PROCEDURE — 1036F TOBACCO NON-USER: CPT | Performed by: STUDENT IN AN ORGANIZED HEALTH CARE EDUCATION/TRAINING PROGRAM

## 2024-09-24 PROCEDURE — 80365 DRUG SCREENING OXYCODONE: CPT

## 2024-09-24 PROCEDURE — 80361 OPIATES 1 OR MORE: CPT

## 2024-09-24 PROCEDURE — 80346 BENZODIAZEPINES1-12: CPT

## 2024-09-24 PROCEDURE — 82570 ASSAY OF URINE CREATININE: CPT

## 2024-09-24 PROCEDURE — 4010F ACE/ARB THERAPY RXD/TAKEN: CPT | Performed by: STUDENT IN AN ORGANIZED HEALTH CARE EDUCATION/TRAINING PROGRAM

## 2024-09-24 RX ORDER — HYDROCODONE BITARTRATE AND ACETAMINOPHEN 5; 325 MG/1; MG/1
2 TABLET ORAL 3 TIMES DAILY PRN
Qty: 180 TABLET | Refills: 0 | Status: SHIPPED | OUTPATIENT
Start: 2024-09-24 | End: 2024-10-24

## 2024-09-24 ASSESSMENT — PATIENT HEALTH QUESTIONNAIRE - PHQ9
2. FEELING DOWN, DEPRESSED OR HOPELESS: NOT AT ALL
SUM OF ALL RESPONSES TO PHQ9 QUESTIONS 1 AND 2: 0
1. LITTLE INTEREST OR PLEASURE IN DOING THINGS: NOT AT ALL

## 2024-09-24 ASSESSMENT — ENCOUNTER SYMPTOMS
FREQUENCY: 1
HEADACHES: 1
ARTHRALGIAS: 1
CARDIOVASCULAR NEGATIVE: 1
BACK PAIN: 1
SLEEP DISTURBANCE: 1
GASTROINTESTINAL NEGATIVE: 1
FATIGUE: 1

## 2024-09-24 NOTE — PROGRESS NOTES
"Subjective   Patient ID: Jose Gaviria \"Brandon" is a 64 y.o. male who presents for Follow-up (FOLLOW UP /Medication follow up /Flu shot , yearly UDS/Finasteride did not help with prostate problems - is getting up every 1-1.5 hours to urinate through the night. ).  Frustrated with his urinary frequency. Hx of prostate cancer s/p radiation. Has tried solifenacin, oxybutynin, and most recently finasteride with no benefit.     Pain is well controlled on current regimen. No side effects. Bowels stable.     Neck has been getting worse in the last 2 years. Using heating blanket sometimes.     No changes in  strength or numbness. Not interested in additional imaging at this time.     CSA up to date. UDS due today.     Would like his flu shot today.     No other concerns today.         Review of Systems   Constitutional:  Positive for fatigue.   HENT: Negative.     Cardiovascular: Negative.    Gastrointestinal: Negative.    Genitourinary:  Positive for frequency and urgency.   Musculoskeletal:  Positive for arthralgias and back pain.   Neurological:  Positive for headaches.   Psychiatric/Behavioral:  Positive for sleep disturbance.    All other systems reviewed and are negative.      Objective   Physical Exam  Vitals reviewed.   Constitutional:       Appearance: Normal appearance.   HENT:      Head: Normocephalic.   Eyes:      Pupils: Pupils are equal, round, and reactive to light.   Cardiovascular:      Rate and Rhythm: Normal rate and regular rhythm.   Pulmonary:      Effort: Pulmonary effort is normal.      Breath sounds: Normal breath sounds.   Abdominal:      General: Abdomen is flat.      Palpations: Abdomen is soft.   Musculoskeletal:         General: Normal range of motion.   Skin:     General: Skin is warm and dry.   Neurological:      General: No focal deficit present.      Mental Status: He is alert.   Psychiatric:         Mood and Affect: Mood normal.         Behavior: Behavior normal.         Thought Content: " Thought content normal.         Judgment: Judgment normal.         Body mass index is 39.38 kg/m².      Current Outpatient Medications:     losartan (Cozaar) 100 mg tablet, TAKE 1 TABLET DAILY, Disp: 90 tablet, Rfl: 3    multivitamin capsule, Take 1 capsule by mouth once daily., Disp: , Rfl:     multivitamin with minerals (multivit-min-iron fum-folic ac) tablet, Take 1 tablet by mouth once daily., Disp: , Rfl:     spironolactone (Aldactone) 25 mg tablet, TAKE 1 TABLET DAILY, Disp: 90 tablet, Rfl: 3    tiotropium (Spiriva) 18 mcg inhalation capsule, Place 1 capsule (18 mcg) into inhaler and inhale once daily., Disp: , Rfl:     torsemide (Demadex) 20 mg tablet, Take 1 tablet (20 mg) by mouth 2 times a day., Disp: , Rfl:     Trelegy Ellipta 100-62.5-25 mcg blister with device, Inhale., Disp: , Rfl:     amoxicillin (Amoxil) 500 mg capsule, Take 4 caps (2000 mg) 30-60mins prior to your dental appointment, Disp: 4 capsule, Rfl: 5    aspirin 81 mg EC tablet, Take 1 tablet (81 mg) by mouth once daily., Disp: , Rfl:     atorvastatin (Lipitor) 20 mg tablet, TAKE 1 TABLET DAILY, Disp: 90 tablet, Rfl: 3    DULoxetine (Cymbalta) 20 mg DR capsule, Take 1 capsule (20 mg) by mouth once daily., Disp: , Rfl:     ferrous sulfate 325 (65 Fe) MG EC tablet, Take 1 tablet (325 mg) by mouth once daily. Ferrous sulfate 325 mg tablet, 1 tablet half an hour after food daily., Disp: 90 tablet, Rfl: 3    fluticasone (Flonase) 50 mcg/actuation nasal spray, Administer 2 sprays into each nostril once daily. (Patient not taking: Reported on 7/29/2024), Disp: 16 g, Rfl: 1    HYDROcodone-acetaminophen (Norco) 5-325 mg tablet, Take 2 tablets by mouth 3 times a day as needed for severe pain (7 - 10)., Disp: 180 tablet, Rfl: 0    naloxone (Narcan) 4 mg/0.1 mL nasal spray, Administer 1 mg into affected nostril(s) 1 time. may repeat in 1 to 2 hours if symptoms recur, Disp: , Rfl:       Assessment/Plan   Diagnoses and all orders for this visit:  Chronic  bilateral low back pain without sciatica  Comments:  stable on current regimen 10mg TID   CSA and UDS UTD  Orders:  -     Opiate/Opioid/Benzo Prescription Compliance  -     HYDROcodone-acetaminophen (Norco) 5-325 mg tablet; Take 2 tablets by mouth 3 times a day as needed for severe pain (7 - 10).  -     OOB Internal Tracking  Chronic pain syndrome  -     Opiate/Opioid/Benzo Prescription Compliance  -     HYDROcodone-acetaminophen (Norco) 5-325 mg tablet; Take 2 tablets by mouth 3 times a day as needed for severe pain (7 - 10).  -     OOB Internal Tracking  Encounter for immunization  -     Flu vaccine, trivalent, preservative free, HIGH-DOSE, age 65y+ (Fluzone)  Myelodysplastic syndrome, unspecified (Multi)  Comments:  stable  follows with hematology  Medication monitoring encounter  S/P TAVR (transcatheter aortic valve replacement)  Urinary frequency  Comments:  no change with multiple medications  Neck pain, chronic  Comments:  continue heat  consider cervical xray if worsening  no radicular symptoms    [facility] report reviewed for Jose Gaviria today and is consistent with prescribed therapy.  We weighed the risks and benefit of this therapy and will continue with the current plan    Follow up in 3 months       Li Cloud DO 09/24/24 4:49 PM

## 2024-09-30 LAB
1OH-MIDAZOLAM UR CFM-MCNC: <25 NG/ML
6MAM UR CFM-MCNC: <25 NG/ML
7AMINOCLONAZEPAM UR CFM-MCNC: <25 NG/ML
A-OH ALPRAZ UR CFM-MCNC: <25 NG/ML
ALPRAZ UR CFM-MCNC: <25 NG/ML
CHLORDIAZEP UR CFM-MCNC: <25 NG/ML
CLONAZEPAM UR CFM-MCNC: <25 NG/ML
CODEINE UR CFM-MCNC: <50 NG/ML
DIAZEPAM UR CFM-MCNC: <25 NG/ML
EDDP UR CFM-MCNC: <25 NG/ML
FENTANYL UR CFM-MCNC: <2.5 NG/ML
HYDROCODONE CTO UR CFM-MCNC: 2497 NG/ML
HYDROMORPHONE UR CFM-MCNC: 962 NG/ML
LORAZEPAM UR CFM-MCNC: <25 NG/ML
METHADONE UR CFM-MCNC: <25 NG/ML
MIDAZOLAM UR CFM-MCNC: <25 NG/ML
MORPHINE UR CFM-MCNC: <50 NG/ML
NORDIAZEPAM UR CFM-MCNC: <25 NG/ML
NORFENTANYL UR CFM-MCNC: <2.5 NG/ML
NORHYDROCODONE UR CFM-MCNC: >1000 NG/ML
NOROXYCODONE UR CFM-MCNC: <25 NG/ML
NORTRAMADOL UR-MCNC: <50 NG/ML
OXAZEPAM UR CFM-MCNC: <25 NG/ML
OXYCODONE UR CFM-MCNC: <25 NG/ML
OXYMORPHONE UR CFM-MCNC: <25 NG/ML
TEMAZEPAM UR CFM-MCNC: <25 NG/ML
TRAMADOL UR CFM-MCNC: <50 NG/ML
ZOLPIDEM UR CFM-MCNC: <25 NG/ML
ZOLPIDEM UR-MCNC: <25 NG/ML

## 2024-10-23 DIAGNOSIS — G89.29 CHRONIC BILATERAL LOW BACK PAIN WITHOUT SCIATICA: Chronic | ICD-10-CM

## 2024-10-23 DIAGNOSIS — M54.50 CHRONIC BILATERAL LOW BACK PAIN WITHOUT SCIATICA: Chronic | ICD-10-CM

## 2024-10-23 DIAGNOSIS — G89.4 CHRONIC PAIN SYNDROME: ICD-10-CM

## 2024-10-23 RX ORDER — HYDROCODONE BITARTRATE AND ACETAMINOPHEN 5; 325 MG/1; MG/1
2 TABLET ORAL 3 TIMES DAILY PRN
Qty: 180 TABLET | Refills: 0 | Status: SHIPPED | OUTPATIENT
Start: 2024-10-23 | End: 2024-11-22

## 2024-11-20 DIAGNOSIS — G89.29 CHRONIC BILATERAL LOW BACK PAIN WITHOUT SCIATICA: Chronic | ICD-10-CM

## 2024-11-20 DIAGNOSIS — M54.50 CHRONIC BILATERAL LOW BACK PAIN WITHOUT SCIATICA: Chronic | ICD-10-CM

## 2024-11-20 DIAGNOSIS — G89.4 CHRONIC PAIN SYNDROME: ICD-10-CM

## 2024-11-20 RX ORDER — HYDROCODONE BITARTRATE AND ACETAMINOPHEN 5; 325 MG/1; MG/1
2 TABLET ORAL 3 TIMES DAILY PRN
Qty: 180 TABLET | Refills: 0 | Status: SHIPPED | OUTPATIENT
Start: 2024-11-20 | End: 2024-12-20

## 2024-12-17 ENCOUNTER — APPOINTMENT (OUTPATIENT)
Dept: PRIMARY CARE | Facility: CLINIC | Age: 64
End: 2024-12-17
Payer: COMMERCIAL

## 2024-12-17 VITALS
HEIGHT: 68 IN | DIASTOLIC BLOOD PRESSURE: 72 MMHG | HEART RATE: 73 BPM | SYSTOLIC BLOOD PRESSURE: 130 MMHG | WEIGHT: 264 LBS | BODY MASS INDEX: 40.01 KG/M2 | OXYGEN SATURATION: 94 %

## 2024-12-17 DIAGNOSIS — R39.15 URGENCY OF URINATION: Chronic | ICD-10-CM

## 2024-12-17 DIAGNOSIS — J43.2 CENTRILOBULAR EMPHYSEMA (MULTI): Chronic | ICD-10-CM

## 2024-12-17 DIAGNOSIS — Z00.00 MEDICARE ANNUAL WELLNESS VISIT, SUBSEQUENT: Primary | ICD-10-CM

## 2024-12-17 DIAGNOSIS — G89.4 CHRONIC PAIN SYNDROME: ICD-10-CM

## 2024-12-17 DIAGNOSIS — R09.81 NASAL CONGESTION: ICD-10-CM

## 2024-12-17 DIAGNOSIS — G89.29 CHRONIC BILATERAL LOW BACK PAIN WITHOUT SCIATICA: Chronic | ICD-10-CM

## 2024-12-17 DIAGNOSIS — J01.10 ACUTE NON-RECURRENT FRONTAL SINUSITIS: Chronic | ICD-10-CM

## 2024-12-17 DIAGNOSIS — M54.50 CHRONIC BILATERAL LOW BACK PAIN WITHOUT SCIATICA: Chronic | ICD-10-CM

## 2024-12-17 DIAGNOSIS — Z23 ENCOUNTER FOR IMMUNIZATION: ICD-10-CM

## 2024-12-17 PROCEDURE — 3075F SYST BP GE 130 - 139MM HG: CPT | Performed by: STUDENT IN AN ORGANIZED HEALTH CARE EDUCATION/TRAINING PROGRAM

## 2024-12-17 PROCEDURE — 3060F POS MICROALBUMINURIA REV: CPT | Performed by: STUDENT IN AN ORGANIZED HEALTH CARE EDUCATION/TRAINING PROGRAM

## 2024-12-17 PROCEDURE — 1159F MED LIST DOCD IN RCRD: CPT | Performed by: STUDENT IN AN ORGANIZED HEALTH CARE EDUCATION/TRAINING PROGRAM

## 2024-12-17 PROCEDURE — 1124F ACP DISCUSS-NO DSCNMKR DOCD: CPT | Performed by: STUDENT IN AN ORGANIZED HEALTH CARE EDUCATION/TRAINING PROGRAM

## 2024-12-17 PROCEDURE — 4010F ACE/ARB THERAPY RXD/TAKEN: CPT | Performed by: STUDENT IN AN ORGANIZED HEALTH CARE EDUCATION/TRAINING PROGRAM

## 2024-12-17 PROCEDURE — 3078F DIAST BP <80 MM HG: CPT | Performed by: STUDENT IN AN ORGANIZED HEALTH CARE EDUCATION/TRAINING PROGRAM

## 2024-12-17 PROCEDURE — 1160F RVW MEDS BY RX/DR IN RCRD: CPT | Performed by: STUDENT IN AN ORGANIZED HEALTH CARE EDUCATION/TRAINING PROGRAM

## 2024-12-17 PROCEDURE — 1170F FXNL STATUS ASSESSED: CPT | Performed by: STUDENT IN AN ORGANIZED HEALTH CARE EDUCATION/TRAINING PROGRAM

## 2024-12-17 PROCEDURE — G0439 PPPS, SUBSEQ VISIT: HCPCS | Performed by: STUDENT IN AN ORGANIZED HEALTH CARE EDUCATION/TRAINING PROGRAM

## 2024-12-17 PROCEDURE — 90471 IMMUNIZATION ADMIN: CPT | Performed by: STUDENT IN AN ORGANIZED HEALTH CARE EDUCATION/TRAINING PROGRAM

## 2024-12-17 PROCEDURE — 1036F TOBACCO NON-USER: CPT | Performed by: STUDENT IN AN ORGANIZED HEALTH CARE EDUCATION/TRAINING PROGRAM

## 2024-12-17 PROCEDURE — 3044F HG A1C LEVEL LT 7.0%: CPT | Performed by: STUDENT IN AN ORGANIZED HEALTH CARE EDUCATION/TRAINING PROGRAM

## 2024-12-17 PROCEDURE — 3008F BODY MASS INDEX DOCD: CPT | Performed by: STUDENT IN AN ORGANIZED HEALTH CARE EDUCATION/TRAINING PROGRAM

## 2024-12-17 PROCEDURE — 99214 OFFICE O/P EST MOD 30 MIN: CPT | Performed by: STUDENT IN AN ORGANIZED HEALTH CARE EDUCATION/TRAINING PROGRAM

## 2024-12-17 PROCEDURE — 90677 PCV20 VACCINE IM: CPT | Performed by: STUDENT IN AN ORGANIZED HEALTH CARE EDUCATION/TRAINING PROGRAM

## 2024-12-17 RX ORDER — FLUTICASONE PROPIONATE 50 MCG
2 SPRAY, SUSPENSION (ML) NASAL DAILY
Qty: 16 G | Refills: 1 | Status: SHIPPED | OUTPATIENT
Start: 2024-12-17

## 2024-12-17 RX ORDER — AMOXICILLIN AND CLAVULANATE POTASSIUM 875; 125 MG/1; MG/1
875 TABLET, FILM COATED ORAL 2 TIMES DAILY
Qty: 20 TABLET | Refills: 0 | Status: SHIPPED | OUTPATIENT
Start: 2024-12-17 | End: 2024-12-27

## 2024-12-17 RX ORDER — HYDROCODONE BITARTRATE AND ACETAMINOPHEN 5; 325 MG/1; MG/1
2 TABLET ORAL 3 TIMES DAILY PRN
Qty: 180 TABLET | Refills: 0 | Status: SHIPPED | OUTPATIENT
Start: 2024-12-17 | End: 2025-01-16

## 2024-12-17 ASSESSMENT — PATIENT HEALTH QUESTIONNAIRE - PHQ9
1. LITTLE INTEREST OR PLEASURE IN DOING THINGS: NOT AT ALL
SUM OF ALL RESPONSES TO PHQ9 QUESTIONS 1 AND 2: 0
1. LITTLE INTEREST OR PLEASURE IN DOING THINGS: NOT AT ALL
SUM OF ALL RESPONSES TO PHQ9 QUESTIONS 1 AND 2: 0
10. IF YOU CHECKED OFF ANY PROBLEMS, HOW DIFFICULT HAVE THESE PROBLEMS MADE IT FOR YOU TO DO YOUR WORK, TAKE CARE OF THINGS AT HOME, OR GET ALONG WITH OTHER PEOPLE: NOT DIFFICULT AT ALL
2. FEELING DOWN, DEPRESSED OR HOPELESS: NOT AT ALL
2. FEELING DOWN, DEPRESSED OR HOPELESS: NOT AT ALL
1. LITTLE INTEREST OR PLEASURE IN DOING THINGS: NOT AT ALL
SUM OF ALL RESPONSES TO PHQ9 QUESTIONS 1 AND 2: 0
2. FEELING DOWN, DEPRESSED OR HOPELESS: NOT AT ALL

## 2024-12-17 ASSESSMENT — ENCOUNTER SYMPTOMS
CARDIOVASCULAR NEGATIVE: 1
GASTROINTESTINAL NEGATIVE: 1
ARTHRALGIAS: 1
FATIGUE: 1
FREQUENCY: 1
BACK PAIN: 1
SLEEP DISTURBANCE: 1

## 2024-12-17 ASSESSMENT — ACTIVITIES OF DAILY LIVING (ADL)
DOING_HOUSEWORK: INDEPENDENT
DRESSING: INDEPENDENT
GROCERY_SHOPPING: INDEPENDENT
BATHING: INDEPENDENT
TAKING_MEDICATION: INDEPENDENT
MANAGING_FINANCES: INDEPENDENT

## 2024-12-17 NOTE — PROGRESS NOTES
"Subjective   Patient ID: Jose Gaviria \"Brandon" is a 64 y.o. male who presents for Follow-up (Follow up visit /Patient thinks he has a sinus  infection - Sneezing, blowing out blood in his snot , dry  and burning  in nose /Running humidifier /Med refills /).  Pain doing much better since adding bedtime dose of pills. Csa updated on 6/2024. No side effects from this.     Bowels stable.     Nose is burning. Some blood when he blows his nose. Recently started using a humidifier a few days ago. Some sneezing. Gets symptoms like this 1-2x per year. Not currently using any nasal sprays.     Got a new exercise bike to start using. Hoping to lose weight.     Due for prevnar 20. Last one was 2019.     No other concerns today.             Review of Systems   Constitutional:  Positive for fatigue.   HENT: Negative.     Cardiovascular: Negative.    Gastrointestinal: Negative.    Genitourinary:  Positive for frequency and urgency.   Musculoskeletal:  Positive for arthralgias and back pain.   Psychiatric/Behavioral:  Positive for sleep disturbance.    All other systems reviewed and are negative.      Objective   Physical Exam  Vitals reviewed.   Constitutional:       Appearance: Normal appearance.   HENT:      Head: Normocephalic.   Eyes:      Pupils: Pupils are equal, round, and reactive to light.   Cardiovascular:      Rate and Rhythm: Normal rate and regular rhythm.   Pulmonary:      Effort: Pulmonary effort is normal.      Breath sounds: Normal breath sounds.   Abdominal:      General: Abdomen is flat.      Palpations: Abdomen is soft.   Musculoskeletal:         General: Normal range of motion.   Skin:     General: Skin is warm and dry.   Neurological:      General: No focal deficit present.      Mental Status: He is alert.   Psychiatric:         Mood and Affect: Mood normal.         Behavior: Behavior normal.         Thought Content: Thought content normal.         Judgment: Judgment normal.         Body mass index is 40.14 " kg/m².'      Current Outpatient Medications:     amoxicillin (Amoxil) 500 mg capsule, Take 4 caps (2000 mg) 30-60mins prior to your dental appointment, Disp: 4 capsule, Rfl: 5    aspirin 81 mg EC tablet, Take 1 tablet (81 mg) by mouth once daily., Disp: , Rfl:     atorvastatin (Lipitor) 20 mg tablet, TAKE 1 TABLET DAILY, Disp: 90 tablet, Rfl: 3    DULoxetine (Cymbalta) 20 mg DR capsule, Take 1 capsule (20 mg) by mouth once daily., Disp: , Rfl:     ferrous sulfate 325 (65 Fe) MG EC tablet, Take 1 tablet (325 mg) by mouth once daily. Ferrous sulfate 325 mg tablet, 1 tablet half an hour after food daily., Disp: 90 tablet, Rfl: 3    losartan (Cozaar) 100 mg tablet, TAKE 1 TABLET DAILY, Disp: 90 tablet, Rfl: 3    multivitamin capsule, Take 1 capsule by mouth once daily., Disp: , Rfl:     multivitamin with minerals (multivit-min-iron fum-folic ac) tablet, Take 1 tablet by mouth once daily., Disp: , Rfl:     naloxone (Narcan) 4 mg/0.1 mL nasal spray, Administer 1 mg into affected nostril(s) 1 time. may repeat in 1 to 2 hours if symptoms recur, Disp: , Rfl:     spironolactone (Aldactone) 25 mg tablet, TAKE 1 TABLET DAILY, Disp: 90 tablet, Rfl: 3    tiotropium (Spiriva) 18 mcg inhalation capsule, Place 1 capsule (18 mcg) into inhaler and inhale once daily., Disp: , Rfl:     torsemide (Demadex) 20 mg tablet, Take 1 tablet (20 mg) by mouth 2 times a day., Disp: , Rfl:     Trelegy Ellipta 100-62.5-25 mcg blister with device, Inhale., Disp: , Rfl:     amoxicillin-pot clavulanate (Augmentin) 875-125 mg tablet, Take 1 tablet (875 mg) by mouth 2 times a day for 10 days., Disp: 20 tablet, Rfl: 0    fluticasone (Flonase) 50 mcg/actuation nasal spray, Administer 2 sprays into each nostril once daily., Disp: 16 g, Rfl: 1    HYDROcodone-acetaminophen (Norco) 5-325 mg tablet, Take 2 tablets by mouth 3 times a day as needed for severe pain (7 - 10)., Disp: 180 tablet, Rfl: 0    Assessment/Plan   Diagnoses and all orders for this  visit:  Medicare annual wellness visit, subsequent  Comments:  UTD on screenings  prevnar 20 today  Chronic bilateral low back pain without sciatica  Comments:  stable on current regimen 10mg TID   CSA and UDS UTD  Orders:  -     HYDROcodone-acetaminophen (Norco) 5-325 mg tablet; Take 2 tablets by mouth 3 times a day as needed for severe pain (7 - 10).  Chronic pain syndrome  -     HYDROcodone-acetaminophen (Norco) 5-325 mg tablet; Take 2 tablets by mouth 3 times a day as needed for severe pain (7 - 10).  Nasal congestion  -     fluticasone (Flonase) 50 mcg/actuation nasal spray; Administer 2 sprays into each nostril once daily.  Centrilobular emphysema (Multi)  Comments:  stable on trelegy  Urgency of urination  Comments:  chronic  multiple meds tried, ineffective  Acute non-recurrent frontal sinusitis  Comments:  augmentin x 10 days  Orders:  -     amoxicillin-pot clavulanate (Augmentin) 875-125 mg tablet; Take 1 tablet (875 mg) by mouth 2 times a day for 10 days.  Encounter for immunization  -     Pneumococcal conjugate vaccine, 20-valent (PREVNAR 20)    [place] report reviewed for Jose Gaviria today and is consistent with prescribed therapy.  We weighed the risks and benefit of this therapy and will continue with the current plan    Follow up in 3 months       Li Cloud DO 12/17/24 4:18 PM

## 2024-12-18 PROBLEM — E66.813 CLASS 3 SEVERE OBESITY WITH BODY MASS INDEX (BMI) OF 40.0 TO 44.9 IN ADULT: Status: ACTIVE | Noted: 2018-08-03

## 2024-12-18 PROBLEM — I72.9 ANEURYSM (CMS-HCC): Status: RESOLVED | Noted: 2019-02-11 | Resolved: 2024-12-18

## 2024-12-18 PROBLEM — E66.01 CLASS 3 SEVERE OBESITY WITH BODY MASS INDEX (BMI) OF 40.0 TO 44.9 IN ADULT: Status: ACTIVE | Noted: 2018-08-03

## 2024-12-27 ENCOUNTER — APPOINTMENT (OUTPATIENT)
Dept: PRIMARY CARE | Facility: CLINIC | Age: 64
End: 2024-12-27
Payer: COMMERCIAL

## 2025-01-06 ENCOUNTER — APPOINTMENT (OUTPATIENT)
Dept: CARDIOLOGY | Facility: CLINIC | Age: 65
End: 2025-01-06
Payer: COMMERCIAL

## 2025-01-06 VITALS
HEART RATE: 81 BPM | DIASTOLIC BLOOD PRESSURE: 70 MMHG | OXYGEN SATURATION: 92 % | HEIGHT: 69 IN | SYSTOLIC BLOOD PRESSURE: 126 MMHG | BODY MASS INDEX: 39.43 KG/M2 | WEIGHT: 266.2 LBS

## 2025-01-06 DIAGNOSIS — I50.33 ACUTE ON CHRONIC HEART FAILURE WITH PRESERVED EJECTION FRACTION: ICD-10-CM

## 2025-01-06 DIAGNOSIS — Z95.2 S/P TAVR (TRANSCATHETER AORTIC VALVE REPLACEMENT): Primary | ICD-10-CM

## 2025-01-06 PROCEDURE — 1159F MED LIST DOCD IN RCRD: CPT | Performed by: PHYSICIAN ASSISTANT

## 2025-01-06 PROCEDURE — 1160F RVW MEDS BY RX/DR IN RCRD: CPT | Performed by: PHYSICIAN ASSISTANT

## 2025-01-06 PROCEDURE — 4010F ACE/ARB THERAPY RXD/TAKEN: CPT | Performed by: PHYSICIAN ASSISTANT

## 2025-01-06 PROCEDURE — 3008F BODY MASS INDEX DOCD: CPT | Performed by: PHYSICIAN ASSISTANT

## 2025-01-06 PROCEDURE — 1036F TOBACCO NON-USER: CPT | Performed by: PHYSICIAN ASSISTANT

## 2025-01-06 PROCEDURE — 99214 OFFICE O/P EST MOD 30 MIN: CPT | Performed by: PHYSICIAN ASSISTANT

## 2025-01-06 PROCEDURE — 3078F DIAST BP <80 MM HG: CPT | Performed by: PHYSICIAN ASSISTANT

## 2025-01-06 PROCEDURE — 3074F SYST BP LT 130 MM HG: CPT | Performed by: PHYSICIAN ASSISTANT

## 2025-01-06 NOTE — PROGRESS NOTES
"Chief Complaint:   Follow-up (6 month)     History Of Present Illness:    1/6/25  Patient denies chest pain, chest pressure, palpitations, dyspnea on exertion, shortness of breath at rest, diaphoresis, nausea/vomiting, back pain, headache, lightheadedness, dizziness, syncope or presyncopal episodes, active bleeding signs or symptoms, excessive weight gain, muscle or joint pain, claudication.    7/2/24  Crow Gaviria is a 64 y.o. male presenting s/p BRIANDA with residual dyspnea on minimal exertion.  Interestingly, most recent CT chest following BRIANDA revealed severe pulmonary emphysema.  Discussed with patient that this underlying condition in addition to pancytopenia is likely the paramount contributing factors to his persistent dyspnea.  Will provide referral to pulmonology for further evaluation and treatment.  Patient denies chest pain, chest pressure, palpitations, shortness of breath at rest, diaphoresis, nausea/vomiting, back pain, headache, lightheadedness, dizziness, syncope or presyncopal episodes, active bleeding signs or symptoms, excessive weight gain, muscle or joint pain, claudication.       Last Recorded Vitals:  Vitals:    01/06/25 0934   BP: 126/70   BP Location: Left arm   Patient Position: Sitting   BP Cuff Size: Large adult   Pulse: 81   SpO2: 92%   Weight: 121 kg (266 lb 3.2 oz)   Height: 1.753 m (5' 9\")       Past Medical History:  He has a past medical history of Encounter for immunization (09/26/2016), Personal history of other diseases of the musculoskeletal system and connective tissue (04/14/2014), and Unspecified open wound of unspecified finger without damage to nail, subsequent encounter (06/11/2020).    Past Surgical History:  He has a past surgical history that includes Other surgical history (01/28/2019); Other surgical history (01/28/2019); and Other surgical history (01/28/2019).      Social History:  He reports that he quit smoking about 7 years ago. His smoking use included " cigarettes. He has never used smokeless tobacco. No history on file for alcohol use and drug use.    Family History:  Family History   Problem Relation Name Age of Onset    Heart attack Mother      Heart attack Father          Allergies:  Ciprofloxacin    Outpatient Medications:  Current Outpatient Medications   Medication Instructions    amoxicillin (Amoxil) 500 mg capsule Take 4 caps (2000 mg) 30-60mins prior to your dental appointment    aspirin 81 mg EC tablet 1 tablet, Daily    atorvastatin (LIPITOR) 20 mg, oral, Daily    ferrous sulfate 325 mg, oral, Daily, Ferrous sulfate 325 mg tablet, 1 tablet half an hour after food daily.    fluticasone (Flonase) 50 mcg/actuation nasal spray 2 sprays, Each Nostril, Daily    HYDROcodone-acetaminophen (Norco) 5-325 mg tablet 2 tablets, oral, 3 times daily PRN    losartan (COZAAR) 100 mg, oral, Daily    multivitamin capsule 1 capsule, Daily    multivitamin with minerals (multivit-min-iron fum-folic ac) tablet 1 tablet, Daily    naloxone (NARCAN) 1 mg, Once    spironolactone (ALDACTONE) 25 mg, oral, Daily    tiotropium (Spiriva) 18 mcg inhalation capsule 31 capsules, inhalation, Daily RT    torsemide (Demadex) 20 mg tablet 1 tablet, 2 times daily       Physical Exam:  Constitutional: awake and alert, oriented ×3, no apparent distress  Skin: warm, dry, good turgor no obvious lesions  Eyes: pupils equal, round, reactive to light, conjunctiva pink and noninjected, no discharge  HENT: normocephalic and atraumatic, mucous membranes moist, trachea midline with no masses/goiter  Cardiovascular: S1/S2 regular, 1/6 systolic murmur no rubs/gallops, no carotid bruits, no JVD  Pulmonary: symmetrical chest expansion, lungs are clear to auscultation bilaterally, no wheezes/rales/rhonchi, normal effort  Abdomen: nontender, nondistended, active bowel sounds, no ascites  Extremities: no cyanosis, clubbing, no LE edema no lesions; palpable pedal pulses  Neurologic: cranial nerves II - XII  "grossly intact, stable gait, no tremor       Last Labs:  CBC -  Lab Results   Component Value Date    WBC 3.0 (L) 07/22/2024    HGB 13.4 (L) 07/22/2024    HCT 39.8 (L) 07/22/2024     (H) 07/22/2024     07/22/2024       CMP -  Lab Results   Component Value Date    CALCIUM 8.8 07/22/2024    PHOS CANCELED 03/30/2023    PROT 7.2 07/22/2024    ALBUMIN 3.8 07/22/2024    AST 19 07/22/2024    ALT 18 07/22/2024    ALKPHOS 97 07/22/2024    BILITOT 0.8 07/22/2024       LIPID PANEL -   Lab Results   Component Value Date    CHOL 107 07/23/2021    TRIG 103 07/23/2021    HDL 23.7 (A) 07/23/2021    CHHDL 4.5 07/23/2021    LDLF 63 07/23/2021    VLDL 21 07/23/2021       RENAL FUNCTION PANEL -   Lab Results   Component Value Date    GLUCOSE 118 (H) 07/22/2024     07/22/2024    K 4.2 07/22/2024     07/22/2024    CO2 26 07/22/2024    ANIONGAP 10 07/22/2024    BUN 17 07/22/2024    CREATININE 0.86 07/22/2024    GFRMALE >90 07/28/2023    CALCIUM 8.8 07/22/2024    PHOS CANCELED 03/30/2023    ALBUMIN 3.8 07/22/2024        Lab Results   Component Value Date     (H) 04/29/2020    HGBA1C 5.2 06/24/2024    HGBA1C 5.3 10/23/2023       Last Cardiology Tests:  ECG:  No results found for this or any previous visit from the past 1095 days.      Echo:  Transthoracic echo (TTE) complete 04/22/2024  1. Left ventricular systolic function is normal with a 55-60% estimated ejection fraction.   2. Spectral Doppler shows an impaired relaxation pattern of left ventricular diastolic filling.   3. Mildly elevated RVSP.   4. There is a well-seated TAVR valve in place. Peak gradient 34 mmHg mild aortic regurgitation.   5. Mild aortic valve regurgitation.   6. The ascending aorta is mildly dilated measuring 4 cm.    Ejection Fractions:  No results found for: \"EF\"    Cath:  No results found for this or any previous visit from the past 1095 days.      Stress Test:  No results found for this or any previous visit from the past 1095 " days.      Cardiac Imaging:  No results found for this or any previous visit from the past 1095 days.      Assessment/Plan   Problem List Items Addressed This Visit             ICD-10-CM       Cardiac and Vasculature    Acute on chronic heart failure with preserved ejection fraction I50.33    S/P TAVR (transcatheter aortic valve replacement) - Primary Z95.2    Relevant Orders    Transthoracic echo (TTE) complete         -S/P BRIANDA, normal LVEF on 2D echo with mild regurgitation of transcatheter aortic valve    -We will arrange for a 2D echocardiogram to assess LVEF and valvular function.    -F/U with me in 6 months      HO SingletonC

## 2025-01-21 ENCOUNTER — LAB (OUTPATIENT)
Dept: LAB | Facility: CLINIC | Age: 65
End: 2025-01-21
Payer: COMMERCIAL

## 2025-01-21 DIAGNOSIS — D61.818 PANCYTOPENIA: ICD-10-CM

## 2025-01-21 DIAGNOSIS — D69.6 THROMBOCYTOPENIA (CMS-HCC): ICD-10-CM

## 2025-01-21 LAB
ALBUMIN SERPL BCP-MCNC: 3.6 G/DL (ref 3.4–5)
ALP SERPL-CCNC: 87 U/L (ref 33–136)
ALT SERPL W P-5'-P-CCNC: 16 U/L (ref 10–52)
ANION GAP SERPL CALC-SCNC: 11 MMOL/L (ref 10–20)
AST SERPL W P-5'-P-CCNC: 18 U/L (ref 9–39)
BASOPHILS # BLD MANUAL: 0 X10*3/UL (ref 0–0.1)
BASOPHILS NFR BLD MANUAL: 0 %
BILIRUB SERPL-MCNC: 0.6 MG/DL (ref 0–1.2)
BUN SERPL-MCNC: 16 MG/DL (ref 6–23)
CALCIUM SERPL-MCNC: 8.6 MG/DL (ref 8.6–10.3)
CHLORIDE SERPL-SCNC: 106 MMOL/L (ref 98–107)
CO2 SERPL-SCNC: 25 MMOL/L (ref 21–32)
CREAT SERPL-MCNC: 0.98 MG/DL (ref 0.5–1.3)
EGFRCR SERPLBLD CKD-EPI 2021: 86 ML/MIN/1.73M*2
EOSINOPHIL # BLD MANUAL: 0 X10*3/UL (ref 0–0.7)
EOSINOPHIL NFR BLD MANUAL: 0 %
ERYTHROCYTE [DISTWIDTH] IN BLOOD BY AUTOMATED COUNT: 17.1 % (ref 11.5–14.5)
FERRITIN SERPL-MCNC: 115 NG/ML (ref 20–300)
GLUCOSE SERPL-MCNC: 106 MG/DL (ref 74–99)
HCT VFR BLD AUTO: 35.7 % (ref 41–52)
HGB BLD-MCNC: 11.6 G/DL (ref 13.5–17.5)
IMM GRANULOCYTES # BLD AUTO: 0.1 X10*3/UL (ref 0–0.7)
IMM GRANULOCYTES NFR BLD AUTO: 2.7 % (ref 0–0.9)
IRON SATN MFR SERPL: 15 % (ref 25–45)
IRON SERPL-MCNC: 52 UG/DL (ref 35–150)
LYMPHOCYTES # BLD MANUAL: 1.15 X10*3/UL (ref 1.2–4.8)
LYMPHOCYTES NFR BLD MANUAL: 31 %
MCH RBC QN AUTO: 35.8 PG (ref 26–34)
MCHC RBC AUTO-ENTMCNC: 32.5 G/DL (ref 32–36)
MCV RBC AUTO: 110 FL (ref 80–100)
MONOCYTES # BLD MANUAL: 0.63 X10*3/UL (ref 0.1–1)
MONOCYTES NFR BLD MANUAL: 17 %
NEUTROPHILS # BLD MANUAL: 1.89 X10*3/UL (ref 1.2–7.7)
NEUTS BAND # BLD MANUAL: 0.19 X10*3/UL (ref 0–0.7)
NEUTS BAND NFR BLD MANUAL: 5 %
NEUTS SEG # BLD MANUAL: 1.7 X10*3/UL (ref 1.2–7)
NEUTS SEG NFR BLD MANUAL: 46 %
NRBC BLD-RTO: 0 /100 WBCS (ref 0–0)
PLATELET # BLD AUTO: 226 X10*3/UL (ref 150–450)
POTASSIUM SERPL-SCNC: 4.3 MMOL/L (ref 3.5–5.3)
PROT SERPL-MCNC: 7.5 G/DL (ref 6.4–8.2)
RBC # BLD AUTO: 3.24 X10*6/UL (ref 4.5–5.9)
RBC MORPH BLD: ABNORMAL
SODIUM SERPL-SCNC: 138 MMOL/L (ref 136–145)
TIBC SERPL-MCNC: 357 UG/DL (ref 240–445)
TOTAL CELLS COUNTED BLD: 100
UIBC SERPL-MCNC: 305 UG/DL (ref 110–370)
VARIANT LYMPHS # BLD MANUAL: 0.04 X10*3/UL (ref 0–0.5)
VARIANT LYMPHS NFR BLD: 1 %
WBC # BLD AUTO: 3.7 X10*3/UL (ref 4.4–11.3)

## 2025-01-21 PROCEDURE — 80053 COMPREHEN METABOLIC PANEL: CPT

## 2025-01-21 PROCEDURE — 82728 ASSAY OF FERRITIN: CPT | Mod: PARLAB

## 2025-01-21 PROCEDURE — 85007 BL SMEAR W/DIFF WBC COUNT: CPT

## 2025-01-21 PROCEDURE — 85027 COMPLETE CBC AUTOMATED: CPT

## 2025-01-21 PROCEDURE — 83540 ASSAY OF IRON: CPT

## 2025-01-21 PROCEDURE — 36415 COLL VENOUS BLD VENIPUNCTURE: CPT

## 2025-01-22 DIAGNOSIS — G89.29 CHRONIC BILATERAL LOW BACK PAIN WITHOUT SCIATICA: Chronic | ICD-10-CM

## 2025-01-22 DIAGNOSIS — M54.50 CHRONIC BILATERAL LOW BACK PAIN WITHOUT SCIATICA: Chronic | ICD-10-CM

## 2025-01-22 DIAGNOSIS — G89.4 CHRONIC PAIN SYNDROME: ICD-10-CM

## 2025-01-22 RX ORDER — HYDROCODONE BITARTRATE AND ACETAMINOPHEN 5; 325 MG/1; MG/1
2 TABLET ORAL 3 TIMES DAILY PRN
Qty: 180 TABLET | Refills: 0 | Status: SHIPPED | OUTPATIENT
Start: 2025-01-22 | End: 2025-02-21

## 2025-01-24 DIAGNOSIS — E78.2 MIXED HYPERLIPIDEMIA: ICD-10-CM

## 2025-01-24 DIAGNOSIS — I10 PRIMARY HYPERTENSION: ICD-10-CM

## 2025-01-24 RX ORDER — LOSARTAN POTASSIUM 100 MG/1
100 TABLET ORAL DAILY
Qty: 90 TABLET | Refills: 3 | Status: SHIPPED | OUTPATIENT
Start: 2025-01-24

## 2025-01-24 RX ORDER — ATORVASTATIN CALCIUM 20 MG/1
20 TABLET, FILM COATED ORAL DAILY
Qty: 90 TABLET | Refills: 3 | Status: SHIPPED | OUTPATIENT
Start: 2025-01-24

## 2025-01-27 ENCOUNTER — OFFICE VISIT (OUTPATIENT)
Dept: HEMATOLOGY/ONCOLOGY | Facility: CLINIC | Age: 65
End: 2025-01-27
Payer: COMMERCIAL

## 2025-01-27 VITALS
DIASTOLIC BLOOD PRESSURE: 69 MMHG | BODY MASS INDEX: 38.81 KG/M2 | TEMPERATURE: 97.2 F | RESPIRATION RATE: 18 BRPM | WEIGHT: 262.79 LBS | OXYGEN SATURATION: 91 % | HEART RATE: 77 BPM | SYSTOLIC BLOOD PRESSURE: 144 MMHG

## 2025-01-27 DIAGNOSIS — D61.818 PANCYTOPENIA: ICD-10-CM

## 2025-01-27 DIAGNOSIS — D50.9 IRON DEFICIENCY ANEMIA, UNSPECIFIED IRON DEFICIENCY ANEMIA TYPE: ICD-10-CM

## 2025-01-27 DIAGNOSIS — D69.6 THROMBOCYTOPENIA (CMS-HCC): ICD-10-CM

## 2025-01-27 PROCEDURE — 3077F SYST BP >= 140 MM HG: CPT | Performed by: INTERNAL MEDICINE

## 2025-01-27 PROCEDURE — 1159F MED LIST DOCD IN RCRD: CPT | Performed by: INTERNAL MEDICINE

## 2025-01-27 PROCEDURE — 3078F DIAST BP <80 MM HG: CPT | Performed by: INTERNAL MEDICINE

## 2025-01-27 PROCEDURE — 99214 OFFICE O/P EST MOD 30 MIN: CPT | Performed by: INTERNAL MEDICINE

## 2025-01-27 PROCEDURE — 4010F ACE/ARB THERAPY RXD/TAKEN: CPT | Performed by: INTERNAL MEDICINE

## 2025-01-27 PROCEDURE — 1126F AMNT PAIN NOTED NONE PRSNT: CPT | Performed by: INTERNAL MEDICINE

## 2025-01-27 ASSESSMENT — PAIN SCALES - GENERAL: PAINLEVEL_OUTOF10: 0-NO PAIN

## 2025-01-27 NOTE — PROGRESS NOTES
"Treatment Synopsis:    myelodysplastic syndrome - unclassifiableb:   Diagnosis:   Presented to Dr. Ovalle for pogressive, mild thrombocytopenia.  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     Treatment:   None to date         History of Present Illness:      ID Statement:    DIPIKA JOHNSON is a 63 year old Male        Chief Complaint: \"I am here for my MDS\"   Interval History:    The patient was referred to me by Dr. Garrison for further evaluation and management of anemia with myelodysplastic syndrome on 08/04/2021.      The patient is a 61 year old with a past medical history significant for prostate cancer, cerebral aneurysm, CAD,  HTN, HLD, COPD/asthma, fluid retention, diverticulosis, umbilical hernia, arthritis and low back pain. The patient has a history of MDS  and has been diagnosed in the past. He has been monitoring his CBC with his primary care physician, and a recent change in his WBC and slight anemia has prompted a re-referral to hematology - where we completed a repeat bone marrow biopsy that continued  to show low grade disease, with the diagnosis of myelodysplastic syndrome due to history of Damian t(1;7) leading to equivalent loss of 7q.  The patient was being treated at [facility] but has moved to the area and is choosing to be treated here at [place].      At interview on 08/04/2021 the patient narrated his past medical history. The patient is asymptomatic and denies  any history of shortness of breath at rest, nausea, vomiting, hematemesis, hematuria.     The patient had come for a follow up on 5/1/2023 regarding his history of multifactorial anemia 7Q-patient underwent aortic valve replacement in March 2023 and received blood transfusions.  " Initially felt better but is now beginning to feel weak and tired.   The patient reports easy fatigability. She is anxious to review the result of the tests performed.      The patient and his wife had come for follow-up on June 5, 2023 regarding history of multifactorial anemia, myelodysplastic syndrome with 7 q. minus.  The patient underwent aortic valve replacement in March 2023 and received blood transfusions.  Initially  felt better but is now beginning to feel weak and tired requesting assistance.     The patient and his wife had come for follow-up on January 29, 2024 regarding history of multifactorial anemia, myelodysplastic syndrome with 7 q. - and iron deficiency component.  The patient underwent aortic valve replacement in March 2023 and received  blood transfusion.  Also had iron deficiency component and received intravenous Feraheme as well.  He is beginning to feel better now.    The patient and his wife had come for follow-up on January 27,2025 regarding history of multifactorial anemia including iron deficiency component myelodysplastic syndrome 7 q. minus.  The patient was placed on oral iron replacement therapy after surgery for aortic valve placement.  Approximately 6 months ago the patient's hemoglobin was 9 g/dL.  His hemoglobin has slowly improved up to 13.4 g/dL in July2024.  Oral iron replacement was held.  The patient is asymptomatic.     Past Medical History:   1. Anemia with MDS  2. Prostate cancer  3. Cerebral aneurysm  4. CAD   5. HTN   6. HLD  7. COPD/asthma  8. Fluid retention   9. Diverticulosis   10. Umbilical hernia   11. Arthritis   12. Low back pain      Past Surgical History:   1. Cerebral aneurysm repair   2. Stent placement   3. Brachytherapy   4.  Aortic valve replacement in March 2023.  The patient received 2 units of packed red blood cell transfusions.  Family Medical History:   1. Reviewed but not relevant.      Last colonoscopy was a few years ago.                               Review of Systems:   ·  System Review All other systems have been reviewed and are negative for complaint.            Allergies and Intolerances:       Intolerances:         ciprofloxacin: Drug, Unknown, Active     Outpatient Medication Profile:  * Patient Currently Takes Medications as of 31-Jul-2023 10:49 documented in Structured Notes         losartan 100 mg oral tablet : Last Dose Taken:  , 1 tab(s) orally once a day         atorvastatin 20 mg oral tablet: Last Dose Taken:  , 1 tab(s) orally once  a day         oxyCODONE-acetaminophen 7.5 mg-325 mg oral tablet: Last Dose Taken:   , 1 tab(s) orally 2 times a day, As Needed         albuterol 2.5 mg/3 mL (0.083%) inhalation solution: Last Dose Taken:   , 3 milliliter(s) inhaled every 4-6 hours, As Needed         albuterol 90 mcg/inh inhalation aerosol: Last Dose Taken:  , 2 puff(s)  inhaled every 4-6 hours, As Needed         spironolactone 25 mg oral tablet: Last Dose Taken:  , 1 tab(s) orally  once a day         aspirin 81 mg oral delayed release tablet: Last Dose Taken:  , 1 tab(s)  orally once a day         Multiple Vitamins with Minerals oral tablet: Last Dose Taken:  , 1 tab(s)  orally once a day         fluticasone/umeclidinium/vilanterol 100 mcg-62.5 mcg-25 mcg/inh inhalation powder : Last Dose Taken:  , 1 puff(s) inhaled once a day         torsemide 20 mg oral tablet: Last Dose Taken:  , 1 tab(s) orally once  a day         Trelegy Ellipta 200 mcg-62.5 mcg-25 mcg/inh inhalation powder: Last Dose  Taken:  , 1 puff(s) inhaled once a day             Medical History:         Severe calcific aortic valve stenosis: ICD-10: I35.0, Status:  Active         Status post right and left heart catheterization: ICD-10:  Z98.890, Status: Active         Bacteremia: ICD-10: R78.81, Status: Active         MDS (myelodysplastic syndrome): ICD-10: D46.9, Status: Active         History of cerebral aneurysm repair: ICD-10: Z98.890, Status:  Active, Description: coiling for  thrombosed basilar tip aneurysm  9/12/17         Personal history of prostate cancer: ICD-10: Z85.46, Status:  Active, Description: 2014         Chronic low back pain: ICD-10: M54.5, Status: Active         Arthritis: ICD-10: M19.90, Status: Active, Description: spine         Umbilical hernia without obstruction or gangrene: ICD-10: K42.9,  Status: Active         History of diverticulosis: ICD-10: Z87.19, Status: Active,  Description: per CT         Myelodysplasia: ICD-10: Q06.9, Status: Active       Surg History:         S/P TAVR (transcatheter aortic valve replacement): ICD-10:  Z95.2, Status: Active         Dyspnea: ICD-10: R06.00, Status: Active         Coronary artery disease: ICD-10: I25.10, Status: Active         Elevated coronary artery calcium score: ICD-10: R93.1, Status:  Active         History of surgery for cerebral aneurysm: ICD-10: Z98.890,  Status: Active, Description: coiling for thrombosed basilar tip aneurysm  9/12/17         History of brachytherapy: ICD-10: Z92.3, Status: Active, Description:  prostate ~2014     Family History: No Family History items are recorded  in the problem list.      Social History:   Social Substance History:  ·  Social History denies smoking, alcohol and drug use   ·  Smoking Status never smoker (1)   ·  Tobacco Use denies   ·  Alcohol Use denies   ·  Drug Use denies            Vitals and Measurements:   Vitals: Temp: 36.4  HR: 71  RR: 18  BP: 139/63  SPO2%:   91   Measurements: HT(cm): 173.5  WT(kg): 108.8  BSA:  2.28  BMI:  36.1      Physical Exam:      Constitutional: overwt middle aged man, no acute  distress   Eyes: clear sclera, conjunctival normal, pupils equal   ENMT: mucous membranes moist, no apparent injury,  no lesions seen   Head/Neck: Neck supple, no apparent injury, thyroid  without mass or tenderness, No JVD, trachea midline, no bruits   Respiratory/Thorax: good chest expansion, hollow  to percussion, normal tactile fremitous   Cardiovascular: Regular,  rate and rhythm, no murmurs,  +2 radial and dorsalis pedis pulses.  He has trace edema bilaterally   Gastrointestinal: Nondistended, soft, non-tender,  no rebound tenderness or guarding.  No hepatosplenomegaly on careful exam, but this exam is limited by body habitus   Genitourinary: No Discharge, vesicles or other abnormalities   Musculoskeletal: ROM intact, no joint swelling, normal  strength   Extremities: normal extremities, see cardiat   Neurological: alert and oriented x3, intact senses,  motor, response and reflexes, normal strength   Lymphatic: No significant cervical, supraclavicular,  axillary lymphadenopathy   Psychological: Appropriate mood and behavior   Skin: Warm and dry, no lesions, no rashes         Lab Results:           Assessment and Plan:       1. Anemia with MDS     The patient was referred to me by Dr. Garrison for further evaluation and management of anemia with myelodysplastic syndrome on 08/04/2021.      The patient is a 61 year old with a past medical history significant for prostate cancer, cerebral aneurysm, CAD,  HTN, HLD, COPD/asthma, fluid retention, diverticulosis, umbilical hernia, arthritis and low back pain. The patient has a history of MDS  and has been diagnosed in the past. He has been monitoring his CBC with his primary care physician, and a recent change in his WBC and slight anemia has prompted a re-referral to hematology - where we completed a repeat bone marrow biopsy that continued  to show low grade disease, with the diagnosis of myelodysplastic syndrome due to history of Damian t(1;7) leading to equivalent loss of 7q.  The patient was being treated at [facility] but has moved to the area and is choosing to be treated here at [facility].  Physical exam was within normal limits. I reviewed the lab data with the patient. CBC obtained on 07/23/2021 revealed WBC 3.5, HGB 9.3, HCT 32.5, , Neut 1.15. I had a detailed discussion with the patient and explained to him the  pathophysiology  of myelodysplastic syndrome. Given that he has had this syndrome for a few years, the patient is well versed on his condition and understanding of the consequences and treatment for MDS. I feel that it would be prudent to rule out any other cause of the  anemia such as hemolysis vs others. I recommended baseline studies and  US of the abdomen. Return in 1 week.      The patient had come for a follow up on 03/29/2022 regarding his history of multifactorial anemia M7Q-. Physical exam was within normal limits. I reviewed th lab data with the patient. CBC obtained on 03/25/2022 revealed WBC 3.7, HGB 13.1, HCT 39.8, PLT  123, Neut 1.66.   Iron saturation at 7% and ferritin at 17 ng/ml suggesting ELAINA component. Oral iron has been held at this time. Reassess in 3 months.      The patient and his wife had come for follow-up of anemia on May 1, 2023, regarding history of myelodysplastic syndrome.  She underwent aortic valve replacement and received 2 units of packed red blood cell transfusion in March 2023.  Initially did very  well but is now beginning to feel weak and tired.  I have recommended Ferrex 150 mg p.o. half an hour after food daily.  I explained to the patient and family that even after iron replacement therapy if he continues anemic would recommend initiating Procrit  the patient and family are agreeable will now return in 4 weeks.     The patient had come for follow-up on June 5, 2023 regarding history of multifactorial anemia including myelodysplastic syndrome 7 q. minus.  The patient underwent aortic valve replacement in March 2023 did receive 2 units of packed red blood cell transfusions.   Initially felt better but is now beginning to feel weak and tired.  Further evaluation revealed hemoglobin down to 9.6 g/dL and iron saturation down to 6%.  Most likely source of iron deficiency is blood loss during surgery.  I have recommended intravenous  Feraheme 510 mg/week x 2 weeks effect side  effects explained the patient understood appreciated all the details provided and was grateful.  Return in 8 weeks.  Will consider Procrit if anemia persists.      The patient and his wife had come for follow-up on January 29, 2024 regarding history of multifactorial anemia including iron deficiency component myelodysplastic syndrome 7 q. minus.  The patient underwent aortic valve replacement in March 2023 subsequently  hemoglobin decreased and iron saturation was down to 9%.  The patient was given a benefit of doubt and received intravenous Feraheme.  Feels better but is not back to baseline.  I have recommended reevaluation of anemia in 3 months and reassess.  Keeping  in mind that the patient has underlying myelodysplastic syndrome.  CBC on January 19, 2024 revealed hemoglobin 11.9 g/dL.  Iron saturation is 10%.  I have given him a benefit of doubt and recommended ferrous sulfate 325 mg p.o. half an hour after food daily.  The patient declined 3 times a day.  The patient to discontinue iron after 1 more month of therapy.  In the past GI evaluation did not reveal obvious source of bleeding.  Prescription sent.  Return in 3 months.  The patient and his wife understood appreciated all the  details provided and were grateful.    The patient and his wife had come for follow-up on July 29, 2024 regarding history of multifactorial anemia including iron deficiency component myelodysplastic syndrome 7 q. minus.  The patient was placed on oral iron replacement therapy after surgery for aortic valve placement.  Approximately 6 months ago the patient's hemoglobin was 9 g/dL.  His hemoglobin has slowly improved up to 11.9 g/dL in January 2024.  Oral iron replacement was held.  The patient is beginning to feel better.  CBC today revealed hemoglobin 13.4 g/dL  I had a long discussion with the patient and his wife explained to them about pathophysiology of myelodysplastic syndrome and elevation iron deficiency component.  The  patient does not want to take iron replacement therapy for now.  Requested reassessment in 6 months.    The patient and his wife had come for follow-up on January 27,2025 regarding history of multifactorial anemia including iron deficiency component myelodysplastic syndrome 7 q. minus.  The patient was placed on oral iron replacement therapy after surgery for aortic valve placement.  Approximately 6 months ago the patient's hemoglobin was 9 g/dL.  His hemoglobin has slowly improved up to 13.4 g/dL in July2024.  Oral iron replacement was held.  The patient is asymptomatic.  CBC on January 21, 2025 revealed WBC 3.7, hemoglobin 11.6 g/dL, platelets 2 26,000/mm³.  Serum iron 52 mcg/dL TIBC 357 mcg/dL saturation 15%.  Previous hemoglobin in July 2024 was 13.4 g/dL.  Recommend Ferrex/Iferex 150 mg p.o. half an hour after food daily.  Reassess in 6 months.     2. Prostate cancer  - Followed clinically by PCP, oncology and urology services   - Torsemide 10 mg PO QD  - Lasix 20 mg PO QD      3. Cerebral aneurysm  - S/p cerebral aneurysm repair   - Followed clinically by cardiovascular and neurology services.   -Aspirin 81 mg PO QD      4. CAD   - Followed clinically by PCP, cardiology and cardiovascular services.  Aspirin 81 mg PO QD      5. HTN    - Followed clinically by PCP, cardiology.   - Losartan 100 mg PO QD      6. HLD  - Atorvastatin 20 mg PO QD     7. COPD/asthma  - Ventolin HFA 90 mcg/inh 2 puff inhaled 4 times day     8. Fluid retention   - Lasix 20 mg PO QD   - Torsemide 10 mg PO QD      9. Diverticulosis   - Followed clinically by PCP and GI services.      10. Umbilical hernia  - Followed clinically by PCP and GI services.      11. Arthritis      12. Low back pain   -Oxycodone- acetaminophen 7.5-325 mg PO BID

## 2025-02-19 DIAGNOSIS — R09.81 NASAL CONGESTION: ICD-10-CM

## 2025-02-19 RX ORDER — FLUTICASONE PROPIONATE 50 MCG
2 SPRAY, SUSPENSION (ML) NASAL DAILY
Qty: 16 G | Refills: 1 | Status: SHIPPED | OUTPATIENT
Start: 2025-02-19

## 2025-02-20 DIAGNOSIS — G89.4 CHRONIC PAIN SYNDROME: ICD-10-CM

## 2025-02-20 DIAGNOSIS — G89.29 CHRONIC BILATERAL LOW BACK PAIN WITHOUT SCIATICA: Chronic | ICD-10-CM

## 2025-02-20 DIAGNOSIS — M54.50 CHRONIC BILATERAL LOW BACK PAIN WITHOUT SCIATICA: Chronic | ICD-10-CM

## 2025-02-20 RX ORDER — HYDROCODONE BITARTRATE AND ACETAMINOPHEN 5; 325 MG/1; MG/1
2 TABLET ORAL 3 TIMES DAILY PRN
Qty: 180 TABLET | Refills: 0 | Status: SHIPPED | OUTPATIENT
Start: 2025-02-20 | End: 2025-03-22

## 2025-02-25 ENCOUNTER — APPOINTMENT (OUTPATIENT)
Dept: GASTROENTEROLOGY | Facility: CLINIC | Age: 65
End: 2025-02-25
Payer: COMMERCIAL

## 2025-02-25 VITALS
DIASTOLIC BLOOD PRESSURE: 77 MMHG | WEIGHT: 264 LBS | HEART RATE: 71 BPM | HEIGHT: 68 IN | SYSTOLIC BLOOD PRESSURE: 141 MMHG | BODY MASS INDEX: 40.01 KG/M2

## 2025-02-25 DIAGNOSIS — D50.9 IRON DEFICIENCY ANEMIA, UNSPECIFIED IRON DEFICIENCY ANEMIA TYPE: ICD-10-CM

## 2025-02-25 DIAGNOSIS — K59.03 DRUG INDUCED CONSTIPATION: Primary | ICD-10-CM

## 2025-02-25 PROCEDURE — 1036F TOBACCO NON-USER: CPT | Performed by: STUDENT IN AN ORGANIZED HEALTH CARE EDUCATION/TRAINING PROGRAM

## 2025-02-25 PROCEDURE — 1159F MED LIST DOCD IN RCRD: CPT | Performed by: STUDENT IN AN ORGANIZED HEALTH CARE EDUCATION/TRAINING PROGRAM

## 2025-02-25 PROCEDURE — 4010F ACE/ARB THERAPY RXD/TAKEN: CPT | Performed by: STUDENT IN AN ORGANIZED HEALTH CARE EDUCATION/TRAINING PROGRAM

## 2025-02-25 PROCEDURE — 3077F SYST BP >= 140 MM HG: CPT | Performed by: STUDENT IN AN ORGANIZED HEALTH CARE EDUCATION/TRAINING PROGRAM

## 2025-02-25 PROCEDURE — 1160F RVW MEDS BY RX/DR IN RCRD: CPT | Performed by: STUDENT IN AN ORGANIZED HEALTH CARE EDUCATION/TRAINING PROGRAM

## 2025-02-25 PROCEDURE — 99204 OFFICE O/P NEW MOD 45 MIN: CPT | Performed by: STUDENT IN AN ORGANIZED HEALTH CARE EDUCATION/TRAINING PROGRAM

## 2025-02-25 PROCEDURE — 3008F BODY MASS INDEX DOCD: CPT | Performed by: STUDENT IN AN ORGANIZED HEALTH CARE EDUCATION/TRAINING PROGRAM

## 2025-02-25 PROCEDURE — 3078F DIAST BP <80 MM HG: CPT | Performed by: STUDENT IN AN ORGANIZED HEALTH CARE EDUCATION/TRAINING PROGRAM

## 2025-02-25 PROCEDURE — G2211 COMPLEX E/M VISIT ADD ON: HCPCS | Performed by: STUDENT IN AN ORGANIZED HEALTH CARE EDUCATION/TRAINING PROGRAM

## 2025-02-25 RX ORDER — SODIUM, POTASSIUM,MAG SULFATES 17.5-3.13G
SOLUTION, RECONSTITUTED, ORAL ORAL
Qty: 2 EACH | Refills: 0 | Status: SHIPPED | OUTPATIENT
Start: 2025-02-25

## 2025-02-25 NOTE — PROGRESS NOTES
"Subjective     History of Present Illness:   Jose Gaviria \"Crow\" is a 64 y.o. male with a PMH significant for prostate cancer, MDS, cerebral aneurysm, CAD,  HTN, HLD, COPD/asthma, fluid retention, diverticulosis, umbilical hernia, who presents to GI clinic for evaluation of ELAINA.      Anemia: Patient presents for presents evaluation of anemia. Anemia was found by routine CBC performed for the monitoring of MDS and has had similar episodes following procedures in the past. Was started on iron supplementation which resolved his symptoms, but has never had an EGD or colonoscopy. Dr. Pierce, his hematologist was concerned about GI losses and placed a referral. Patient has also been having issues with constipation following initiation of iron supplements and pain medications. Denies use of NSAIDs, but has been having abdominal pain with his constipation. Attempted senna and colace without significant improvement in BM        Past Medical History   has a past medical history of Encounter for immunization (09/26/2016), Personal history of other diseases of the musculoskeletal system and connective tissue (04/14/2014), and Unspecified open wound of unspecified finger without damage to nail, subsequent encounter (06/11/2020).     Social History   reports that he quit smoking about 7 years ago. His smoking use included cigarettes. He has never used smokeless tobacco.     Family History  family history includes Heart attack in his father and mother.     Allergies  Allergies   Allergen Reactions    Ciprofloxacin Other and Hallucinations     \"makes me feel strange\"       Medications  Current Outpatient Medications   Medication Instructions    amoxicillin (Amoxil) 500 mg capsule Take 4 caps (2000 mg) 30-60mins prior to your dental appointment    aspirin 81 mg EC tablet 1 tablet, Daily    atorvastatin (LIPITOR) 20 mg, oral, Daily    ferrous sulfate 325 mg, oral, Daily, Ferrous sulfate 325 mg tablet, 1 tablet half an hour after food " "daily.    fluticasone (Flonase) 50 mcg/actuation nasal spray 2 sprays, Each Nostril, Daily    HYDROcodone-acetaminophen (Norco) 5-325 mg tablet 2 tablets, oral, 3 times daily PRN    losartan (COZAAR) 100 mg, oral, Daily    multivitamin capsule 1 capsule, Daily    multivitamin with minerals (multivit-min-iron fum-folic ac) tablet 1 tablet, Daily    naloxone (NARCAN) 1 mg, Once    spironolactone (ALDACTONE) 25 mg, oral, Daily    tiotropium (Spiriva) 18 mcg inhalation capsule 31 capsules, Daily RT    torsemide (Demadex) 20 mg tablet 1 tablet, 2 times daily        Objective   There were no vitals taken for this visit.   Physical Exam  Vitals reviewed.   Constitutional:       General: He is awake.   Pulmonary:      Effort: Pulmonary effort is normal.      Breath sounds: Normal breath sounds.   Neurological:      Mental Status: He is alert and oriented to person, place, and time.   Psychiatric:         Attention and Perception: Attention and perception normal.         Behavior: Behavior normal.       Assessment/Plan   Jose Gaviria \"Crow\" is a 64 y.o. male who presents to GI clinic for evaluation of ELAINA and constipation. Recommend replacing sennakot with miralax up to 3 capfuls per day. Suspect patient has drug induced constipation from iron and norco. Will plan for EGD and colonoscopy for evaluation of ELAINA. Follow up after endoscopy for next steps.     We discussed the risks associated with an EGD including the risk of bleeding, infection, perforation, internal hemorrhage. Patient demonstrated understanding of the associated risks and willingness to proceed.     We discussed the risks associated with a colonoscopy including the risk of bleeding, infection, perforation, internal hemorrhage. Patient demonstrated understanding of the associated risks and willingness to proceed.       Pal Mims MD         "

## 2025-03-10 RX ORDER — ONDANSETRON HYDROCHLORIDE 2 MG/ML
4 INJECTION, SOLUTION INTRAVENOUS ONCE AS NEEDED
Status: CANCELLED | OUTPATIENT
Start: 2025-03-10

## 2025-03-12 ENCOUNTER — HOSPITAL ENCOUNTER (OUTPATIENT)
Dept: GASTROENTEROLOGY | Facility: HOSPITAL | Age: 65
Discharge: HOME | End: 2025-03-12
Payer: COMMERCIAL

## 2025-03-12 ENCOUNTER — ANESTHESIA (OUTPATIENT)
Dept: GASTROENTEROLOGY | Facility: HOSPITAL | Age: 65
End: 2025-03-12
Payer: COMMERCIAL

## 2025-03-12 ENCOUNTER — ANESTHESIA EVENT (OUTPATIENT)
Dept: GASTROENTEROLOGY | Facility: HOSPITAL | Age: 65
End: 2025-03-12
Payer: COMMERCIAL

## 2025-03-12 ENCOUNTER — APPOINTMENT (OUTPATIENT)
Dept: GASTROENTEROLOGY | Facility: CLINIC | Age: 65
End: 2025-03-12
Payer: COMMERCIAL

## 2025-03-12 VITALS
RESPIRATION RATE: 16 BRPM | DIASTOLIC BLOOD PRESSURE: 72 MMHG | WEIGHT: 264 LBS | BODY MASS INDEX: 40.01 KG/M2 | TEMPERATURE: 97.7 F | OXYGEN SATURATION: 96 % | SYSTOLIC BLOOD PRESSURE: 130 MMHG | HEIGHT: 68 IN | HEART RATE: 61 BPM

## 2025-03-12 DIAGNOSIS — D50.9 IRON DEFICIENCY ANEMIA, UNSPECIFIED IRON DEFICIENCY ANEMIA TYPE: ICD-10-CM

## 2025-03-12 PROCEDURE — 2720000007 HC OR 272 NO HCPCS

## 2025-03-12 PROCEDURE — 3700000001 HC GENERAL ANESTHESIA TIME - INITIAL BASE CHARGE

## 2025-03-12 PROCEDURE — A45390 PR COLONOSCOPY FLX W/ENDOSCOPIC MUCOSAL RESECTION: Performed by: ANESTHESIOLOGY

## 2025-03-12 PROCEDURE — 3700000002 HC GENERAL ANESTHESIA TIME - EACH INCREMENTAL 1 MINUTE

## 2025-03-12 PROCEDURE — 7100000010 HC PHASE TWO TIME - EACH INCREMENTAL 1 MINUTE

## 2025-03-12 PROCEDURE — 2500000004 HC RX 250 GENERAL PHARMACY W/ HCPCS (ALT 636 FOR OP/ED): Performed by: ANESTHESIOLOGIST ASSISTANT

## 2025-03-12 PROCEDURE — A45390 PR COLONOSCOPY FLX W/ENDOSCOPIC MUCOSAL RESECTION: Performed by: ANESTHESIOLOGIST ASSISTANT

## 2025-03-12 PROCEDURE — 2500000005 HC RX 250 GENERAL PHARMACY W/O HCPCS: Performed by: ANESTHESIOLOGIST ASSISTANT

## 2025-03-12 PROCEDURE — 45390 COLONOSCOPY W/RESECTION: CPT | Performed by: STUDENT IN AN ORGANIZED HEALTH CARE EDUCATION/TRAINING PROGRAM

## 2025-03-12 PROCEDURE — 45385 COLONOSCOPY W/LESION REMOVAL: CPT | Performed by: STUDENT IN AN ORGANIZED HEALTH CARE EDUCATION/TRAINING PROGRAM

## 2025-03-12 PROCEDURE — 43239 EGD BIOPSY SINGLE/MULTIPLE: CPT | Performed by: STUDENT IN AN ORGANIZED HEALTH CARE EDUCATION/TRAINING PROGRAM

## 2025-03-12 PROCEDURE — 7100000009 HC PHASE TWO TIME - INITIAL BASE CHARGE

## 2025-03-12 RX ORDER — PANTOPRAZOLE SODIUM 40 MG/1
40 TABLET, DELAYED RELEASE ORAL
Qty: 90 TABLET | Refills: 3 | Status: SHIPPED | OUTPATIENT
Start: 2025-03-12

## 2025-03-12 RX ORDER — PROPOFOL 10 MG/ML
INJECTION, EMULSION INTRAVENOUS AS NEEDED
Status: DISCONTINUED | OUTPATIENT
Start: 2025-03-12 | End: 2025-03-12

## 2025-03-12 RX ORDER — LIDOCAINE HCL/PF 100 MG/5ML
SYRINGE (ML) INTRAVENOUS AS NEEDED
Status: DISCONTINUED | OUTPATIENT
Start: 2025-03-12 | End: 2025-03-12

## 2025-03-12 RX ORDER — LIDOCAINE HYDROCHLORIDE 20 MG/ML
SOLUTION OROPHARYNGEAL AS NEEDED
Status: DISCONTINUED | OUTPATIENT
Start: 2025-03-12 | End: 2025-03-12

## 2025-03-12 RX ADMIN — LIDOCAINE HYDROCHLORIDE 100 MG: 20 INJECTION, SOLUTION INTRAVENOUS at 11:18

## 2025-03-12 RX ADMIN — PROPOFOL 100 MG: 10 INJECTION, EMULSION INTRAVENOUS at 11:18

## 2025-03-12 RX ADMIN — LIDOCAINE HYDROCHLORIDE 5 ML: 20 SOLUTION ORAL; TOPICAL at 11:16

## 2025-03-12 RX ADMIN — PROPOFOL 125 MCG/KG/MIN: 10 INJECTION, EMULSION INTRAVENOUS at 11:19

## 2025-03-12 SDOH — HEALTH STABILITY: MENTAL HEALTH: CURRENT SMOKER: 0

## 2025-03-12 ASSESSMENT — PAIN SCALES - GENERAL
PAINLEVEL_OUTOF10: 0 - NO PAIN

## 2025-03-12 ASSESSMENT — PAIN - FUNCTIONAL ASSESSMENT
PAIN_FUNCTIONAL_ASSESSMENT: 0-10

## 2025-03-12 NOTE — DISCHARGE INSTRUCTIONS
Patient Instructions after an Endoscopy      Post-procedure recommendations:  - The patient will be observed post-procedure, until all discharge criteria are met.   - Discharge the patient to home with family member/escort.  - Resume previous diet.  - Continue present medications.  - Observe patient's clinical course following today's procedure with therapeutic intervention.   - Watch for bleeding, perforation, and infection.   - Follow-up with referring physician.   - Return to primary care physician.  - The patient has a contact number available for  emergencies.  The signs and symptoms of potential delayed complications were discussed with the patient.  Return to normal activities tomorrow.  Written discharge instructions were provided to the patient.    The anesthetics, sedatives or narcotics which were given to you today will be acting in your body for the next 24 hours, so you might feel a little sleepy or groggy.  This feeling should slowly wear off. Carefully read and follow the instructions.     You received sedation today:  - Do not drive or operate any machinery or power tools of any kind.   - No alcoholic beverages today, not even beer or wine.  - Do not make any important decisions or sign any legal documents.  - No over the counter medications that contain alcohol or that may cause drowsiness.  - Do not make any important decisions or sign any legal documents.    While it is common to experience mild to moderate abdominal distention, gas, or belching after your procedure, if any of these symptoms occur following discharge from the GI Lab or within one week of having your procedure, call the [facility] to be advised whether a visit to your nearest Urgent Care or Emergency Department is indicated.  Take this paper with you if you go:  - If you develop an allergic reaction to the medications that were given during your procedure such as difficulty breathing, rash, hives, severe nausea,  vomiting or lightheadedness.  - If you experience chest pain, shortness of breath, severe abdominal pain, fevers and chills.  - If you develop signs and symptoms of bleeding such as blood in your spit, if your stools turn black, tarry, or bloody.  - If you have not urinated within 8 hours following your procedure.  - If your IV site becomes painful, red, inflamed, or looks infected.       If you experience any problems or have any questions following discharge from the GI Lab, please call: [phone]

## 2025-03-12 NOTE — ANESTHESIA POSTPROCEDURE EVALUATION
"Patient: Jose Gaviria \"Crow\"    Procedure Summary       Date: 03/12/25 Room / Location: [facility]    Anesthesia Start: 1114 Anesthesia Stop: 1209    Procedures:       EGD      COLONOSCOPY Diagnosis: Iron deficiency anemia, unspecified iron deficiency anemia type    Scheduled Providers: Pal Mims MD; Dilan Huerta MD Responsible Provider: Dilan Huerta MD    Anesthesia Type: MAC ASA Status: 3            Anesthesia Type: MAC    Vitals Value Taken Time   /68 03/12/25 1210   Temp 36.2 03/12/25 1210   Pulse 81 03/12/25 1210   Resp 16 03/12/25 1210   SpO2 96 03/12/25 1210       Anesthesia Post Evaluation    Patient participation: complete - patient participated  Level of consciousness: awake  Pain management: adequate  Airway patency: patent  Cardiovascular status: acceptable  Respiratory status: acceptable  Hydration status: acceptable  Postoperative Nausea and Vomiting: none        No notable events documented.    "

## 2025-03-12 NOTE — ANESTHESIA PREPROCEDURE EVALUATION
"Patient: Jose Gaviria \"Crow\"    Procedure Information       Date/Time: 03/12/25 1100    Scheduled providers: Pal Mims MD; Dilan Huerta MD    Procedures:       EGD      COLONOSCOPY    Location: [facility]            Relevant Problems   Anesthesia (within normal limits)      Cardiac   (+) Acute on chronic heart failure with preserved ejection fraction   (+) Angina, class III (CMS-HCC)   (+) Aortic stenosis   (+) Benign essential hypertension   (+) Essential hypertension   (+) Moderate aortic regurgitation   (+) Pure hypercholesterolemia   (+) Systolic hypertension      Pulmonary   (+) COPD (chronic obstructive pulmonary disease) (Multi)   (+) KENDALL (dyspnea on exertion)      Neuro   (+) Anxiety state   (+) Basilar artery aneurysm (CMS-HCC)   (+) Brain aneurysm (HHS-HCC)   (+) Depression with anxiety   (+) Dysthymia   (+) Intracranial aneurysm (HHS-HCC)   (+) Localization-related (focal) (partial) symptomatic epilepsy and epileptic syndromes with complex partial seizures, not intractable, without status epilepticus   (+) Seizure (Multi)   (+) Seizures (Multi)   (+) Third cranial nerve palsy   (+) Unruptured cerebral aneurysm (HHS-HCC)      GI   (+) Gastroesophageal reflux disease      /Renal   (+) Adenocarcinoma of prostate (Multi)   (+) BPH (benign prostatic hyperplasia)   (+) Malignant neoplasm of prostate (Multi)   (+) Prostate cancer (Multi)      Endocrine   (+) Class 3 severe obesity with body mass index (BMI) of 40.0 to 44.9 in adult   (+) Type 2 diabetes mellitus without complications (Multi)      Hematology   (+) Anemia   (+) Iron deficiency anemia   (+) Myelodysplastic syndrome (Multi)   (+) Pancytopenia   (+) Thrombocytopenia (CMS-HCC)      Musculoskeletal   (+) Chronic pain syndrome      HEENT   (+) Acute sinusitis   (+) Narrow angle glaucoma of right eye      ID   (+) Furuncle   (+) Localized infection   (+) Skin infection      Skin   (+) SCC (squamous cell carcinoma)       Clinical " information reviewed:   Tobacco  Allergies  Meds   Med Hx  Surg Hx   Fam Hx  Soc Hx        NPO Detail:  NPO/Void Status  Date of Last Liquid: 03/12/25  Time of Last Liquid: 0800  Date of Last Solid: 03/10/25  Time of Last Solid: 1900  Last Intake Type: Clear fluids         Physical Exam    Airway  Mallampati: III  TM distance: >3 FB  Neck ROM: full     Cardiovascular - normal exam  Rhythm: regular  Rate: normal     Dental   (+) upper dentures     Pulmonary - normal exam  Breath sounds clear to auscultation     Abdominal            Anesthesia Plan    History of general anesthesia?: yes  History of complications of general anesthesia?: no    ASA 3     MAC     The patient is not a current smoker.    intravenous induction   Anesthetic plan and risks discussed with patient.  Use of blood products discussed with patient who.    Plan discussed with CAA.

## 2025-03-12 NOTE — H&P
"Procedure H&P    Patient Profile-Procedures  Name Jose Gavriia  Date of Birth 1960  MRN [id number]  Address   [address] [id number] [facility]  [place] [address]    Primary Phone Number [phone]  Secondary Phone Number    Li Saxena    Procedure(s):  Procedures: EGD and Colonoscopy  Primary contact name and number   Extended Emergency Contact Information  Primary Emergency Contact: Darcy Gaviria  Address: [address] [place] [address] [facility]  Home Phone: [phone]  Mobile Phone: [phone]  Relation: Spouse   needed? No  Secondary Emergency Contact: Alyssa Gaviria  Home Phone: [phone]  Mobile Phone: [phone]  Relation: Child   needed? No    General Health  Weight   Vitals:    03/12/25 1014   Weight: 120 kg (264 lb)     BMI Body mass index is 40.14 kg/m².    Allergies  Allergies   Allergen Reactions    Ciprofloxacin Other and Hallucinations     \"makes me feel strange\"       Past Medical History   Past Medical History:   Diagnosis Date    Encounter for immunization 09/26/2016    Flu vaccine need    Personal history of other diseases of the musculoskeletal system and connective tissue 04/14/2014    History of low back pain    Unspecified open wound of unspecified finger without damage to nail, subsequent encounter 06/11/2020    Wound, open, finger, subsequent encounter       Provider assessment  Diagnosis: Anemia  Medication Reviewed - yes  Prior to Admission medications    Medication Sig Start Date End Date Taking? Authorizing Provider   amoxicillin (Amoxil) 500 mg capsule Take 4 caps (2000 mg) 30-60mins prior to your dental appointment 3/28/24  Yes Mic Claire, APRN-CNP   aspirin 81 mg EC tablet Take 1 tablet (81 mg) by mouth once daily.   Yes Historical Provider, MD   atorvastatin (Lipitor) 20 mg tablet TAKE 1 TABLET DAILY 1/24/25  Yes Li Cloud,    ferrous sulfate 325 (65 Fe) MG EC tablet Take 1 tablet (325 mg) " by mouth once daily. Ferrous sulfate 325 mg tablet, 1 tablet half an hour after food daily. 10/30/23  Yes Emmanuel Pierce MD   HYDROcodone-acetaminophen (Norco) 5-325 mg tablet Take 2 tablets by mouth 3 times a day as needed for severe pain (7 - 10). 2/20/25 3/22/25 Yes Li Cloud DO   losartan (Cozaar) 100 mg tablet TAKE 1 TABLET DAILY 1/24/25  Yes Li Cloud DO   multivitamin capsule Take 1 capsule by mouth once daily.   Yes Historical Provider, MD   multivitamin with minerals (multivit-min-iron fum-folic ac) tablet Take 1 tablet by mouth once daily.   Yes Historical Provider, MD   spironolactone (Aldactone) 25 mg tablet TAKE 1 TABLET DAILY 8/12/24  Yes Li Cloud DO   tiotropium (Spiriva) 18 mcg inhalation capsule Place 31 capsules (558 mcg) into inhaler and inhale once daily. 6/10/18  Yes Historical Provider, MD   torsemide (Demadex) 20 mg tablet Take 1 tablet (20 mg) by mouth 2 times a day. 5/20/21  Yes Historical Provider, MD   fluticasone (Flonase) 50 mcg/actuation nasal spray Administer 2 sprays into each nostril once daily.  Patient not taking: Reported on 3/12/2025 2/19/25   Li Cloud DO   naloxone (Narcan) 4 mg/0.1 mL nasal spray Administer 1 mg into affected nostril(s) 1 time. may repeat in 1 to 2 hours if symptoms recur 5/13/20   Historical Provider, MD   sodium,potassium,mag sulfates (Suprep) 17.5-3.13-1.6 gram solution Take as per included split prep instructions 2/25/25 3/12/25  Pal Mims MD       Physical Exam  Vitals:    03/12/25 1014   BP: 161/74   Pulse: 69   Resp: 18   Temp: 36.5 °C (97.7 °F)   SpO2: 94%        General: A&Ox3, NAD.  HEENT: AT/NC.   CV: RRR. No murmur.  Resp: CTA bilaterally. No wheezing, rhonchi or rales.   GI: Soft, NT/ND. BSx4.  Extrem: No edema. Pulses intact.  Neuro: No focal deficits.   Psych: Normal mood and affect.      Procedure Plan - pre-procedural (re)assesment completed by physician:  discharge/transfer patient when discharge criteria  met    Pal Mims MD  3/12/2025 10:21 AM

## 2025-03-18 ENCOUNTER — APPOINTMENT (OUTPATIENT)
Dept: PRIMARY CARE | Facility: CLINIC | Age: 65
End: 2025-03-18
Payer: COMMERCIAL

## 2025-03-18 VITALS
OXYGEN SATURATION: 95 % | DIASTOLIC BLOOD PRESSURE: 70 MMHG | WEIGHT: 265 LBS | SYSTOLIC BLOOD PRESSURE: 130 MMHG | HEIGHT: 68 IN | BODY MASS INDEX: 40.16 KG/M2 | HEART RATE: 67 BPM

## 2025-03-18 DIAGNOSIS — M54.50 CHRONIC BILATERAL LOW BACK PAIN WITHOUT SCIATICA: Chronic | ICD-10-CM

## 2025-03-18 DIAGNOSIS — G89.4 CHRONIC PAIN SYNDROME: ICD-10-CM

## 2025-03-18 DIAGNOSIS — R56.9 SEIZURES (MULTI): ICD-10-CM

## 2025-03-18 DIAGNOSIS — E11.9 TYPE 2 DIABETES MELLITUS WITHOUT COMPLICATION, WITHOUT LONG-TERM CURRENT USE OF INSULIN (MULTI): ICD-10-CM

## 2025-03-18 DIAGNOSIS — D46.9 MYELODYSPLASTIC SYNDROME, UNSPECIFIED (MULTI): ICD-10-CM

## 2025-03-18 DIAGNOSIS — C61 ADENOCARCINOMA OF PROSTATE (MULTI): ICD-10-CM

## 2025-03-18 DIAGNOSIS — G89.29 CHRONIC BILATERAL LOW BACK PAIN WITHOUT SCIATICA: Chronic | ICD-10-CM

## 2025-03-18 DIAGNOSIS — I67.1 BRAIN ANEURYSM (HHS-HCC): ICD-10-CM

## 2025-03-18 DIAGNOSIS — J44.9 CHRONIC OBSTRUCTIVE PULMONARY DISEASE, UNSPECIFIED COPD TYPE (MULTI): ICD-10-CM

## 2025-03-18 PROCEDURE — 1160F RVW MEDS BY RX/DR IN RCRD: CPT | Performed by: STUDENT IN AN ORGANIZED HEALTH CARE EDUCATION/TRAINING PROGRAM

## 2025-03-18 PROCEDURE — 1159F MED LIST DOCD IN RCRD: CPT | Performed by: STUDENT IN AN ORGANIZED HEALTH CARE EDUCATION/TRAINING PROGRAM

## 2025-03-18 PROCEDURE — 99214 OFFICE O/P EST MOD 30 MIN: CPT | Performed by: STUDENT IN AN ORGANIZED HEALTH CARE EDUCATION/TRAINING PROGRAM

## 2025-03-18 PROCEDURE — 3075F SYST BP GE 130 - 139MM HG: CPT | Performed by: STUDENT IN AN ORGANIZED HEALTH CARE EDUCATION/TRAINING PROGRAM

## 2025-03-18 PROCEDURE — 4010F ACE/ARB THERAPY RXD/TAKEN: CPT | Performed by: STUDENT IN AN ORGANIZED HEALTH CARE EDUCATION/TRAINING PROGRAM

## 2025-03-18 PROCEDURE — G2211 COMPLEX E/M VISIT ADD ON: HCPCS | Performed by: STUDENT IN AN ORGANIZED HEALTH CARE EDUCATION/TRAINING PROGRAM

## 2025-03-18 PROCEDURE — 3008F BODY MASS INDEX DOCD: CPT | Performed by: STUDENT IN AN ORGANIZED HEALTH CARE EDUCATION/TRAINING PROGRAM

## 2025-03-18 PROCEDURE — 3078F DIAST BP <80 MM HG: CPT | Performed by: STUDENT IN AN ORGANIZED HEALTH CARE EDUCATION/TRAINING PROGRAM

## 2025-03-18 PROCEDURE — 1036F TOBACCO NON-USER: CPT | Performed by: STUDENT IN AN ORGANIZED HEALTH CARE EDUCATION/TRAINING PROGRAM

## 2025-03-18 RX ORDER — HYDROCODONE BITARTRATE AND ACETAMINOPHEN 5; 325 MG/1; MG/1
2 TABLET ORAL 3 TIMES DAILY PRN
Qty: 180 TABLET | Refills: 0 | Status: SHIPPED | OUTPATIENT
Start: 2025-03-18 | End: 2025-04-17

## 2025-03-18 ASSESSMENT — PROMIS GLOBAL HEALTH SCALE
RATE_GENERAL_HEALTH: POOR
RATE_AVERAGE_PAIN: 7
CARRYOUT_SOCIAL_ACTIVITIES: POOR
RATE_QUALITY_OF_LIFE: FAIR
CARRYOUT_PHYSICAL_ACTIVITIES: A LITTLE
RATE_PHYSICAL_HEALTH: POOR
EMOTIONAL_PROBLEMS: SOMETIMES
RATE_SOCIAL_SATISFACTION: FAIR
RATE_MENTAL_HEALTH: FAIR
RATE_AVERAGE_FATIGUE: SEVERE

## 2025-03-18 ASSESSMENT — ENCOUNTER SYMPTOMS
ABDOMINAL PAIN: 1
CONSTIPATION: 1
SHORTNESS OF BREATH: 1
ARTHRALGIAS: 1
APPETITE CHANGE: 0
FATIGUE: 1
BACK PAIN: 1
NEUROLOGICAL NEGATIVE: 1
CARDIOVASCULAR NEGATIVE: 1
VOMITING: 0
NAUSEA: 0
SLEEP DISTURBANCE: 1

## 2025-03-18 NOTE — PROGRESS NOTES
"Subjective   Patient ID: Jose Garcia" is a 64 y.o. male who presents for Annual Exam (Physical /Just had EGD and colonoscopy last week).  History of Present Illness  Jose Garcia" is a 64 year old male who presents with stomach pain and constipation. He was referred by his hematologist to GI due to low blood counts and suspected gastrointestinal bleeding.    He experiences significant stomach pain, described as a 'bad stomach ache' that comes and goes. The pain is sometimes located in the center of his abdomen and can fluctuate between the upper and lower abdomen, occasionally near a previous hernia site. The pain is alleviated when he lies down. No nausea or vomiting is associated with the pain, and his appetite remains unaffected. He underwent a colonoscopy last Wednesday, and the pathology results are pending. He was informed that the bleeding might be related to polyps found during the procedure.    Since starting iron supplements for his low blood counts, he has experienced constipation. He initially took Miralax daily, but now uses it four to five times a week. The colonoscopy prep provided temporary relief from his symptoms, but the pain has since returned.    He has a history of a repaired umbilical hernia in 2016, which initially presented as a large protrusion at the belly button. He attributes the hernia to his previous occupation as a , which involved heavy lifting. He notes that the hernia site has recently become painful, which is a new development.    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 He did this work in his twenties and forties.    No nausea, vomiting, or changes in appetite. No pain or pressure in his ears, and his sinuses have been less bothersome compared to previous cantor. No swelling in his ankles.    Review of Systems   Constitutional:  Positive for fatigue. Negative for appetite change. " "  Respiratory:  Positive for shortness of breath.    Cardiovascular: Negative.    Gastrointestinal:  Positive for abdominal pain and constipation. Negative for nausea and vomiting.   Musculoskeletal:  Positive for arthralgias and back pain.   Neurological: Negative.    Psychiatric/Behavioral:  Positive for sleep disturbance.    All other systems reviewed and are negative.      Objective     /70 (BP Location: Right arm, Patient Position: Sitting, BP Cuff Size: Large adult)   Pulse 67   Ht 1.727 m (5' 8\")   Wt 120 kg (265 lb)   SpO2 95%   BMI 40.29 kg/m²      Physical Exam  GENERAL: Alert, cooperative, well developed, no acute distress.  HEENT: Normocephalic, normal oropharynx, moist mucous membranes, nasal passages clear, no mucus.  NECK: Supple, no masses, thyroid normal.  CHEST: Clear to auscultation bilaterally, no wheezes, rhonchi, or crackles.  CARDIOVASCULAR: Normal heart rate and rhythm, S1 and S2 normal without murmurs.  ABDOMEN: Tender to palpation in the center, soft, non-distended, without organomegaly, normal bowel sounds.  EXTREMITIES: No cyanosis or edema.  NEUROLOGICAL: Cranial nerves grossly intact, moves all extremities without gross motor or sensory deficit.    Results  DIAGNOSTIC  Colonoscopy: Polyps found, pathology pending (03/12/2025)    Assessment & Plan  Abdominal Pain  Severe, intermittent abdominal pain, diffuse, sometimes central, and near a previous hernia site. Not associated with nausea, vomiting, or appetite changes. Constipation likely due to iron supplementation for anemia. Pain improves with bowel movements and lying down. Currently using Miralax with some relief. Possible relation to bleeding from polyps, pending pathology results.  - Continue Miralax 4-5 times a week, increase to twice daily if constipation worsens  - Follow up with GI for colonoscopy results and further evaluation  - Consider referral to general surgery for hernia evaluation if pain " persists    Constipation  Likely secondary to iron supplementation for anemia. Managed with Miralax, with adjustments based on symptoms.  - Continue Miralax 4-5 times a week, increase to twice daily if constipation worsens    Centrilobular Emphysema  Respiratory symptoms well-managed with prescribed inhalers.    Chronic Pain Syndrome  Manageable chronic pain, using hydrocodone-acetaminophen 5-325 mg orally 3 times daily as needed.  - Refill hydrocodone-acetaminophen prescription  UTD on CSA and UDS, both due 6/2025    Follow-up  Awaiting colonoscopy pathology results for potential bleeding and polyps. Follow-up with GI planned post-results.  - Follow up with GI for colonoscopy results  - Schedule follow-up with internal medicine    Li Cloud,      OARRS report reviewed for Jose Gaviria today and is consistent with prescribed therapy.  We weighed the risks and benefit of this therapy and will continue with the current plan      This medical note was created with the assistance of artificial intelligence (AI) for documentation purposes. The content has been reviewed and confirmed by the healthcare provider for accuracy and completeness. Patient consented to the use of audio recording and use of AI during their visit.

## 2025-03-20 LAB
LABORATORY COMMENT REPORT: NORMAL
PATH REPORT.FINAL DX SPEC: NORMAL
PATH REPORT.GROSS SPEC: NORMAL
PATH REPORT.RELEVANT HX SPEC: NORMAL
PATH REPORT.TOTAL CANCER: NORMAL
RESIDENT REVIEW: NORMAL

## 2025-03-27 ENCOUNTER — PATIENT MESSAGE (OUTPATIENT)
Dept: PRIMARY CARE | Facility: CLINIC | Age: 65
End: 2025-03-27
Payer: COMMERCIAL

## 2025-03-27 ENCOUNTER — TELEPHONE (OUTPATIENT)
Dept: GASTROENTEROLOGY | Facility: CLINIC | Age: 65
End: 2025-03-27
Payer: COMMERCIAL

## 2025-03-27 DIAGNOSIS — K21.9 GASTROESOPHAGEAL REFLUX DISEASE WITHOUT ESOPHAGITIS: Primary | ICD-10-CM

## 2025-03-27 NOTE — TELEPHONE ENCOUNTER
Left a VM to call me back at  to schedule clinic appointment with Dr. Mims in 2 months end of May.

## 2025-03-28 RX ORDER — SUCRALFATE 1 G/10ML
1 SUSPENSION ORAL 4 TIMES DAILY
Qty: 1200 ML | Refills: 0 | Status: SHIPPED | OUTPATIENT
Start: 2025-03-28 | End: 2025-04-27

## 2025-04-21 DIAGNOSIS — R09.81 NASAL CONGESTION: ICD-10-CM

## 2025-04-21 RX ORDER — FLUTICASONE PROPIONATE 50 MCG
2 SPRAY, SUSPENSION (ML) NASAL DAILY
Qty: 16 G | Refills: 1 | Status: SHIPPED | OUTPATIENT
Start: 2025-04-21

## 2025-04-23 DIAGNOSIS — G89.4 CHRONIC PAIN SYNDROME: ICD-10-CM

## 2025-04-23 DIAGNOSIS — M54.50 CHRONIC BILATERAL LOW BACK PAIN WITHOUT SCIATICA: Chronic | ICD-10-CM

## 2025-04-23 DIAGNOSIS — G89.29 CHRONIC BILATERAL LOW BACK PAIN WITHOUT SCIATICA: Chronic | ICD-10-CM

## 2025-04-23 RX ORDER — HYDROCODONE BITARTRATE AND ACETAMINOPHEN 5; 325 MG/1; MG/1
2 TABLET ORAL 3 TIMES DAILY PRN
Qty: 180 TABLET | Refills: 0 | Status: SHIPPED | OUTPATIENT
Start: 2025-04-23 | End: 2025-05-23

## 2025-04-24 LAB
EST. AVERAGE GLUCOSE BLD GHB EST-MCNC: 105 MG/DL
EST. AVERAGE GLUCOSE BLD GHB EST-SCNC: 5.8 MMOL/L
HBA1C MFR BLD: 5.3 %

## 2025-04-28 DIAGNOSIS — K21.9 GASTROESOPHAGEAL REFLUX DISEASE WITHOUT ESOPHAGITIS: ICD-10-CM

## 2025-04-28 RX ORDER — SUCRALFATE 1 G/10ML
1 SUSPENSION ORAL 4 TIMES DAILY
Qty: 1200 ML | Refills: 0 | Status: SHIPPED | OUTPATIENT
Start: 2025-04-28 | End: 2025-05-28

## 2025-04-29 ENCOUNTER — HOSPITAL ENCOUNTER (OUTPATIENT)
Dept: RADIOLOGY | Facility: CLINIC | Age: 65
Discharge: HOME | End: 2025-04-29
Payer: MEDICARE

## 2025-04-29 DIAGNOSIS — R10.84 GENERALIZED ABDOMINAL PAIN: ICD-10-CM

## 2025-04-29 PROCEDURE — 74177 CT ABD & PELVIS W/CONTRAST: CPT | Performed by: RADIOLOGY

## 2025-04-29 PROCEDURE — 2550000001 HC RX 255 CONTRASTS: Performed by: STUDENT IN AN ORGANIZED HEALTH CARE EDUCATION/TRAINING PROGRAM

## 2025-04-29 PROCEDURE — 74177 CT ABD & PELVIS W/CONTRAST: CPT

## 2025-04-29 RX ADMIN — IOHEXOL 75 ML: 350 INJECTION, SOLUTION INTRAVENOUS at 08:50

## 2025-05-13 ENCOUNTER — OFFICE VISIT (OUTPATIENT)
Dept: GASTROENTEROLOGY | Facility: CLINIC | Age: 65
End: 2025-05-13
Payer: COMMERCIAL

## 2025-05-13 ENCOUNTER — APPOINTMENT (OUTPATIENT)
Dept: GASTROENTEROLOGY | Facility: CLINIC | Age: 65
End: 2025-05-13
Payer: COMMERCIAL

## 2025-05-13 VITALS
HEIGHT: 69 IN | SYSTOLIC BLOOD PRESSURE: 144 MMHG | RESPIRATION RATE: 18 BRPM | WEIGHT: 262 LBS | BODY MASS INDEX: 38.8 KG/M2 | HEART RATE: 65 BPM | DIASTOLIC BLOOD PRESSURE: 82 MMHG

## 2025-05-13 DIAGNOSIS — K42.9 UMBILICAL HERNIA WITHOUT OBSTRUCTION AND WITHOUT GANGRENE: ICD-10-CM

## 2025-05-13 DIAGNOSIS — K59.03 DRUG INDUCED CONSTIPATION: Primary | ICD-10-CM

## 2025-05-13 DIAGNOSIS — K57.90 DIVERTICULOSIS: ICD-10-CM

## 2025-05-13 PROCEDURE — 1159F MED LIST DOCD IN RCRD: CPT | Performed by: STUDENT IN AN ORGANIZED HEALTH CARE EDUCATION/TRAINING PROGRAM

## 2025-05-13 PROCEDURE — 3077F SYST BP >= 140 MM HG: CPT | Performed by: STUDENT IN AN ORGANIZED HEALTH CARE EDUCATION/TRAINING PROGRAM

## 2025-05-13 PROCEDURE — 99214 OFFICE O/P EST MOD 30 MIN: CPT | Performed by: STUDENT IN AN ORGANIZED HEALTH CARE EDUCATION/TRAINING PROGRAM

## 2025-05-13 PROCEDURE — 3079F DIAST BP 80-89 MM HG: CPT | Performed by: STUDENT IN AN ORGANIZED HEALTH CARE EDUCATION/TRAINING PROGRAM

## 2025-05-13 PROCEDURE — 1036F TOBACCO NON-USER: CPT | Performed by: STUDENT IN AN ORGANIZED HEALTH CARE EDUCATION/TRAINING PROGRAM

## 2025-05-13 PROCEDURE — 3008F BODY MASS INDEX DOCD: CPT | Performed by: STUDENT IN AN ORGANIZED HEALTH CARE EDUCATION/TRAINING PROGRAM

## 2025-05-13 PROCEDURE — 4010F ACE/ARB THERAPY RXD/TAKEN: CPT | Performed by: STUDENT IN AN ORGANIZED HEALTH CARE EDUCATION/TRAINING PROGRAM

## 2025-05-13 RX ORDER — AMOXICILLIN AND CLAVULANATE POTASSIUM 500; 125 MG/1; MG/1
1 TABLET, FILM COATED ORAL 3 TIMES DAILY
Qty: 15 TABLET | Refills: 0 | Status: SHIPPED | OUTPATIENT
Start: 2025-05-13 | End: 2025-05-18

## 2025-05-13 NOTE — PROGRESS NOTES
"Subjective     History of Present Illness:   Jose Gaviria \"Brandon" is a 64 y.o. male with a PMH significant for prostate cancer, MDS, cerebral aneurysm, CAD,  HTN, HLD, COPD/asthma, fluid retention, diverticulosis, umbilical hernia, who presents to GI clinic for evaluation of ELAINA and abdominal pain.  Was started on iron supplementation which resolved his symptoms, but has never had an EGD or colonoscopy. Dr. Pierce, his hematologist was concerned about GI losses and placed a referral. Patient has also been having issues with constipation following initiation of iron supplements and pain medications. Denies use of NSAIDs, but has been having abdominal pain with his constipation. Attempted senna and colace without significant improvement in BM. Was switched to miralax without significant improvement either.     Having ongoing abdominal pain diffusely, improved by laying down. Underwent CT scan with PCP which demonstrated multiple abdominal hernias, possible sigmoid diverticulitis. Denies fevers, chills, nausea/vomiting, having occasional LLQ pain. Underwent EGD/colonoscopy 3/12/25    EGD:  C0M1 Schroeder's esophagus; electronic chromoendoscopy (NBI) was used; performed targeted cold forceps biopsies  Grade A esophagitis  The stomach appeared normal. Performed random biopsy to rule out H. pylori.  The duodenum appeared normal.  Colonoscopy:  The terminal ileum appeared normal.  One 10 mm polyp; lift performed; removed by EMR  6 polyps were removed with cold snare  Diverticulosis of mild severity in the sigmoid colon  Large amount of semi solid stool precluding adequate visualization. Inadequate prep            Past Medical History   has a past medical history of Encounter for immunization (09/26/2016), Personal history of other diseases of the musculoskeletal system and connective tissue (04/14/2014), and Unspecified open wound of unspecified finger without damage to nail, subsequent encounter (06/11/2020).     Social " "History   reports that he quit smoking about 7 years ago. His smoking use included cigarettes. He has never used smokeless tobacco. He reports that he does not drink alcohol and does not use drugs.     Family History  family history includes Heart attack in his father and mother.     Allergies  Allergies   Allergen Reactions    Ciprofloxacin Other and Hallucinations     \"makes me feel strange\"       Medications  Current Outpatient Medications   Medication Instructions    amoxicillin (Amoxil) 500 mg capsule Take 4 caps (2000 mg) 30-60mins prior to your dental appointment    aspirin 81 mg EC tablet 1 tablet, Daily    atorvastatin (LIPITOR) 20 mg, oral, Daily    ferrous sulfate 325 mg, oral, Daily, Ferrous sulfate 325 mg tablet, 1 tablet half an hour after food daily.    fluticasone (Flonase) 50 mcg/actuation nasal spray 2 sprays, Each Nostril, Daily    HYDROcodone-acetaminophen (Norco) 5-325 mg tablet 2 tablets, oral, 3 times daily PRN    losartan (COZAAR) 100 mg, oral, Daily    multivitamin capsule 1 capsule, Daily    multivitamin with minerals (multivit-min-iron fum-folic ac) tablet 1 tablet, Daily    naloxone (NARCAN) 1 mg, Once    pantoprazole (PROTONIX) 40 mg, oral, Daily before breakfast, Do not crush, chew, or split.    spironolactone (ALDACTONE) 25 mg, oral, Daily    sucralfate (CARAFATE) 1 g, oral, 4 times daily    tiotropium (Spiriva) 18 mcg inhalation capsule 31 capsules, Daily RT    torsemide (Demadex) 20 mg tablet 1 tablet, 2 times daily        Objective   Visit Vitals  /82   Pulse 65   Resp 18      Physical Exam  Vitals reviewed.   Constitutional:       General: He is awake.   Pulmonary:      Effort: Pulmonary effort is normal.      Breath sounds: Normal breath sounds.   Neurological:      Mental Status: He is alert and oriented to person, place, and time.   Psychiatric:         Attention and Perception: Attention and perception normal.         Behavior: Behavior normal.       Assessment/Plan " "  Jose VINCENZO Gaviria \"Crow\" is a 64 y.o. male who presents to GI clinic for evaluation of ELAINA and constipation. Recommend replacing miralax with linzess. Will start on augmentin for possible diverticulitis and refer to general surgery for evaluation of hernia due to resolution of sx when laying down, suspect patient may have symptomatic hernias. Reviewed CT scan independently, agree with findings. Reviewed labwork, pathology results from EGD/colon and procedure reports. Message via [employer] if linzess not effective in 2 weeks. Discussed dosing prior to breakfast.     Repeat colonoscopy in 1 year.       Pal Mims MD         "

## 2025-05-21 ENCOUNTER — TELEPHONE (OUTPATIENT)
Dept: PRIMARY CARE | Facility: CLINIC | Age: 65
End: 2025-05-21
Payer: COMMERCIAL

## 2025-05-21 DIAGNOSIS — M54.50 CHRONIC BILATERAL LOW BACK PAIN WITHOUT SCIATICA: Chronic | ICD-10-CM

## 2025-05-21 DIAGNOSIS — G89.29 CHRONIC BILATERAL LOW BACK PAIN WITHOUT SCIATICA: Chronic | ICD-10-CM

## 2025-05-21 DIAGNOSIS — G89.4 CHRONIC PAIN SYNDROME: ICD-10-CM

## 2025-05-21 RX ORDER — HYDROCODONE BITARTRATE AND ACETAMINOPHEN 5; 325 MG/1; MG/1
2 TABLET ORAL 3 TIMES DAILY PRN
Qty: 180 TABLET | Refills: 0 | Status: SHIPPED | OUTPATIENT
Start: 2025-05-21 | End: 2025-06-20

## 2025-05-22 ENCOUNTER — PREP FOR PROCEDURE (OUTPATIENT)
Dept: SURGERY | Facility: CLINIC | Age: 65
End: 2025-05-22

## 2025-05-22 ENCOUNTER — APPOINTMENT (OUTPATIENT)
Dept: SURGERY | Facility: CLINIC | Age: 65
End: 2025-05-22
Payer: MEDICARE

## 2025-05-22 VITALS
RESPIRATION RATE: 17 BRPM | HEIGHT: 69 IN | DIASTOLIC BLOOD PRESSURE: 78 MMHG | BODY MASS INDEX: 37.98 KG/M2 | HEART RATE: 90 BPM | SYSTOLIC BLOOD PRESSURE: 145 MMHG | OXYGEN SATURATION: 98 % | TEMPERATURE: 97.3 F | WEIGHT: 256.4 LBS

## 2025-05-22 DIAGNOSIS — K42.9 UMBILICAL HERNIA WITHOUT OBSTRUCTION AND WITHOUT GANGRENE: Primary | ICD-10-CM

## 2025-05-22 DIAGNOSIS — K43.9 VENTRAL HERNIA WITHOUT OBSTRUCTION OR GANGRENE: ICD-10-CM

## 2025-05-22 DIAGNOSIS — Z71.9 ENCOUNTER FOR CONSULTATION: ICD-10-CM

## 2025-05-22 DIAGNOSIS — K42.9 RECURRENT UMBILICAL HERNIA: ICD-10-CM

## 2025-05-22 PROCEDURE — 3008F BODY MASS INDEX DOCD: CPT | Performed by: SURGERY

## 2025-05-22 PROCEDURE — 1125F AMNT PAIN NOTED PAIN PRSNT: CPT | Performed by: SURGERY

## 2025-05-22 PROCEDURE — 99205 OFFICE O/P NEW HI 60 MIN: CPT | Performed by: SURGERY

## 2025-05-22 PROCEDURE — 3077F SYST BP >= 140 MM HG: CPT | Performed by: SURGERY

## 2025-05-22 PROCEDURE — 4010F ACE/ARB THERAPY RXD/TAKEN: CPT | Performed by: SURGERY

## 2025-05-22 PROCEDURE — 1159F MED LIST DOCD IN RCRD: CPT | Performed by: SURGERY

## 2025-05-22 PROCEDURE — 3078F DIAST BP <80 MM HG: CPT | Performed by: SURGERY

## 2025-05-22 PROCEDURE — 1036F TOBACCO NON-USER: CPT | Performed by: SURGERY

## 2025-05-22 RX ORDER — HEPARIN SODIUM 5000 [USP'U]/ML
5000 INJECTION, SOLUTION INTRAVENOUS; SUBCUTANEOUS ONCE
OUTPATIENT
Start: 2025-05-22 | End: 2025-05-22

## 2025-05-22 RX ORDER — GABAPENTIN 300 MG/1
600 CAPSULE ORAL ONCE
OUTPATIENT
Start: 2025-05-22 | End: 2025-05-22

## 2025-05-22 RX ORDER — OXCARBAZEPINE 150 MG/1
TABLET, FILM COATED ORAL EVERY 12 HOURS
COMMUNITY

## 2025-05-22 RX ORDER — CEFAZOLIN SODIUM 2 G/100ML
2 INJECTION, SOLUTION INTRAVENOUS ONCE
OUTPATIENT
Start: 2025-05-22 | End: 2025-05-22

## 2025-05-22 RX ORDER — SCOPOLAMINE 1 MG/3D
1 PATCH, EXTENDED RELEASE TRANSDERMAL
OUTPATIENT
Start: 2025-05-22 | End: 2025-05-25

## 2025-05-22 RX ORDER — CELECOXIB 400 MG/1
400 CAPSULE ORAL ONCE
OUTPATIENT
Start: 2025-05-22 | End: 2025-05-22

## 2025-05-22 RX ORDER — OXYCODONE AND ACETAMINOPHEN 7.5; 325 MG/1; MG/1
TABLET ORAL 2 TIMES DAILY PRN
COMMUNITY

## 2025-05-22 RX ORDER — ACETAMINOPHEN 325 MG/1
975 TABLET ORAL ONCE
OUTPATIENT
Start: 2025-05-22 | End: 2025-05-22

## 2025-05-22 ASSESSMENT — PAIN SCALES - GENERAL: PAINLEVEL_OUTOF10: 7

## 2025-05-22 NOTE — PROGRESS NOTES
"Subjective   Patient ID: Jose Gaviria \"Crow\" is a 64 y.o. male who presents for No chief complaint on file..  HPI  65 YO M BMI 39 prostate cancer, MDS, cerebral aneurysm, CAD,  HTN, HLD, COPD/asthma, fluid retention, diverticulosis, referred for ventral hernias. Prior TAVR. Patient has also been having issues with constipation following initiation of iron supplements and pain medications, seeing GI. Possible small bowel bleed / anemia?      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 He did this work in his twenties and forties.     He experiences significant stomach pain, described as a 'bad stomach ache' that comes and goes. He seems to indicate it is really bad and worsening. The pain is sometimes located in the center of his abdomen and can fluctuate between the upper and lower abdomen, occasionally near a previous hernia site which was repaired remotely with superficial infection. The pain is alleviated when he lies down. No nausea or vomiting is associated with the pain, and his appetite remains unaffected. Underwent CT scan with PCP which demonstrated multiple abdominal hernias, possible sigmoid diverticulitis (was given augmentin). Denies fevers, chills, nausea/vomiting, having occasional LLQ pain. Underwent EGD/colonoscopy 03/12/25. C0M1 Schroeder's esophagus; electronic chromoendoscopy (NBI) was used; performed targeted cold forceps biopsies. Grade A esophagitis.One 10 mm polyp; lift performed; removed by EMR. 6 polyps were removed with cold snare. Diverticulosis of mild severity in the sigmoid colon.    CT ABD PEL 04/29/2025. Hernia width 7.5 cm and 4.5 cm. 11 cm total length. Incarcerated fat. Weakness around umbilicus too.    Manageable chronic pain, using hydrocodone-acetaminophen 5-325 mg orally 3 times daily as needed. Will make pain control tough after surgery.    Review of Systems    Objective   Physical Exam    Assessment/Plan "   Problem List Items Addressed This Visit           ICD-10-CM       Gastrointestinal and Abdominal    Hernia, umbilical - Primary K42.9    Ventral hernia without obstruction or gangrene K43.9    Relevant Orders    Case Request Operating Room: Repair Ventral Hernia, Creation Fascia Flap Torso, Removal Therapeutic Implant Abdominal Cavity (Completed)    Request for Pre-Admission Testing Visit    Recurrent umbilical hernia K42.9    Relevant Orders    Case Request Operating Room: Repair Ventral Hernia, Creation Fascia Flap Torso, Removal Therapeutic Implant Abdominal Cavity (Completed)    Request for Pre-Admission Testing Visit     Other Visit Diagnoses         Codes      Encounter for consultation     Z71.9          Prefer open approach due to BMI, anemia, and COPD history.  Consented. Anticipate larger than average piece of mesh. Possible open mesh excision.  1) Echo next month. [facility] to approve surgery with cards.   2) Kari to approve surgery given MDS and anemia.   3) PAT for COPD baseline.          Tin Christopher MD PhD 05/22/25 2:40 PM

## 2025-05-27 ENCOUNTER — TELEPHONE (OUTPATIENT)
Dept: CARDIOLOGY | Facility: CLINIC | Age: 65
End: 2025-05-27
Payer: COMMERCIAL

## 2025-05-27 ENCOUNTER — TELEPHONE (OUTPATIENT)
Dept: SURGERY | Facility: CLINIC | Age: 65
End: 2025-05-27
Payer: COMMERCIAL

## 2025-05-27 DIAGNOSIS — Z95.2 S/P TAVR (TRANSCATHETER AORTIC VALVE REPLACEMENT): Primary | ICD-10-CM

## 2025-05-27 DIAGNOSIS — K42.9 RECURRENT UMBILICAL HERNIA: Primary | ICD-10-CM

## 2025-05-27 NOTE — TELEPHONE ENCOUNTER
Per Christiano, clearance completed and faxed to Dr. Christopher.     Called to notify pt regarding holding ASA,  spoke to wife who verbalizes understanding.    Wife asking about antibiotic prior to procedure since pt had valve surgery?    Please advise. Thanks.

## 2025-05-27 NOTE — TELEPHONE ENCOUNTER
Received faxed clearance request. Pt scheduled for ventral hernia repair, torso fascia flap creation, therapeutic abdominal cavity/implant removal on 7/7/25.    Surgeon requesting risk assessment: low, moderate, high?    Ok to hold BASA? If so, how long?    Please advise. Thanks.

## 2025-05-27 NOTE — TELEPHONE ENCOUNTER
Notified spouse that per Dr. Pierce and Dr. Christopher there has been some labs that were ordered.  Patient's wife verbalized they would be at the outpatient lab here at [facility] tomorrow to complete the order.  All questions were addressed and answered.

## 2025-05-29 ENCOUNTER — TELEPHONE (OUTPATIENT)
Dept: SURGERY | Facility: CLINIC | Age: 65
End: 2025-05-29
Payer: COMMERCIAL

## 2025-05-29 NOTE — TELEPHONE ENCOUNTER
"Per Christiano, \"Azithromycin 1 gram PO x 1 dose 1-2 hours prior to surgery\".     Called and notified Darcy who verbalizes understanding. Will pend rx.          "

## 2025-05-29 NOTE — TELEPHONE ENCOUNTER
Notified patient's wife that we received clearance back from cardiology and he is cleared for surgery at a moderate risk, and to hold his Aspirin 5 days prior to procedure, and resume post operatively.  Wife verbalized understanding and again inquired about preoperative antibiotics since patient's has had cardiac valve replacement in the past.  A secure chat message was sent to Dr. Christopher regarding this matter, and I notified wife once I get confirmation from him that I would call.  Patient's wife verbalized understanding.

## 2025-05-30 RX ORDER — AZITHROMYCIN 500 MG/1
1000 TABLET, FILM COATED ORAL ONCE
Qty: 2 TABLET | Refills: 0 | Status: SHIPPED | OUTPATIENT
Start: 2025-05-30 | End: 2025-05-30

## 2025-06-05 LAB
ALBUMIN SERPL-MCNC: 3.6 G/DL (ref 3.6–5.1)
ALP SERPL-CCNC: 85 U/L (ref 35–144)
ALT SERPL-CCNC: 18 U/L (ref 9–46)
ANION GAP SERPL CALCULATED.4IONS-SCNC: 7 MMOL/L (CALC) (ref 7–17)
APTT PPP: 27 SEC (ref 23–32)
AST SERPL-CCNC: 23 U/L (ref 10–35)
BILIRUB SERPL-MCNC: 0.6 MG/DL (ref 0.2–1.2)
BUN SERPL-MCNC: 21 MG/DL (ref 7–25)
CALCIUM SERPL-MCNC: 8.6 MG/DL (ref 8.6–10.3)
CHLORIDE SERPL-SCNC: 106 MMOL/L (ref 98–110)
CO2 SERPL-SCNC: 25 MMOL/L (ref 20–32)
CREAT SERPL-MCNC: 0.99 MG/DL (ref 0.7–1.35)
EGFRCR SERPLBLD CKD-EPI 2021: 84 ML/MIN/1.73M2
ERYTHROCYTE [DISTWIDTH] IN BLOOD BY AUTOMATED COUNT: 15.5 % (ref 11–15)
GLUCOSE SERPL-MCNC: 105 MG/DL (ref 65–99)
HCT VFR BLD AUTO: 38.7 % (ref 38.5–50)
HGB BLD-MCNC: 12.6 G/DL (ref 13.2–17.1)
INR PPP: 1
MCH RBC QN AUTO: 36.5 PG (ref 27–33)
MCHC RBC AUTO-ENTMCNC: 32.6 G/DL (ref 32–36)
MCV RBC AUTO: 112.2 FL (ref 80–100)
PLATELET # BLD AUTO: ABNORMAL THOUSAND/UL
POTASSIUM SERPL-SCNC: 4.6 MMOL/L (ref 3.5–5.3)
PROT SERPL-MCNC: 6.9 G/DL (ref 6.1–8.1)
PROTHROMBIN TIME: 10.9 SEC (ref 9–11.5)
RBC # BLD AUTO: 3.45 MILLION/UL (ref 4.2–5.8)
SODIUM SERPL-SCNC: 138 MMOL/L (ref 135–146)
WBC # BLD AUTO: 2.6 THOUSAND/UL (ref 3.8–10.8)

## 2025-06-06 DIAGNOSIS — D46.9 MYELODYSPLASTIC SYNDROME (MULTI): ICD-10-CM

## 2025-06-06 DIAGNOSIS — D61.818 PANCYTOPENIA: Primary | ICD-10-CM

## 2025-06-09 ENCOUNTER — TELEPHONE (OUTPATIENT)
Dept: HEMATOLOGY/ONCOLOGY | Facility: CLINIC | Age: 65
End: 2025-06-09
Payer: COMMERCIAL

## 2025-06-09 NOTE — TELEPHONE ENCOUNTER
Reason for Conversation  called regarding medication    Background   Patients wife called questions the Creon that was prescribed to her . Phone # [phone]    Disposition   No disposition on file.

## 2025-06-09 NOTE — TELEPHONE ENCOUNTER
Called patient and wife back and told them per Dr Pierce the creon prescription was a mistake. Dr Pierce will delete the prescription. Also, they asked Dr Pierce to review CBC, per Dr Pierce HGB is stable for surgery and advises patient to finish current supply of Iron. Patient and wife verbalize understanding, he has quite a few iron tablets left. Next OV is on 7/28.

## 2025-06-20 DIAGNOSIS — R09.81 NASAL CONGESTION: ICD-10-CM

## 2025-06-20 RX ORDER — FLUTICASONE PROPIONATE 50 MCG
2 SPRAY, SUSPENSION (ML) NASAL DAILY
Qty: 16 G | Refills: 1 | Status: SHIPPED | OUTPATIENT
Start: 2025-06-20

## 2025-06-23 ENCOUNTER — APPOINTMENT (OUTPATIENT)
Dept: PRIMARY CARE | Facility: CLINIC | Age: 65
End: 2025-06-23
Payer: COMMERCIAL

## 2025-06-23 VITALS
OXYGEN SATURATION: 94 % | DIASTOLIC BLOOD PRESSURE: 80 MMHG | WEIGHT: 255 LBS | HEIGHT: 69 IN | BODY MASS INDEX: 37.77 KG/M2 | SYSTOLIC BLOOD PRESSURE: 140 MMHG | HEART RATE: 64 BPM

## 2025-06-23 DIAGNOSIS — K43.9 HERNIA OF ABDOMINAL WALL: ICD-10-CM

## 2025-06-23 DIAGNOSIS — G89.29 CHRONIC BILATERAL LOW BACK PAIN WITHOUT SCIATICA: Chronic | ICD-10-CM

## 2025-06-23 DIAGNOSIS — M54.50 CHRONIC BILATERAL LOW BACK PAIN WITHOUT SCIATICA: Chronic | ICD-10-CM

## 2025-06-23 DIAGNOSIS — G89.4 CHRONIC PAIN SYNDROME: Primary | ICD-10-CM

## 2025-06-23 PROCEDURE — G2211 COMPLEX E/M VISIT ADD ON: HCPCS | Performed by: STUDENT IN AN ORGANIZED HEALTH CARE EDUCATION/TRAINING PROGRAM

## 2025-06-23 PROCEDURE — 3077F SYST BP >= 140 MM HG: CPT | Performed by: STUDENT IN AN ORGANIZED HEALTH CARE EDUCATION/TRAINING PROGRAM

## 2025-06-23 PROCEDURE — 99214 OFFICE O/P EST MOD 30 MIN: CPT | Performed by: STUDENT IN AN ORGANIZED HEALTH CARE EDUCATION/TRAINING PROGRAM

## 2025-06-23 PROCEDURE — 4010F ACE/ARB THERAPY RXD/TAKEN: CPT | Performed by: STUDENT IN AN ORGANIZED HEALTH CARE EDUCATION/TRAINING PROGRAM

## 2025-06-23 PROCEDURE — 1160F RVW MEDS BY RX/DR IN RCRD: CPT | Performed by: STUDENT IN AN ORGANIZED HEALTH CARE EDUCATION/TRAINING PROGRAM

## 2025-06-23 PROCEDURE — 1036F TOBACCO NON-USER: CPT | Performed by: STUDENT IN AN ORGANIZED HEALTH CARE EDUCATION/TRAINING PROGRAM

## 2025-06-23 PROCEDURE — 3079F DIAST BP 80-89 MM HG: CPT | Performed by: STUDENT IN AN ORGANIZED HEALTH CARE EDUCATION/TRAINING PROGRAM

## 2025-06-23 PROCEDURE — 3008F BODY MASS INDEX DOCD: CPT | Performed by: STUDENT IN AN ORGANIZED HEALTH CARE EDUCATION/TRAINING PROGRAM

## 2025-06-23 PROCEDURE — 1159F MED LIST DOCD IN RCRD: CPT | Performed by: STUDENT IN AN ORGANIZED HEALTH CARE EDUCATION/TRAINING PROGRAM

## 2025-06-23 RX ORDER — HYDROCODONE BITARTRATE AND ACETAMINOPHEN 5; 325 MG/1; MG/1
2 TABLET ORAL 3 TIMES DAILY PRN
Qty: 180 TABLET | Refills: 0 | Status: SHIPPED | OUTPATIENT
Start: 2025-06-23 | End: 2025-07-23

## 2025-06-23 ASSESSMENT — PATIENT HEALTH QUESTIONNAIRE - PHQ9
SUM OF ALL RESPONSES TO PHQ9 QUESTIONS 1 AND 2: 0
2. FEELING DOWN, DEPRESSED OR HOPELESS: NOT AT ALL
1. LITTLE INTEREST OR PLEASURE IN DOING THINGS: NOT AT ALL

## 2025-06-23 NOTE — PROGRESS NOTES
"Subjective   Patient ID: Jose Gaviria \"Crow\" is a 65 y.o. male who presents for Follow-up (Follow up visit. Having surgery in 2 weeks for a hernia. Has been in a lot of pain).  History of Present Illness  The patient presents for abdominal pain, constipation, weight loss, and medication management.    He is scheduled for hernia repair surgery on 07/07/2025, with an expected hospital stay of 4 to 5 days post-procedure. He has a history of umbilical hernia repair approximately 6 to 7 years ago. He reports that his abdominal pain has been progressively worsening, often disrupting his sleep. The pain was so severe one night that it persisted until 3:00 AM, leading to an episode of vomiting. He has not experienced any other episodes of vomiting. Previously, the pain would subside when he lay down, but recently it has been intensifying and only eases after several hours. He also reports occasional gas pains and burping up air. He has tried gas-reducing medications without success and takes an acid-reducing medication every morning before meals. His appetite remains good. He has gained about 10 pounds during the winter and has been advised to lose weight. He recalls gaining approximately 50 pounds after quitting smoking in 2017. He has been prescribed Trileptal, which he reports as beneficial. He is requesting a refill of his pain medication, which he obtains from [facility].    His bowel movements are irregular, sometimes normal and other times not. He was prescribed Linzess by his gastroenterologist, but found it too strong and has since returned to using MiraLAX. He notes that his bowel issues began after starting iron and pain medications.    His respiratory function remains unchanged, and he reports no new leg swelling.    He has an upcoming appointment for an echocardiogram.    PAST SURGICAL HISTORY:  - Umbilical hernia repair (6 to 7 years ago)    SOCIAL HISTORY  He quit smoking in 2017.    Review of Systems  ROS " "otherwise negative aside from what was mentioned above in HPI.    Objective     /80 (BP Location: Left arm, Patient Position: Sitting, BP Cuff Size: Large adult)   Pulse 64   Ht 1.753 m (5' 9\")   Wt 116 kg (255 lb)   SpO2 94%   BMI 37.66 kg/m²      Current Outpatient Medications   Medication Instructions    ALBUTEROL SULFATE, BULK, MISC No dose, route, or frequency recorded.    amoxicillin (Amoxil) 500 mg capsule Take 4 caps (2000 mg) 30-60mins prior to your dental appointment    aspirin 81 mg EC tablet 1 tablet, Daily    atorvastatin (LIPITOR) 20 mg, oral, Daily    ferrous sulfate 325 mg, oral, Daily, Ferrous sulfate 325 mg tablet, 1 tablet half an hour after food daily.    fluticasone (Flonase) 50 mcg/actuation nasal spray 2 sprays, Each Nostril, Daily    HYDROcodone-acetaminophen (Norco) 5-325 mg tablet 2 tablets, oral, 3 times daily PRN    linaCLOtide (LINZESS) 145 mcg, oral, Daily before breakfast, Do not crush or chew.    lipase-protease-amylase (Creon) 24,000-76,000 -120,000 unit capsule 2 capsules, oral, 3 times daily (morning, midday, late afternoon)    losartan (COZAAR) 100 mg, oral, Daily    multivitamin capsule 1 capsule, Daily    multivitamin with minerals (multivit-min-iron fum-folic ac) tablet 1 tablet, Daily    naloxone (NARCAN) 1 mg, Once    oxyCODONE-acetaminophen (Percocet) 7.5-325 mg tablet 2 times daily PRN    pantoprazole (PROTONIX) 40 mg, oral, Daily before breakfast, Do not crush, chew, or split.    spironolactone (ALDACTONE) 25 mg, oral, Daily    tiotropium (Spiriva) 18 mcg inhalation capsule 31 capsules, Daily RT    torsemide (Demadex) 20 mg tablet 1 tablet, 2 times daily       Physical Exam  Respiratory: Clear to auscultation, no wheezing, rales or rhonchi  Cardiovascular: Regular rate and rhythm, no murmurs, rubs, or gallops  Gastrointestinal: Soft, tender, large ventral hernias    Results      Assessment & Plan  1. Abdominal pain.  - Reports worsening abdominal pain that " sometimes wakes her up at night and is accompanied by vomiting.  - Physical exam reveals localized pain in the abdomen at site of multiple large hernias; heart and lungs sound good.  - Scheduled for hernia repair surgery on 07/07/2025, with an expected hospital stay of 4 to 5 days post-procedure.  - Prescription for pain medication sent to pharmacy.    2. Constipation.  - Experiences occasional constipation, attributed to iron and pain medications.  - Previously prescribed Linzess but found it too strong; currently using MiraLAX.  - Discussed bowel movement issues; sometimes requires over-the-counter medication.  - Continue MiraLAX as needed for bowel regulation.    3. Weight loss.  - Lost about 10 pounds since the last visit; current weight is 255 pounds.  - Gained weight after quitting smoking in 2017 but has been able to lose it gradually.  - Appetite remains good despite nausea; monitor weight and nutritional intake.  - Encouraged to maintain healthy eating habits to support weight loss.    4. Medication management.  -stable on his 10mg norco TID  - Prescription for pain medication sent to pharmacy.  - Confirmed all other medications are covered for 90 days.  - Continue current medication regimen and monitor for any additional needs.  -Due for CSA next visit  - Follow up if any new symptoms or medication issues arise.    OARRS report reviewed for Jose Gaviria today and is consistent with prescribed therapy.  We weighed the risks and benefit of this therapy and will continue with the current plan    Follow up in 3 months    Li Cloud,      This medical note was created with the assistance of artificial intelligence (AI) for documentation purposes. The content has been reviewed and confirmed by the healthcare provider for accuracy and completeness. Patient consented to the use of audio recording and use of AI during their visit.

## 2025-06-23 NOTE — H&P (VIEW-ONLY)
"Subjective   Patient ID: Jose Gaviria \"Crow\" is a 65 y.o. male who presents for Follow-up (Follow up visit. Having surgery in 2 weeks for a hernia. Has been in a lot of pain).  History of Present Illness  The patient presents for abdominal pain, constipation, weight loss, and medication management.    He is scheduled for hernia repair surgery on 07/07/2025, with an expected hospital stay of 4 to 5 days post-procedure. He has a history of umbilical hernia repair approximately 6 to 7 years ago. He reports that his abdominal pain has been progressively worsening, often disrupting his sleep. The pain was so severe one night that it persisted until 3:00 AM, leading to an episode of vomiting. He has not experienced any other episodes of vomiting. Previously, the pain would subside when he lay down, but recently it has been intensifying and only eases after several hours. He also reports occasional gas pains and burping up air. He has tried gas-reducing medications without success and takes an acid-reducing medication every morning before meals. His appetite remains good. He has gained about 10 pounds during the winter and has been advised to lose weight. He recalls gaining approximately 50 pounds after quitting smoking in 2017. He has been prescribed Trileptal, which he reports as beneficial. He is requesting a refill of his pain medication, which he obtains from [facility].    His bowel movements are irregular, sometimes normal and other times not. He was prescribed Linzess by his gastroenterologist, but found it too strong and has since returned to using MiraLAX. He notes that his bowel issues began after starting iron and pain medications.    His respiratory function remains unchanged, and he reports no new leg swelling.    He has an upcoming appointment for an echocardiogram.    PAST SURGICAL HISTORY:  - Umbilical hernia repair (6 to 7 years ago)    SOCIAL HISTORY  He quit smoking in 2017.    Review of Systems  ROS " "otherwise negative aside from what was mentioned above in HPI.    Objective     /80 (BP Location: Left arm, Patient Position: Sitting, BP Cuff Size: Large adult)   Pulse 64   Ht 1.753 m (5' 9\")   Wt 116 kg (255 lb)   SpO2 94%   BMI 37.66 kg/m²      Current Outpatient Medications   Medication Instructions    ALBUTEROL SULFATE, BULK, MISC No dose, route, or frequency recorded.    amoxicillin (Amoxil) 500 mg capsule Take 4 caps (2000 mg) 30-60mins prior to your dental appointment    aspirin 81 mg EC tablet 1 tablet, Daily    atorvastatin (LIPITOR) 20 mg, oral, Daily    ferrous sulfate 325 mg, oral, Daily, Ferrous sulfate 325 mg tablet, 1 tablet half an hour after food daily.    fluticasone (Flonase) 50 mcg/actuation nasal spray 2 sprays, Each Nostril, Daily    HYDROcodone-acetaminophen (Norco) 5-325 mg tablet 2 tablets, oral, 3 times daily PRN    linaCLOtide (LINZESS) 145 mcg, oral, Daily before breakfast, Do not crush or chew.    lipase-protease-amylase (Creon) 24,000-76,000 -120,000 unit capsule 2 capsules, oral, 3 times daily (morning, midday, late afternoon)    losartan (COZAAR) 100 mg, oral, Daily    multivitamin capsule 1 capsule, Daily    multivitamin with minerals (multivit-min-iron fum-folic ac) tablet 1 tablet, Daily    naloxone (NARCAN) 1 mg, Once    oxyCODONE-acetaminophen (Percocet) 7.5-325 mg tablet 2 times daily PRN    pantoprazole (PROTONIX) 40 mg, oral, Daily before breakfast, Do not crush, chew, or split.    spironolactone (ALDACTONE) 25 mg, oral, Daily    tiotropium (Spiriva) 18 mcg inhalation capsule 31 capsules, Daily RT    torsemide (Demadex) 20 mg tablet 1 tablet, 2 times daily       Physical Exam  Respiratory: Clear to auscultation, no wheezing, rales or rhonchi  Cardiovascular: Regular rate and rhythm, no murmurs, rubs, or gallops  Gastrointestinal: Soft, tender, large ventral hernias    Results      Assessment & Plan  1. Abdominal pain.  - Reports worsening abdominal pain that " sometimes wakes her up at night and is accompanied by vomiting.  - Physical exam reveals localized pain in the abdomen at site of multiple large hernias; heart and lungs sound good.  - Scheduled for hernia repair surgery on 07/07/2025, with an expected hospital stay of 4 to 5 days post-procedure.  - Prescription for pain medication sent to pharmacy.    2. Constipation.  - Experiences occasional constipation, attributed to iron and pain medications.  - Previously prescribed Linzess but found it too strong; currently using MiraLAX.  - Discussed bowel movement issues; sometimes requires over-the-counter medication.  - Continue MiraLAX as needed for bowel regulation.    3. Weight loss.  - Lost about 10 pounds since the last visit; current weight is 255 pounds.  - Gained weight after quitting smoking in 2017 but has been able to lose it gradually.  - Appetite remains good despite nausea; monitor weight and nutritional intake.  - Encouraged to maintain healthy eating habits to support weight loss.    4. Medication management.  -stable on his 10mg norco TID  - Prescription for pain medication sent to pharmacy.  - Confirmed all other medications are covered for 90 days.  - Continue current medication regimen and monitor for any additional needs.  -Due for CSA next visit  - Follow up if any new symptoms or medication issues arise.    OARRS report reviewed for Jose Gaviria today and is consistent with prescribed therapy.  We weighed the risks and benefit of this therapy and will continue with the current plan    Follow up in 3 months    Li Cloud,      This medical note was created with the assistance of artificial intelligence (AI) for documentation purposes. The content has been reviewed and confirmed by the healthcare provider for accuracy and completeness. Patient consented to the use of audio recording and use of AI during their visit.

## 2025-06-24 ENCOUNTER — HOSPITAL ENCOUNTER (OUTPATIENT)
Dept: CARDIOLOGY | Facility: CLINIC | Age: 65
Discharge: HOME | End: 2025-06-24
Payer: COMMERCIAL

## 2025-06-24 DIAGNOSIS — Z95.2 S/P TAVR (TRANSCATHETER AORTIC VALVE REPLACEMENT): ICD-10-CM

## 2025-06-24 PROCEDURE — C8929 TTE W OR WO FOL WCON,DOPPLER: HCPCS

## 2025-06-24 PROCEDURE — 93306 TTE W/DOPPLER COMPLETE: CPT | Performed by: STUDENT IN AN ORGANIZED HEALTH CARE EDUCATION/TRAINING PROGRAM

## 2025-06-24 PROCEDURE — 2500000004 HC RX 250 GENERAL PHARMACY W/ HCPCS (ALT 636 FOR OP/ED): Performed by: PHYSICIAN ASSISTANT

## 2025-06-24 RX ADMIN — PERFLUTREN 2 ML OF DILUTION: 6.52 INJECTION, SUSPENSION INTRAVENOUS at 09:55

## 2025-06-26 LAB
AORTIC VALVE MEAN GRADIENT: 8 MMHG
AORTIC VALVE PEAK VELOCITY: 2.22 M/S
AV PEAK GRADIENT: 20 MMHG
AVA (PEAK VEL): 1.68 CM2
AVA (VTI): 1.91 CM2
EJECTION FRACTION APICAL 4 CHAMBER: 68.1
EJECTION FRACTION: 58 %
LEFT ATRIUM VOLUME AREA LENGTH INDEX BSA: 34.7 ML/M2
LEFT VENTRICLE INTERNAL DIMENSION DIASTOLE: 5.82 CM (ref 3.5–6)
LEFT VENTRICULAR OUTFLOW TRACT DIAMETER: 2 CM
MITRAL VALVE E/A RATIO: 0.72
RIGHT VENTRICLE FREE WALL PEAK S': 0.08 CM/S
RIGHT VENTRICLE PEAK SYSTOLIC PRESSURE: 24 MMHG
TRICUSPID ANNULAR PLANE SYSTOLIC EXCURSION: 1.7 CM

## 2025-06-27 ENCOUNTER — PRE-ADMISSION TESTING (OUTPATIENT)
Dept: PREADMISSION TESTING | Facility: HOSPITAL | Age: 65
End: 2025-06-27
Payer: MEDICARE

## 2025-06-27 VITALS
TEMPERATURE: 95.7 F | WEIGHT: 251.32 LBS | SYSTOLIC BLOOD PRESSURE: 138 MMHG | DIASTOLIC BLOOD PRESSURE: 80 MMHG | BODY MASS INDEX: 37.22 KG/M2 | HEIGHT: 69 IN | OXYGEN SATURATION: 93 % | HEART RATE: 63 BPM | RESPIRATION RATE: 16 BRPM

## 2025-06-27 DIAGNOSIS — Z01.818 PREOP EXAMINATION: Primary | ICD-10-CM

## 2025-06-27 DIAGNOSIS — Z01.818 PREOP EXAMINATION: ICD-10-CM

## 2025-06-27 LAB
ANION GAP SERPL CALC-SCNC: 12 MMOL/L (ref 10–20)
APTT PPP: 32 SECONDS (ref 26–36)
ATRIAL RATE: 63 BPM
BUN SERPL-MCNC: 24 MG/DL (ref 6–23)
CALCIUM SERPL-MCNC: 9.1 MG/DL (ref 8.6–10.3)
CHLORIDE SERPL-SCNC: 106 MMOL/L (ref 98–107)
CO2 SERPL-SCNC: 25 MMOL/L (ref 21–32)
CREAT SERPL-MCNC: 1.12 MG/DL (ref 0.5–1.3)
EGFRCR SERPLBLD CKD-EPI 2021: 72 ML/MIN/1.73M*2
ERYTHROCYTE [DISTWIDTH] IN BLOOD BY AUTOMATED COUNT: 17 % (ref 11.5–14.5)
GLUCOSE SERPL-MCNC: 111 MG/DL (ref 74–99)
HCT VFR BLD AUTO: 43.4 % (ref 41–52)
HGB BLD-MCNC: 14.1 G/DL (ref 13.5–17.5)
INR PPP: 1.1 (ref 0.9–1.1)
MCH RBC QN AUTO: 37.1 PG (ref 26–34)
MCHC RBC AUTO-ENTMCNC: 32.5 G/DL (ref 32–36)
MCV RBC AUTO: 114 FL (ref 80–100)
NRBC BLD-RTO: 0 /100 WBCS (ref 0–0)
P AXIS: 101 DEGREES
P OFFSET: 151 MS
P ONSET: 116 MS
PLATELET # BLD AUTO: 130 X10*3/UL (ref 150–450)
POTASSIUM SERPL-SCNC: 5 MMOL/L (ref 3.5–5.3)
PR INTERVAL: 190 MS
PROTHROMBIN TIME: 12.1 SECONDS (ref 9.8–12.4)
Q ONSET: 211 MS
QRS COUNT: 11 BEATS
QRS DURATION: 132 MS
QT INTERVAL: 432 MS
QTC CALCULATION(BAZETT): 442 MS
QTC FREDERICIA: 439 MS
R AXIS: -7 DEGREES
RBC # BLD AUTO: 3.8 X10*6/UL (ref 4.5–5.9)
SODIUM SERPL-SCNC: 138 MMOL/L (ref 136–145)
T AXIS: 117 DEGREES
T OFFSET: 427 MS
VENTRICULAR RATE: 63 BPM
WBC # BLD AUTO: 3.5 X10*3/UL (ref 4.4–11.3)

## 2025-06-27 PROCEDURE — 85610 PROTHROMBIN TIME: CPT

## 2025-06-27 PROCEDURE — 93005 ELECTROCARDIOGRAM TRACING: CPT

## 2025-06-27 PROCEDURE — 36415 COLL VENOUS BLD VENIPUNCTURE: CPT

## 2025-06-27 PROCEDURE — 85027 COMPLETE CBC AUTOMATED: CPT

## 2025-06-27 PROCEDURE — 80048 BASIC METABOLIC PNL TOTAL CA: CPT

## 2025-06-27 PROCEDURE — 93010 ELECTROCARDIOGRAM REPORT: CPT | Performed by: STUDENT IN AN ORGANIZED HEALTH CARE EDUCATION/TRAINING PROGRAM

## 2025-06-27 ASSESSMENT — DUKE ACTIVITY SCORE INDEX (DASI)
CAN YOU HAVE SEXUAL RELATIONS: NO
CAN YOU WALK A BLOCK OR TWO ON LEVEL GROUND: NO
CAN YOU DO HEAVY WORK AROUND THE HOUSE LIKE SCRUBBING FLOORS OR LIFTING AND MOVING HEAVY FURNITURE: NO
CAN YOU DO LIGHT WORK AROUND THE HOUSE LIKE DUSTING OR WASHING DISHES: NO
CAN YOU CLIMB A FLIGHT OF STAIRS OR WALK UP A HILL: YES
CAN YOU RUN A SHORT DISTANCE: YES
CAN YOU PARTICIPATE IN STRENOUS SPORTS LIKE SWIMMING, SINGLES TENNIS, FOOTBALL, BASKETBALL, OR SKIING: NO
CAN YOU DO YARD WORK LIKE RAKING LEAVES, WEEDING OR PUSHING A MOWER: NO
CAN YOU PARTICIPATE IN MODERATE RECREATIONAL ACTIVITIES LIKE GOLF, BOWLING, DANCING, DOUBLES TENNIS OR THROWING A BASEBALL OR FOOTBALL: NO
CAN YOU TAKE CARE OF YOURSELF (EAT, DRESS, BATHE, OR USE TOILET): YES
CAN YOU WALK INDOORS, SUCH AS AROUND YOUR HOUSE: YES

## 2025-06-27 NOTE — PREPROCEDURE INSTRUCTIONS
Medication List            Accurate as of June 27, 2025  8:53 AM. Always use your most recent med list.                ALBUTEROL SULFATE (BULK) MISC  Medication Adjustments for Surgery: Take/Use as prescribed     amoxicillin 500 mg capsule  Commonly known as: Amoxil  Take 4 caps (2000 mg) 30-60mins prior to your dental appointment  Medication Adjustments for Surgery: Take/Use as prescribed     aspirin 81 mg EC tablet  Additional Medication Adjustments for Surgery: Take last dose 5 days before surgery  Notes to patient: Last day to take is 7/1 then hold until after surgery, as directed!     atorvastatin 20 mg tablet  Commonly known as: Lipitor  TAKE 1 TABLET DAILY  Medication Adjustments for Surgery: Take/Use as prescribed     ferrous sulfate 325 (65 Fe) mg EC tablet  Take 1 tablet (325 mg) by mouth once daily. Ferrous sulfate 325 mg tablet, 1 tablet half an hour after food daily.  Additional Medication Adjustments for Surgery: Take last dose 7 days before surgery  Notes to patient: Last day to take is 6/29 then hold until after surgery     fluticasone 50 mcg/actuation nasal spray  Commonly known as: Flonase  Administer 2 sprays into each nostril once daily.  Medication Adjustments for Surgery: Take/Use as prescribed     HYDROcodone-acetaminophen 5-325 mg tablet  Commonly known as: Norco  Take 2 tablets by mouth 3 times a day as needed for severe pain (7 - 10).  Medication Adjustments for Surgery: Take/Use as prescribed     linaCLOtide 145 mcg capsule  Commonly known as: Linzess  Take 1 capsule (145 mcg) by mouth once daily in the morning. Take before meals. Do not crush or chew.  Medication Adjustments for Surgery: Do Not take on the morning of surgery     lipase-protease-amylase 24,000-76,000 -120,000 unit capsule  Commonly known as: Creon  Take 2 capsules by mouth 3 times daily (morning, midday, late afternoon).  Medication Adjustments for Surgery: Take/Use as prescribed     losartan 100 mg tablet  Commonly  known as: Cozaar  TAKE 1 TABLET DAILY  Medication Adjustments for Surgery: Take last dose 1 day (24 hours) before surgery  Notes to patient: Hold the night before and the day of surgery= Hold x 24 hours     multivitamin capsule  Additional Medication Adjustments for Surgery: Take last dose 7 days before surgery  Notes to patient: Last day to take is 6/29 then hold until after surgery     multivitamin with minerals tablet  Additional Medication Adjustments for Surgery: Take last dose 7 days before surgery  Notes to patient: Last day to take is 6/29 then hold until after surgery     Narcan 4 mg/0.1 mL nasal spray  Generic drug: naloxone  Medication Adjustments for Surgery: Take/Use as prescribed     oxyCODONE-acetaminophen 7.5-325 mg tablet  Commonly known as: Percocet  Medication Adjustments for Surgery: Take/Use as prescribed     pantoprazole 40 mg EC tablet  Commonly known as: ProtoNix  Take 1 tablet (40 mg) by mouth once daily in the morning. Take before meals. Do not crush, chew, or split.  Medication Adjustments for Surgery: Do Not take on the morning of surgery     spironolactone 25 mg tablet  Commonly known as: Aldactone  TAKE 1 TABLET DAILY  Medication Adjustments for Surgery: Take on the morning of surgery     tiotropium 18 mcg inhalation capsule  Commonly known as: Spiriva  Medication Adjustments for Surgery: Take/Use as prescribed     torsemide 20 mg tablet  Commonly known as: Demadex  Medication Adjustments for Surgery: Do Not take on the morning of surgery                          PRE-OPERATIVE INSTRUCTIONS FOR SURGERY    *Do not eat anything after midnight the night of surgery.  This includes food of any kind (including hard candy, cough drops, mints).     You may have up to 13 ounces of clear liquid until TWO hours prior to your scheduled arrival time  Clear liquids include water, black tea/coffee, (no milk or cream) apple juice and electrolyte drinks (GATORADE).  You may chew gum until TWO hours prior  you your surgery/procedure.         -----------    *One of our staff members will call you ONE business day before your surgery, between 11am-2 pm to let you know the time to arrive.  If you have not received a call by 2 pm, call [phone]    *When you arrive at the hospital-->GO TO Registration on the ground floor  *Stop smoking 24 hours prior to surgery.  No Marijuana, CBD Oil or Vaping for 48 hours  *No alcohol 24 hours prior to surgery  *You will need a responsible adult to drive you home  -No acrylic nails or nail polish on at least one fingernail, NO polish on toes for foot surgery  -You may be asked to remove your dentures, partial plate, eyeglasses or contact lenses before going to surgery.  Please bring a case for these items.  -Body piercings need to be removed.  Jewelry and valuables should be left at home.  -Put on loose,  comfortable, clean clothing, that will accommodate bandages    *If you have any further questions about your pre-op instructions,  not mentioned in this handout, then call [phone]*    What you may be asked to bring to surgery:  ___Crutches, walker  ___CPAP machine  ___Urine specimen

## 2025-06-30 LAB
ATRIAL RATE: 63 BPM
P AXIS: 101 DEGREES
P OFFSET: 151 MS
P ONSET: 116 MS
PR INTERVAL: 190 MS
Q ONSET: 211 MS
QRS COUNT: 11 BEATS
QRS DURATION: 132 MS
QT INTERVAL: 432 MS
QTC CALCULATION(BAZETT): 442 MS
QTC FREDERICIA: 439 MS
R AXIS: -7 DEGREES
T AXIS: 117 DEGREES
T OFFSET: 427 MS
VENTRICULAR RATE: 63 BPM

## 2025-07-01 ENCOUNTER — DOCUMENTATION (OUTPATIENT)
Dept: HEMATOLOGY/ONCOLOGY | Facility: CLINIC | Age: 65
End: 2025-07-01
Payer: COMMERCIAL

## 2025-07-01 NOTE — PROGRESS NOTES
Surgical clearance note written out, signed by Dr Pierce and faxed over to Dr. Christopher's office, clearing patient for his upcoming 7.7.2025 surgery.

## 2025-07-07 ENCOUNTER — HOSPITAL ENCOUNTER (INPATIENT)
Facility: HOSPITAL | Age: 65
LOS: 6 days | Discharge: HOME | DRG: 353 | End: 2025-07-13
Attending: SURGERY | Admitting: SURGERY
Payer: COMMERCIAL

## 2025-07-07 ENCOUNTER — ANESTHESIA EVENT (OUTPATIENT)
Dept: OPERATING ROOM | Facility: HOSPITAL | Age: 65
End: 2025-07-07
Payer: COMMERCIAL

## 2025-07-07 ENCOUNTER — ANESTHESIA (OUTPATIENT)
Dept: OPERATING ROOM | Facility: HOSPITAL | Age: 65
End: 2025-07-07
Payer: COMMERCIAL

## 2025-07-07 DIAGNOSIS — K42.9 RECURRENT UMBILICAL HERNIA: ICD-10-CM

## 2025-07-07 DIAGNOSIS — K43.9 VENTRAL HERNIA WITHOUT OBSTRUCTION OR GANGRENE: ICD-10-CM

## 2025-07-07 DIAGNOSIS — K42.9 UMBILICAL HERNIA WITHOUT OBSTRUCTION AND WITHOUT GANGRENE: Primary | ICD-10-CM

## 2025-07-07 LAB
ABO GROUP (TYPE) IN BLOOD: NORMAL
ABO GROUP (TYPE) IN BLOOD: NORMAL
ANTIBODY SCREEN: NORMAL
APTT PPP: 28 SECONDS (ref 26–36)
BLOOD EXPIRATION DATE: NORMAL
DISPENSE STATUS: NORMAL
ERYTHROCYTE [DISTWIDTH] IN BLOOD BY AUTOMATED COUNT: 16.7 % (ref 11.5–14.5)
ERYTHROCYTE [DISTWIDTH] IN BLOOD BY AUTOMATED COUNT: 19.5 % (ref 11.5–14.5)
GLUCOSE BLD MANUAL STRIP-MCNC: 107 MG/DL (ref 74–99)
GLUCOSE BLD MANUAL STRIP-MCNC: 146 MG/DL (ref 74–99)
GLUCOSE BLD MANUAL STRIP-MCNC: 209 MG/DL (ref 74–99)
HCT VFR BLD AUTO: 34.5 % (ref 41–52)
HCT VFR BLD AUTO: 36.1 % (ref 41–52)
HGB BLD-MCNC: 10.9 G/DL (ref 13.5–17.5)
HGB BLD-MCNC: 11.5 G/DL (ref 13.5–17.5)
INR PPP: 1.1 (ref 0.9–1.1)
LACTATE SERPL-SCNC: 3.2 MMOL/L (ref 0.4–2)
MCH RBC QN AUTO: 34.9 PG (ref 26–34)
MCH RBC QN AUTO: 36.9 PG (ref 26–34)
MCHC RBC AUTO-ENTMCNC: 31.6 G/DL (ref 32–36)
MCHC RBC AUTO-ENTMCNC: 31.9 G/DL (ref 32–36)
MCV RBC AUTO: 111 FL (ref 80–100)
MCV RBC AUTO: 116 FL (ref 80–100)
NRBC BLD-RTO: 0 /100 WBCS (ref 0–0)
NRBC BLD-RTO: 0 /100 WBCS (ref 0–0)
PLATELET # BLD AUTO: 122 X10*3/UL (ref 150–450)
PLATELET # BLD AUTO: 182 X10*3/UL (ref 150–450)
POCT INTERNATIONAL NORMALIZATION RATIO: 1.1
POCT PROTHROMBIN TIME: 12.6 SECONDS
PRODUCT BLOOD TYPE: 5100
PRODUCT CODE: NORMAL
PROTHROMBIN TIME: 12.7 SECONDS (ref 9.8–12.4)
RBC # BLD AUTO: 3.12 X10*6/UL (ref 4.5–5.9)
RBC # BLD AUTO: 3.12 X10*6/UL (ref 4.5–5.9)
RH FACTOR (ANTIGEN D): NORMAL
RH FACTOR (ANTIGEN D): NORMAL
UNIT ABO: NORMAL
UNIT NUMBER: NORMAL
UNIT RH: NORMAL
UNIT VOLUME: 350
WBC # BLD AUTO: 5.1 X10*3/UL (ref 4.4–11.3)
WBC # BLD AUTO: 6.7 X10*3/UL (ref 4.4–11.3)
XM INTEP: NORMAL

## 2025-07-07 PROCEDURE — 2500000001 HC RX 250 WO HCPCS SELF ADMINISTERED DRUGS (ALT 637 FOR MEDICARE OP)

## 2025-07-07 PROCEDURE — P9016 RBC LEUKOCYTES REDUCED: HCPCS

## 2025-07-07 PROCEDURE — 3600000008 HC OR TIME - EACH INCREMENTAL 1 MINUTE - PROCEDURE LEVEL THREE: Performed by: SURGERY

## 2025-07-07 PROCEDURE — 93005 ELECTROCARDIOGRAM TRACING: CPT

## 2025-07-07 PROCEDURE — 2500000004 HC RX 250 GENERAL PHARMACY W/ HCPCS (ALT 636 FOR OP/ED): Performed by: SURGERY

## 2025-07-07 PROCEDURE — 2500000004 HC RX 250 GENERAL PHARMACY W/ HCPCS (ALT 636 FOR OP/ED): Performed by: NURSE PRACTITIONER

## 2025-07-07 PROCEDURE — 2720000007 HC OR 272 NO HCPCS: Performed by: SURGERY

## 2025-07-07 PROCEDURE — 3700000001 HC GENERAL ANESTHESIA TIME - INITIAL BASE CHARGE: Performed by: SURGERY

## 2025-07-07 PROCEDURE — 2780000003 HC OR 278 NO HCPCS: Performed by: SURGERY

## 2025-07-07 PROCEDURE — A49618 PR RPR AA HERNIA RECR > 10 CM NCRC8/STRANGULATED: Performed by: NURSE ANESTHETIST, CERTIFIED REGISTERED

## 2025-07-07 PROCEDURE — C1889 IMPLANT/INSERT DEVICE, NOC: HCPCS | Performed by: SURGERY

## 2025-07-07 PROCEDURE — 2020000001 HC ICU ROOM DAILY

## 2025-07-07 PROCEDURE — 0751T DGTZ GLS MCRSCP SLD LEVEL II: CPT | Mod: TC,PARLAB | Performed by: SURGERY

## 2025-07-07 PROCEDURE — 49623 RMVL NINFCT MESH HERNIA RPR: CPT | Performed by: SURGERY

## 2025-07-07 PROCEDURE — A49618 PR RPR AA HERNIA RECR > 10 CM NCRC8/STRANGULATED: Performed by: ANESTHESIOLOGY

## 2025-07-07 PROCEDURE — 85610 PROTHROMBIN TIME: CPT

## 2025-07-07 PROCEDURE — 3700000002 HC GENERAL ANESTHESIA TIME - EACH INCREMENTAL 1 MINUTE: Performed by: SURGERY

## 2025-07-07 PROCEDURE — 36415 COLL VENOUS BLD VENIPUNCTURE: CPT | Performed by: SURGERY

## 2025-07-07 PROCEDURE — 2500000005 HC RX 250 GENERAL PHARMACY W/O HCPCS

## 2025-07-07 PROCEDURE — 83605 ASSAY OF LACTIC ACID: CPT

## 2025-07-07 PROCEDURE — 86850 RBC ANTIBODY SCREEN: CPT | Performed by: SURGERY

## 2025-07-07 PROCEDURE — 99291 CRITICAL CARE FIRST HOUR: CPT

## 2025-07-07 PROCEDURE — 86923 COMPATIBILITY TEST ELECTRIC: CPT

## 2025-07-07 PROCEDURE — 7100000001 HC RECOVERY ROOM TIME - INITIAL BASE CHARGE: Performed by: SURGERY

## 2025-07-07 PROCEDURE — 88302 TISSUE EXAM BY PATHOLOGIST: CPT | Performed by: PATHOLOGY

## 2025-07-07 PROCEDURE — 49618 RPR AA HRN RCR > 10 NCR/STRN: CPT | Performed by: SURGERY

## 2025-07-07 PROCEDURE — 2500000004 HC RX 250 GENERAL PHARMACY W/ HCPCS (ALT 636 FOR OP/ED)

## 2025-07-07 PROCEDURE — 7100000002 HC RECOVERY ROOM TIME - EACH INCREMENTAL 1 MINUTE: Performed by: SURGERY

## 2025-07-07 PROCEDURE — 36430 TRANSFUSION BLD/BLD COMPNT: CPT

## 2025-07-07 PROCEDURE — 85027 COMPLETE CBC AUTOMATED: CPT | Performed by: SURGERY

## 2025-07-07 PROCEDURE — 30233R1 TRANSFUSION OF NONAUTOLOGOUS PLATELETS INTO PERIPHERAL VEIN, PERCUTANEOUS APPROACH: ICD-10-PCS | Performed by: SURGERY

## 2025-07-07 PROCEDURE — 85027 COMPLETE CBC AUTOMATED: CPT | Performed by: NURSE PRACTITIONER

## 2025-07-07 PROCEDURE — 93010 ELECTROCARDIOGRAM REPORT: CPT | Performed by: STUDENT IN AN ORGANIZED HEALTH CARE EDUCATION/TRAINING PROGRAM

## 2025-07-07 PROCEDURE — 0WUF0JZ SUPPLEMENT ABDOMINAL WALL WITH SYNTHETIC SUBSTITUTE, OPEN APPROACH: ICD-10-PCS | Performed by: SURGERY

## 2025-07-07 PROCEDURE — 2500000004 HC RX 250 GENERAL PHARMACY W/ HCPCS (ALT 636 FOR OP/ED): Performed by: NURSE ANESTHETIST, CERTIFIED REGISTERED

## 2025-07-07 PROCEDURE — 82947 ASSAY GLUCOSE BLOOD QUANT: CPT

## 2025-07-07 PROCEDURE — 2500000001 HC RX 250 WO HCPCS SELF ADMINISTERED DRUGS (ALT 637 FOR MEDICARE OP): Performed by: SURGERY

## 2025-07-07 PROCEDURE — 13102 CMPLX RPR TRUNK ADDL 5CM/<: CPT | Performed by: SURGERY

## 2025-07-07 PROCEDURE — 3600000003 HC OR TIME - INITIAL BASE CHARGE - PROCEDURE LEVEL THREE: Performed by: SURGERY

## 2025-07-07 PROCEDURE — 13101 CMPLX RPR TRUNK 2.6-7.5 CM: CPT | Performed by: SURGERY

## 2025-07-07 DEVICE — GORE SYNECOR PREPERITONEAL 20CMX30CMRECTANGLE BIOMATERIAL
Type: IMPLANTABLE DEVICE | Site: ABDOMEN | Status: FUNCTIONAL
Brand: GORE SYNECOR PREPERITONEAL BIOMATERIAL

## 2025-07-07 RX ORDER — TRANEXAMIC ACID 10 MG/ML
1000 INJECTION, SOLUTION INTRAVENOUS EVERY 6 HOURS
Status: DISCONTINUED | OUTPATIENT
Start: 2025-07-07 | End: 2025-07-07

## 2025-07-07 RX ORDER — HEPARIN SODIUM 5000 [USP'U]/ML
INJECTION, SOLUTION INTRAVENOUS; SUBCUTANEOUS
Status: COMPLETED
Start: 2025-07-07 | End: 2025-07-07

## 2025-07-07 RX ORDER — SODIUM CHLORIDE, SODIUM LACTATE, POTASSIUM CHLORIDE, CALCIUM CHLORIDE 600; 310; 30; 20 MG/100ML; MG/100ML; MG/100ML; MG/100ML
100 INJECTION, SOLUTION INTRAVENOUS CONTINUOUS
Status: DISCONTINUED | OUTPATIENT
Start: 2025-07-07 | End: 2025-07-07 | Stop reason: HOSPADM

## 2025-07-07 RX ORDER — KETOROLAC TROMETHAMINE 30 MG/ML
15 INJECTION, SOLUTION INTRAMUSCULAR; INTRAVENOUS EVERY 6 HOURS
Status: DISCONTINUED | OUTPATIENT
Start: 2025-07-07 | End: 2025-07-11

## 2025-07-07 RX ORDER — TRANEXAMIC ACID 10 MG/ML
1000 INJECTION, SOLUTION INTRAVENOUS EVERY 6 HOURS
Status: COMPLETED | OUTPATIENT
Start: 2025-07-07 | End: 2025-07-08

## 2025-07-07 RX ORDER — LABETALOL HYDROCHLORIDE 5 MG/ML
10 INJECTION, SOLUTION INTRAVENOUS ONCE AS NEEDED
Status: DISCONTINUED | OUTPATIENT
Start: 2025-07-07 | End: 2025-07-07

## 2025-07-07 RX ORDER — CEFAZOLIN SODIUM 2 G/50ML
2 SOLUTION INTRAVENOUS ONCE
Status: COMPLETED | OUTPATIENT
Start: 2025-07-07 | End: 2025-07-07

## 2025-07-07 RX ORDER — IPRATROPIUM BROMIDE AND ALBUTEROL SULFATE 2.5; .5 MG/3ML; MG/3ML
3 SOLUTION RESPIRATORY (INHALATION) EVERY 4 HOURS PRN
Status: DISCONTINUED | OUTPATIENT
Start: 2025-07-07 | End: 2025-07-08

## 2025-07-07 RX ORDER — GABAPENTIN 300 MG/1
300 CAPSULE ORAL 3 TIMES DAILY
Status: DISCONTINUED | OUTPATIENT
Start: 2025-07-07 | End: 2025-07-13 | Stop reason: HOSPADM

## 2025-07-07 RX ORDER — OXYCODONE HYDROCHLORIDE 5 MG/1
5 TABLET ORAL EVERY 6 HOURS PRN
Status: DISCONTINUED | OUTPATIENT
Start: 2025-07-07 | End: 2025-07-08

## 2025-07-07 RX ORDER — ACETAMINOPHEN 325 MG/1
TABLET ORAL
Status: COMPLETED
Start: 2025-07-07 | End: 2025-07-07

## 2025-07-07 RX ORDER — SPIRONOLACTONE 25 MG/1
25 TABLET ORAL DAILY
Status: DISCONTINUED | OUTPATIENT
Start: 2025-07-08 | End: 2025-07-13 | Stop reason: HOSPADM

## 2025-07-07 RX ORDER — HYDROMORPHONE HYDROCHLORIDE 1 MG/ML
1 INJECTION, SOLUTION INTRAMUSCULAR; INTRAVENOUS; SUBCUTANEOUS EVERY 5 MIN PRN
Status: DISCONTINUED | OUTPATIENT
Start: 2025-07-07 | End: 2025-07-07

## 2025-07-07 RX ORDER — CELECOXIB 100 MG/1
CAPSULE ORAL
Status: COMPLETED
Start: 2025-07-07 | End: 2025-07-07

## 2025-07-07 RX ORDER — ACETAMINOPHEN 650 MG/1
650 SUPPOSITORY RECTAL EVERY 6 HOURS
Status: DISCONTINUED | OUTPATIENT
Start: 2025-07-07 | End: 2025-07-08

## 2025-07-07 RX ORDER — ACETAMINOPHEN 325 MG/1
975 TABLET ORAL ONCE
Status: COMPLETED | OUTPATIENT
Start: 2025-07-07 | End: 2025-07-07

## 2025-07-07 RX ORDER — ACETAMINOPHEN 160 MG/5ML
650 SOLUTION ORAL EVERY 6 HOURS
Status: DISCONTINUED | OUTPATIENT
Start: 2025-07-07 | End: 2025-07-08

## 2025-07-07 RX ORDER — SCOPOLAMINE 1 MG/3D
PATCH, EXTENDED RELEASE TRANSDERMAL
Status: COMPLETED
Start: 2025-07-07 | End: 2025-07-10

## 2025-07-07 RX ORDER — GABAPENTIN 300 MG/1
CAPSULE ORAL
Status: COMPLETED
Start: 2025-07-07 | End: 2025-07-07

## 2025-07-07 RX ORDER — HYDRALAZINE HYDROCHLORIDE 20 MG/ML
10 INJECTION INTRAMUSCULAR; INTRAVENOUS EVERY 30 MIN PRN
Status: DISCONTINUED | OUTPATIENT
Start: 2025-07-07 | End: 2025-07-07

## 2025-07-07 RX ORDER — ACETAMINOPHEN 325 MG/1
650 TABLET ORAL EVERY 4 HOURS PRN
Status: DISCONTINUED | OUTPATIENT
Start: 2025-07-07 | End: 2025-07-07

## 2025-07-07 RX ORDER — DEXTROSE MONOHYDRATE AND SODIUM CHLORIDE 5; .45 G/100ML; G/100ML
100 INJECTION, SOLUTION INTRAVENOUS CONTINUOUS
Status: ACTIVE | OUTPATIENT
Start: 2025-07-07 | End: 2025-07-08

## 2025-07-07 RX ORDER — ROCURONIUM BROMIDE 10 MG/ML
INJECTION, SOLUTION INTRAVENOUS AS NEEDED
Status: DISCONTINUED | OUTPATIENT
Start: 2025-07-07 | End: 2025-07-07

## 2025-07-07 RX ORDER — NALOXONE HYDROCHLORIDE 1 MG/ML
0.2 INJECTION INTRAMUSCULAR; INTRAVENOUS; SUBCUTANEOUS EVERY 5 MIN PRN
Status: DISCONTINUED | OUTPATIENT
Start: 2025-07-07 | End: 2025-07-13 | Stop reason: HOSPADM

## 2025-07-07 RX ORDER — GABAPENTIN 300 MG/1
600 CAPSULE ORAL ONCE
Status: COMPLETED | OUTPATIENT
Start: 2025-07-07 | End: 2025-07-07

## 2025-07-07 RX ORDER — FENTANYL CITRATE 50 UG/ML
INJECTION, SOLUTION INTRAMUSCULAR; INTRAVENOUS AS NEEDED
Status: DISCONTINUED | OUTPATIENT
Start: 2025-07-07 | End: 2025-07-07

## 2025-07-07 RX ORDER — CELECOXIB 100 MG/1
400 CAPSULE ORAL ONCE
Status: COMPLETED | OUTPATIENT
Start: 2025-07-07 | End: 2025-07-07

## 2025-07-07 RX ORDER — ONDANSETRON HYDROCHLORIDE 2 MG/ML
4 INJECTION, SOLUTION INTRAVENOUS ONCE AS NEEDED
Status: DISCONTINUED | OUTPATIENT
Start: 2025-07-07 | End: 2025-07-07

## 2025-07-07 RX ORDER — ENOXAPARIN SODIUM 100 MG/ML
40 INJECTION SUBCUTANEOUS EVERY 24 HOURS
Status: DISCONTINUED | OUTPATIENT
Start: 2025-07-07 | End: 2025-07-13 | Stop reason: HOSPADM

## 2025-07-07 RX ORDER — PHENYLEPHRINE HCL IN 0.9% NACL 1 MG/10 ML
SYRINGE (ML) INTRAVENOUS AS NEEDED
Status: DISCONTINUED | OUTPATIENT
Start: 2025-07-07 | End: 2025-07-07

## 2025-07-07 RX ORDER — ONDANSETRON HYDROCHLORIDE 2 MG/ML
INJECTION, SOLUTION INTRAVENOUS AS NEEDED
Status: DISCONTINUED | OUTPATIENT
Start: 2025-07-07 | End: 2025-07-07

## 2025-07-07 RX ORDER — DOCUSATE SODIUM 100 MG/1
100 CAPSULE, LIQUID FILLED ORAL 2 TIMES DAILY
Status: DISCONTINUED | OUTPATIENT
Start: 2025-07-07 | End: 2025-07-11

## 2025-07-07 RX ORDER — IBUPROFEN 400 MG/1
400 TABLET, FILM COATED ORAL EVERY 8 HOURS PRN
Status: DISCONTINUED | OUTPATIENT
Start: 2025-07-07 | End: 2025-07-08

## 2025-07-07 RX ORDER — PANTOPRAZOLE SODIUM 40 MG/1
40 TABLET, DELAYED RELEASE ORAL
Status: DISCONTINUED | OUTPATIENT
Start: 2025-07-08 | End: 2025-07-13 | Stop reason: HOSPADM

## 2025-07-07 RX ORDER — LOSARTAN POTASSIUM 50 MG/1
100 TABLET ORAL DAILY
Status: DISCONTINUED | OUTPATIENT
Start: 2025-07-07 | End: 2025-07-13 | Stop reason: HOSPADM

## 2025-07-07 RX ORDER — DIPHENHYDRAMINE HYDROCHLORIDE 50 MG/ML
12.5 INJECTION, SOLUTION INTRAMUSCULAR; INTRAVENOUS ONCE AS NEEDED
Status: DISCONTINUED | OUTPATIENT
Start: 2025-07-07 | End: 2025-07-07

## 2025-07-07 RX ORDER — SCOPOLAMINE 1 MG/3D
1 PATCH, EXTENDED RELEASE TRANSDERMAL
Status: DISCONTINUED | OUTPATIENT
Start: 2025-07-07 | End: 2025-07-07

## 2025-07-07 RX ORDER — ACETAMINOPHEN 325 MG/1
650 TABLET ORAL EVERY 6 HOURS
Status: DISCONTINUED | OUTPATIENT
Start: 2025-07-07 | End: 2025-07-08

## 2025-07-07 RX ORDER — ATORVASTATIN CALCIUM 20 MG/1
20 TABLET, FILM COATED ORAL DAILY
Status: DISCONTINUED | OUTPATIENT
Start: 2025-07-07 | End: 2025-07-13 | Stop reason: HOSPADM

## 2025-07-07 RX ORDER — TRANEXAMIC ACID 1 G/10ML
INJECTION, SOLUTION INTRAVENOUS ONCE
Status: CANCELLED | OUTPATIENT
Start: 2025-07-07 | End: 2025-07-07

## 2025-07-07 RX ORDER — HYDROMORPHONE HYDROCHLORIDE 1 MG/ML
1 INJECTION, SOLUTION INTRAMUSCULAR; INTRAVENOUS; SUBCUTANEOUS EVERY 4 HOURS PRN
Status: DISCONTINUED | OUTPATIENT
Start: 2025-07-07 | End: 2025-07-08

## 2025-07-07 RX ORDER — PROPOFOL 10 MG/ML
INJECTION, EMULSION INTRAVENOUS AS NEEDED
Status: DISCONTINUED | OUTPATIENT
Start: 2025-07-07 | End: 2025-07-07

## 2025-07-07 RX ORDER — LIDOCAINE HCL/PF 100 MG/5ML
SYRINGE (ML) INTRAVENOUS AS NEEDED
Status: DISCONTINUED | OUTPATIENT
Start: 2025-07-07 | End: 2025-07-07

## 2025-07-07 RX ORDER — ONDANSETRON 4 MG/1
4 TABLET, ORALLY DISINTEGRATING ORAL EVERY 8 HOURS PRN
Status: DISCONTINUED | OUTPATIENT
Start: 2025-07-07 | End: 2025-07-13 | Stop reason: HOSPADM

## 2025-07-07 RX ORDER — ONDANSETRON HYDROCHLORIDE 2 MG/ML
4 INJECTION, SOLUTION INTRAVENOUS EVERY 8 HOURS PRN
Status: DISCONTINUED | OUTPATIENT
Start: 2025-07-07 | End: 2025-07-13 | Stop reason: HOSPADM

## 2025-07-07 RX ORDER — HEPARIN SODIUM 5000 [USP'U]/ML
5000 INJECTION, SOLUTION INTRAVENOUS; SUBCUTANEOUS ONCE
Status: COMPLETED | OUTPATIENT
Start: 2025-07-07 | End: 2025-07-07

## 2025-07-07 RX ADMIN — ONDANSETRON 4 MG: 2 INJECTION INTRAMUSCULAR; INTRAVENOUS at 14:37

## 2025-07-07 RX ADMIN — FENTANYL CITRATE 50 MCG: 50 INJECTION, SOLUTION INTRAMUSCULAR; INTRAVENOUS at 13:57

## 2025-07-07 RX ADMIN — DEXTROSE AND SODIUM CHLORIDE 100 ML/HR: 5; 450 INJECTION, SOLUTION INTRAVENOUS at 19:13

## 2025-07-07 RX ADMIN — SODIUM CHLORIDE, POTASSIUM CHLORIDE, SODIUM LACTATE AND CALCIUM CHLORIDE: 600; 310; 30; 20 INJECTION, SOLUTION INTRAVENOUS at 13:17

## 2025-07-07 RX ADMIN — CEFAZOLIN SODIUM 2 G: 2 SOLUTION INTRAVENOUS at 13:28

## 2025-07-07 RX ADMIN — FENTANYL CITRATE 50 MCG: 50 INJECTION, SOLUTION INTRAMUSCULAR; INTRAVENOUS at 13:51

## 2025-07-07 RX ADMIN — KETOROLAC TROMETHAMINE 15 MG: 30 INJECTION, SOLUTION INTRAMUSCULAR at 19:12

## 2025-07-07 RX ADMIN — HEPARIN SODIUM 5000 UNITS: 5000 INJECTION, SOLUTION INTRAVENOUS; SUBCUTANEOUS at 12:15

## 2025-07-07 RX ADMIN — HYDROMORPHONE HYDROCHLORIDE 0.5 MG: 2 INJECTION, SOLUTION INTRAMUSCULAR; INTRAVENOUS; SUBCUTANEOUS at 16:06

## 2025-07-07 RX ADMIN — GABAPENTIN 600 MG: 300 CAPSULE ORAL at 12:17

## 2025-07-07 RX ADMIN — LIDOCAINE HYDROCHLORIDE 50 MG: 20 INJECTION INTRAVENOUS at 13:24

## 2025-07-07 RX ADMIN — PROPOFOL 50 MG: 10 INJECTION, EMULSION INTRAVENOUS at 14:01

## 2025-07-07 RX ADMIN — Medication 100 MCG: at 15:53

## 2025-07-07 RX ADMIN — PROPOFOL 150 MG: 10 INJECTION, EMULSION INTRAVENOUS at 13:24

## 2025-07-07 RX ADMIN — DOCUSATE SODIUM 100 MG: 100 CAPSULE, LIQUID FILLED ORAL at 21:27

## 2025-07-07 RX ADMIN — CELECOXIB 400 MG: 100 CAPSULE ORAL at 12:16

## 2025-07-07 RX ADMIN — ATORVASTATIN CALCIUM 20 MG: 20 TABLET, FILM COATED ORAL at 19:12

## 2025-07-07 RX ADMIN — SODIUM CHLORIDE, SODIUM LACTATE, POTASSIUM CHLORIDE, AND CALCIUM CHLORIDE 1000 ML: .6; .31; .03; .02 INJECTION, SOLUTION INTRAVENOUS at 19:23

## 2025-07-07 RX ADMIN — HYDROMORPHONE HYDROCHLORIDE 0.5 MG: 2 INJECTION, SOLUTION INTRAMUSCULAR; INTRAVENOUS; SUBCUTANEOUS at 14:44

## 2025-07-07 RX ADMIN — TRANEXAMIC ACID 1000 MG: 1 INJECTION, SOLUTION INTRAVENOUS at 21:18

## 2025-07-07 RX ADMIN — Medication 100 MCG: at 14:22

## 2025-07-07 RX ADMIN — GABAPENTIN 300 MG: 300 CAPSULE ORAL at 21:27

## 2025-07-07 RX ADMIN — ACETAMINOPHEN 975 MG: 325 TABLET ORAL at 12:17

## 2025-07-07 RX ADMIN — SODIUM CHLORIDE, POTASSIUM CHLORIDE, SODIUM LACTATE AND CALCIUM CHLORIDE: 600; 310; 30; 20 INJECTION, SOLUTION INTRAVENOUS at 14:40

## 2025-07-07 RX ADMIN — ACETAMINOPHEN 650 MG: 325 TABLET ORAL at 19:12

## 2025-07-07 RX ADMIN — ROCURONIUM BROMIDE 20 MG: 10 INJECTION, SOLUTION INTRAVENOUS at 14:05

## 2025-07-07 RX ADMIN — Medication 100 MCG: at 15:22

## 2025-07-07 RX ADMIN — FENTANYL CITRATE 100 MCG: 50 INJECTION, SOLUTION INTRAMUSCULAR; INTRAVENOUS at 13:24

## 2025-07-07 RX ADMIN — SUGAMMADEX 200 MG: 100 INJECTION, SOLUTION INTRAVENOUS at 15:50

## 2025-07-07 RX ADMIN — SCOPOLAMINE 1 PATCH: 1 PATCH, EXTENDED RELEASE TRANSDERMAL at 12:16

## 2025-07-07 RX ADMIN — ROCURONIUM BROMIDE 50 MG: 10 INJECTION, SOLUTION INTRAVENOUS at 13:24

## 2025-07-07 RX ADMIN — SCOPOLAMINE 1 PATCH: 1.5 PATCH, EXTENDED RELEASE TRANSDERMAL at 12:16

## 2025-07-07 RX ADMIN — DEXAMETHASONE SODIUM PHOSPHATE 4 MG: 4 INJECTION, SOLUTION INTRAMUSCULAR; INTRAVENOUS at 13:33

## 2025-07-07 RX ADMIN — HYDROMORPHONE HYDROCHLORIDE 0.5 MG: 2 INJECTION, SOLUTION INTRAMUSCULAR; INTRAVENOUS; SUBCUTANEOUS at 15:14

## 2025-07-07 RX ADMIN — PIPERACILLIN SODIUM AND TAZOBACTAM SODIUM 3.38 G: 3; .375 INJECTION, SOLUTION INTRAVENOUS at 19:11

## 2025-07-07 RX ADMIN — HYDROMORPHONE HYDROCHLORIDE 0.5 MG: 2 INJECTION, SOLUTION INTRAMUSCULAR; INTRAVENOUS; SUBCUTANEOUS at 14:14

## 2025-07-07 RX ADMIN — Medication 100 MCG: at 15:34

## 2025-07-07 SDOH — SOCIAL STABILITY: SOCIAL INSECURITY: WERE YOU ABLE TO COMPLETE ALL THE BEHAVIORAL HEALTH SCREENINGS?: YES

## 2025-07-07 SDOH — SOCIAL STABILITY: SOCIAL INSECURITY: HAVE YOU HAD THOUGHTS OF HARMING ANYONE ELSE?: YES

## 2025-07-07 SDOH — SOCIAL STABILITY: SOCIAL INSECURITY: WITHIN THE LAST YEAR, HAVE YOU BEEN HUMILIATED OR EMOTIONALLY ABUSED IN OTHER WAYS BY YOUR PARTNER OR EX-PARTNER?: NO

## 2025-07-07 SDOH — SOCIAL STABILITY: SOCIAL INSECURITY
WITHIN THE LAST YEAR, HAVE YOU BEEN KICKED, HIT, SLAPPED, OR OTHERWISE PHYSICALLY HURT BY YOUR PARTNER OR EX-PARTNER?: NO

## 2025-07-07 SDOH — ECONOMIC STABILITY: FOOD INSECURITY: WITHIN THE PAST 12 MONTHS, YOU WORRIED THAT YOUR FOOD WOULD RUN OUT BEFORE YOU GOT THE MONEY TO BUY MORE.: NEVER TRUE

## 2025-07-07 SDOH — SOCIAL STABILITY: SOCIAL INSECURITY: HAS ANYONE EVER THREATENED TO HURT YOUR FAMILY OR YOUR PETS?: NO

## 2025-07-07 SDOH — SOCIAL STABILITY: SOCIAL INSECURITY: DO YOU FEEL ANYONE HAS EXPLOITED OR TAKEN ADVANTAGE OF YOU FINANCIALLY OR OF YOUR PERSONAL PROPERTY?: NO

## 2025-07-07 SDOH — SOCIAL STABILITY: SOCIAL INSECURITY: WITHIN THE LAST YEAR, HAVE YOU BEEN AFRAID OF YOUR PARTNER OR EX-PARTNER?: NO

## 2025-07-07 SDOH — ECONOMIC STABILITY: INCOME INSECURITY: IN THE PAST 12 MONTHS HAS THE ELECTRIC, GAS, OIL, OR WATER COMPANY THREATENED TO SHUT OFF SERVICES IN YOUR HOME?: NO

## 2025-07-07 SDOH — HEALTH STABILITY: MENTAL HEALTH: CURRENT SMOKER: 0

## 2025-07-07 SDOH — SOCIAL STABILITY: SOCIAL INSECURITY: DOES ANYONE TRY TO KEEP YOU FROM HAVING/CONTACTING OTHER FRIENDS OR DOING THINGS OUTSIDE YOUR HOME?: NO

## 2025-07-07 SDOH — SOCIAL STABILITY: SOCIAL INSECURITY
WITHIN THE LAST YEAR, HAVE YOU BEEN RAPED OR FORCED TO HAVE ANY KIND OF SEXUAL ACTIVITY BY YOUR PARTNER OR EX-PARTNER?: NO

## 2025-07-07 SDOH — ECONOMIC STABILITY: FOOD INSECURITY: WITHIN THE PAST 12 MONTHS, THE FOOD YOU BOUGHT JUST DIDN'T LAST AND YOU DIDN'T HAVE MONEY TO GET MORE.: NEVER TRUE

## 2025-07-07 SDOH — SOCIAL STABILITY: SOCIAL INSECURITY: ABUSE: ADULT

## 2025-07-07 SDOH — SOCIAL STABILITY: SOCIAL INSECURITY: ARE THERE ANY APPARENT SIGNS OF INJURIES/BEHAVIORS THAT COULD BE RELATED TO ABUSE/NEGLECT?: NO

## 2025-07-07 SDOH — SOCIAL STABILITY: SOCIAL INSECURITY: ARE YOU OR HAVE YOU BEEN THREATENED OR ABUSED PHYSICALLY, EMOTIONALLY, OR SEXUALLY BY ANYONE?: NO

## 2025-07-07 SDOH — SOCIAL STABILITY: SOCIAL INSECURITY: DO YOU FEEL UNSAFE GOING BACK TO THE PLACE WHERE YOU ARE LIVING?: NO

## 2025-07-07 ASSESSMENT — ACTIVITIES OF DAILY LIVING (ADL)
HEARING - RIGHT EAR: FUNCTIONAL
FEEDING YOURSELF: INDEPENDENT
WALKS IN HOME: NEEDS ASSISTANCE
ADEQUATE_TO_COMPLETE_ADL: NO
TOILETING: NEEDS ASSISTANCE
LACK_OF_TRANSPORTATION: NO
BATHING: NEEDS ASSISTANCE
GROOMING: INDEPENDENT
PATIENT'S MEMORY ADEQUATE TO SAFELY COMPLETE DAILY ACTIVITIES?: NO
HEARING - LEFT EAR: FUNCTIONAL
DRESSING YOURSELF: INDEPENDENT
JUDGMENT_ADEQUATE_SAFELY_COMPLETE_DAILY_ACTIVITIES: NO

## 2025-07-07 ASSESSMENT — PAIN - FUNCTIONAL ASSESSMENT
PAIN_FUNCTIONAL_ASSESSMENT: 0-10

## 2025-07-07 ASSESSMENT — ENCOUNTER SYMPTOMS
BLOOD IN STOOL: 0
SHORTNESS OF BREATH: 0
HEMATURIA: 0
COUGH: 0
LIGHT-HEADEDNESS: 1
PALPITATIONS: 0
DYSURIA: 0
ABDOMINAL PAIN: 1
DIZZINESS: 1

## 2025-07-07 ASSESSMENT — PATIENT HEALTH QUESTIONNAIRE - PHQ9
2. FEELING DOWN, DEPRESSED OR HOPELESS: NOT AT ALL
1. LITTLE INTEREST OR PLEASURE IN DOING THINGS: NOT AT ALL
SUM OF ALL RESPONSES TO PHQ9 QUESTIONS 1 & 2: 0

## 2025-07-07 ASSESSMENT — COGNITIVE AND FUNCTIONAL STATUS - GENERAL
HELP NEEDED FOR BATHING: A LITTLE
TOILETING: A LITTLE
MOVING FROM LYING ON BACK TO SITTING ON SIDE OF FLAT BED WITH BEDRAILS: A LITTLE
STANDING UP FROM CHAIR USING ARMS: A LITTLE
MOVING TO AND FROM BED TO CHAIR: A LITTLE
DAILY ACTIVITIY SCORE: 21
CLIMB 3 TO 5 STEPS WITH RAILING: A LITTLE
TURNING FROM BACK TO SIDE WHILE IN FLAT BAD: A LITTLE
MOBILITY SCORE: 18
PATIENT BASELINE BEDBOUND: NO
DRESSING REGULAR LOWER BODY CLOTHING: A LITTLE
WALKING IN HOSPITAL ROOM: A LITTLE

## 2025-07-07 ASSESSMENT — PAIN SCALES - GENERAL
PAINLEVEL_OUTOF10: 3
PAINLEVEL_OUTOF10: 0 - NO PAIN
PAINLEVEL_OUTOF10: 3
PAINLEVEL_OUTOF10: 4
PAINLEVEL_OUTOF10: 6
PAINLEVEL_OUTOF10: 4
PAINLEVEL_OUTOF10: 4

## 2025-07-07 ASSESSMENT — LIFESTYLE VARIABLES
SKIP TO QUESTIONS 9-10: 1
AUDIT-C TOTAL SCORE: 0
HOW OFTEN DO YOU HAVE A DRINK CONTAINING ALCOHOL: NEVER
HOW MANY STANDARD DRINKS CONTAINING ALCOHOL DO YOU HAVE ON A TYPICAL DAY: PATIENT DOES NOT DRINK
AUDIT-C TOTAL SCORE: 0
HOW OFTEN DO YOU HAVE 6 OR MORE DRINKS ON ONE OCCASION: NEVER

## 2025-07-07 ASSESSMENT — PAIN DESCRIPTION - DESCRIPTORS: DESCRIPTORS: ACHING

## 2025-07-07 NOTE — HOSPITAL COURSE
65 year old male is now s/p elective TAR on 7/7 with Dr. Christopher.  Patient has a medical history significant for prostate CA, MDS, cerebral aneurysm, CAD, HTN, HLD, COPD/asthma (recent CT Chest with pulmonary fibrosis), and diverticulosis with recent diverticulitis. Surgical history significant for TAVR and h/o ventral hernias s/p umbilical hernia repair 6-7 years ago with mesh, c/b superficial infection.     Patient had two ABDIEL drains placed during procedure; RLQ drain over mesh and LLQ drain cut to half length in large hernia sac. The left drain was removed prior to discharge, but the patient will be discharged with the right drain in place.     Post-operative course complicated by bleeding primarily from RLQ mesh drain overnight on POD0, likely related to MDS and thrombocytopenia as operative field was dry during mesh placement. TXA x 2 and ICU transfer for hypotension. In total, patient received 2u pRBC, 1u FFP and 1u PLT. Hematology consulted. Lactemia resolved. And Hgb was stable for >48 hours prior to discharge. Transferred to telemetry floor from ICU on 07/09/2025. Pt had an ileus that resolved, but was noted to have multiple episodes of diarrhea, so medicine team ordered a C diff which was negative. He tolerated diet advancement and pain was well controlled on PO medication.     Of note, PT/OT worked with patient and noted acute drop in SpO2 during ambulation, but patient was asymptomatic and improved with rest. He should follow-up with his PCP to discuss if home O2, medication changes, or further testing is indicated.     Prescriptions for gabapentin sent at discharge. Pt should take his home norco as needed, as well as tylenol and ibuprofen. He will follow-up with Dr. Christopher later this week for drain removal.

## 2025-07-07 NOTE — ANESTHESIA PROCEDURE NOTES
Airway  Date/Time: 7/7/2025 1:26 PM  Reason: elective    Airway not difficult    Staffing  Performed: attending   Authorized by: Dilan Huerta MD    Performed by: Dilan Huerta MD  Patient location during procedure: OR    Patient Condition  Indications for airway management: anesthesia and airway protection  Patient position: sniffing  MILS maintained throughout  Planned trial extubation  Sedation level: deep     Final Airway Details   Preoxygenated: yes  Final airway type: endotracheal airway  Successful airway: ETT   Successful intubation technique: video laryngoscopy  Adjuncts used in placement: intubating stylet and anterior pressure/BURP  Endotracheal tube insertion site: oral  Blade: My  Blade size: #4  ETT size (mm): 7.5  Cormack-Lehane Classification: grade I - full view of glottis  Placement verified by: chest auscultation and capnometry   Measured from: lips  ETT to lips (cm): 21  Ventilation between attempts: none  Number of attempts at approach: 1  Number of other approaches attempted: 0

## 2025-07-07 NOTE — OP NOTE
"VENTRAL HERNIA REPAIRS, RECURRENT, RECTRORECTUS APPROACH, MESH REMOVAL (Partial) Operative Note     Date: 2025  OR Location: [facility]    Name: Jose Gaviria \"Crow\", : 1960, Age: 65 y.o., MRN: [id number], Sex: male    Diagnosis  Pre-op Diagnosis      * Ventral hernia without obstruction or gangrene [K43.9]     * Recurrent umbilical hernia [K42.9] Post-op Diagnosis     * Recurrent umbilical hernia [K42.9]     * Incisional hernia, incarcerated [K43.0]     Procedures  VENTRAL HERNIA REPAIR  72822 - [place] RPR AA HERNIA RECR > 10 CM NCRC8/STRANGULATED    THERAPEUTIC ABDOMINAL CAVITY IMPLANT REMOVAL  66427 - [place] RMVL NONINFCT MESH/PROSTH AA/PARASTOMAL HRNA RPR    COMPLEX ABDOMINAL CLOSURE  83732 - [place] REPAIR COMPLEX TRUNK 2.6-7.5 CM    COMPLEX ABDOMINAL CLOSURE  86168 - [place] REPAIR COMPLEX TRUNK EACH ADDITIONAL 5 CM/<    COMPLEX ABDOMINAL CLOSURE  41178 - [place] REPAIR COMPLEX TRUNK EACH ADDITIONAL 5 CM/<      Surgeons      * Tin Christopher - Primary    Resident/Fellow/Other Assistant:  Surgeons and Role:  * No surgeons found with a matching role *    Staff:   Homero: Sunita  Surgical Assistant: Zana Becerra Person: Jonny  Circulator: Honey Quinteros Scrub: Florentin Quinteros Circulator: Dominique    Anesthesia Staff: Anesthesiologist: Dilan Huerta MD  CRNA: FRANSICO [place]-CRNA    Procedure Summary  Anesthesia: General  ASA: III  Estimated Blood Loss: 100 mL  Intra-op Medications:   Administrations occurring from 1330 to 1800 on 25:   Medication Name Total Dose   dexAMETHasone (Decadron) 4 mg/mL IV Syringe 2 mL 4 mg   dexmedeTOMIDine (Precedex) bolus from bag 20 mcg   fentaNYL (Sublimaze) injection 50 mcg/mL 100 mcg   HYDROmorphone PF (Dilaudid) injection 2 mg/mL 1.5 mg   LR bolus Cannot be calculated   ondansetron (Zofran) 2 mg/mL injection 4 mg   phenylephrine 100 mcg/mL syringe 10 mL (prefilled) 300 mcg   propofol (Diprivan) injection 10 mg/mL 50 mg   rocuronium (ZeMuron) 50 mg/5 mL injection 20 mg " "             Anesthesia Record               Intraprocedure I/O Totals          Intake    Dexmedetomidine 0.00 mL    The total shown is the total volume documented since Anesthesia Start was filed.    LR bolus 1000.00 mL    ceFAZolin (Ancef) 2 g in dextrose (iso) IV 50 mL 50.00 mL    Total Intake 1050 mL       Output    Est. Blood Loss 100 mL    Total Output 100 mL       Net    Net Volume 950 mL          Specimen:   ID Type Source Tests Collected by Time   1 : sac Tissue HERNIA SAC SURGICAL PATHOLOGY EXAM Tin Christopher MD PhD 7/7/2025 1353                 Drains and/or Catheters:   Closed/Suction Drain Left LUQ Bulb 19 Fr. (Active)       Closed/Suction Drain Right RUQ Bulb 19 Fr. (Active)       [REMOVED] Urethral Catheter Non-latex 16 Fr. (Removed)       Tourniquet Times:         Implants:  Implants       Type Name Action Serial No.      Graft MESH, SYNECOR PRE, PREPERITONEAL, 20 X 30CM - [id number] - [id number] Implanted [id number]              Findings:     Skin incision made from right of umbilicus to 15 cm superior of umbilicus.   15+ cm supraumbilical sac on 4 cm defect with incarcerated omentum and mesentery of adjacent small bowel.  6 cm defect width in epigastrium with smaller sac.  Smaller periumbilical hernia at left side.  Prior mesh repair, appeared to be preperitoneal under umbilicus. Partially removed for flap mobilization.  12 cm total defects length. 20 x 20 cm Synecore mesh placed in retrorectus space.  Fascia closed off tension. RLQ drain over mesh. LLQ drain cut to half length in subcutaneous tissue.    Indications: Jose Gaviria \"Crow\" is an 65 y.o. male who is having surgery for Ventral hernia without obstruction or gangrene [K43.9]  Recurrent umbilical hernia [K42.9].     The patient was seen in the preoperative area. The risks, benefits, complications, treatment options, non-operative alternatives, expected recovery and outcomes were discussed with the patient. The possibilities of reaction " to medication, pulmonary aspiration, injury to surrounding structures, bleeding, recurrent infection, the need for additional procedures, failure to diagnose a condition, and creating a complication requiring transfusion or operation were discussed with the patient. The patient concurred with the proposed plan, giving informed consent.  The site of surgery was properly noted/marked if necessary per policy. The patient has been actively warmed in preoperative area. Preoperative antibiotics have been ordered and given within 1 hours of incision. Venous thrombosis prophylaxis have been ordered including bilateral sequential compression devices and chemical prophylaxis    Procedure Details: The patient was taken to the operating room. A presurgical huddle was completed which included the patient's identifiers, consent, procedure, and equipment. The patient was placed in the supine position with both arms out on gel padded operating table and placed under general endotracheal anesthesia. All pressure points were padded. A sanchez was placed but fell out after about an hour. The patient was prepped and draped in the normal sterile fashion. IV prophylactic antibiotics were given prior to incision.     After time out a generous upper midline incision was made, using the CT to guide the length to the upper defect. A combination of blunt and sharp dissection away from known hernia defects and incarcerated bowel was used to gain access to the intraabdominal compartment. Careful lysis was employed to free all adhesions between the viscera and the anterior abdominal wall. The mesentery of the small bowel adjacent to the defect was firm but viable. The omentum was reduced. The largest hernia sac was cut out with cautery and sent as a specimen. So was the upper right of midline sac. This was part of the complex wound closure in the thick abdominal wall. Parts of the prior mesh repair around the umbilicus was excised to gain access  to the midline and later to connect the posterior flaps.    The right abdominal wall was lifted and an incision was made into the retrorectus space with electrocautery. Next that space was developed to the lateral extent of the neurovascular bundles.   The left abdominal wall was lifted and an incision was made into the retrorectus space with electrocautery. Next that space was developed to the lateral extent of the neurovascular bundles. A wet blue towel protected the viscera during this.    The flaps were connected near the xiphoid and also under the umbilicus. The remainder of the nonresected mesh was left in place. The flaps were then sutured together in the midline with 2-0 vicryl prior to removing the towel protecting the viscera. The dissected spaces were hemostatic. A soft count was performed.    A fascial defect in the lateral wall of the left periumbilical space were repaired with 0 PDS.    The 20 x 30 cm Synecor Mesh cut to a 20 x 20 cm shape and was placed in a square formation with rounded edges. A 19 fr round drain were placed above the mesh and sutured in place with 3-0 nylon from the RLQ.    The fascia was mobilized from the subcutaneous fat and exposed about 1.5 cm on either side. The fascia was then closed with interrupted figure of eight 0 PDS sutures (about 15). It was under no tension. All counts were correct. A drain was then placed from the LLQ into the old sac superior to the umbilicus on the right.    The area was irrigated with saline solution, and 0.5% Marcaine was injected to provide prolonged postoperative pain relief. The subcutaneous space was closed with three separate layers of interrupted 3-0 vicryl suture. Nonviable fat was removed as part of a complex closure spanning 15 cm. The skin was closed with running 4-0 monocryl after a final count. Skin glue was applied to the stab incisions and midline before an island dressing was placed. The sanchez was not replaced. The patient was  extubated and transferred to the PACU without incident. I was present and scrubbed for the entire procedure.    Evidence of Infection: No   Complications:  None; patient tolerated the procedure well.    Disposition: PACU - hemodynamically stable.  Condition: stable         Task Performed by RNFA or Surgical Assistant:  Exposure, Closure          Additional Details: None    Attending Attestation: I was present and scrubbed for the entire procedure.    Tin Christopher  Phone Number: [phone]

## 2025-07-07 NOTE — ADDENDUM NOTE
Addendum  created 07/07/25 1630 by Dilan Huerta MD    Attestation recorded in Intraprocedure, Intraprocedure Attestations filed

## 2025-07-07 NOTE — ANESTHESIA POSTPROCEDURE EVALUATION
"Patient: Jose Gaviria \"Crow\"    Procedure Summary       Date: 07/07/25 Room / Location: [facility] 06 / [facility]    Anesthesia Start: 1318 Anesthesia Stop: 1606    Procedures:       VENTRAL HERNIA REPAIR      THERAPEUTIC ABDOMINAL CAVITY IMPLANT REMOVAL (Bilateral)      COMPLEX ABDOMINAL CLOSURE Diagnosis:       Recurrent umbilical hernia      Incisional hernia, incarcerated      (Ventral hernia without obstruction or gangrene [K43.9])      (Recurrent umbilical hernia [K42.9])    Surgeons: Tin Christopher MD PhD Responsible Provider: Dilan Huerta MD    Anesthesia Type: general ASA Status: 3            Anesthesia Type: general    Vitals Value Taken Time   /71 07/07/25 16:04   Temp 36.2 07/07/25 16:07   Pulse 101 07/07/25 16:05   Resp 16 07/07/25 16:07   SpO2 96 % 07/07/25 16:05   Vitals shown include unfiled device data.    Anesthesia Post Evaluation    Patient location during evaluation: PACU  Patient participation: complete - patient participated  Level of consciousness: sleepy but conscious  Pain management: adequate  Airway patency: patent  Cardiovascular status: acceptable  Respiratory status: acceptable  Hydration status: acceptable  Postoperative Nausea and Vomiting: none        There were no known notable events for this encounter.    "

## 2025-07-07 NOTE — H&P
"                                                                  ICU HPI Note          PATIENT NAME: Jose Gaviria  MRN: [id number]    SERVICE DATE:  7/7/2025  SERVICE TIME:  7:40 PM    Jose Gaviria \"Crow\" is a 65 y.o. male on day 0 of admission presenting with Umbilical hernia without obstruction and without gangrene.      HPI:  This is a 64 yo M with PMHx significant for GERD, prostate cancer, myelodysplastic syndrome, CAD, HTN, HLD, COPD, diverticulosis, seizures?(Not on meds), TAVR, and ventral hernia s/p scheduled hernia repair on 7/7 by Dr. Christopher. The patient was initially sent to the regular medical floor for recovery. However, he dumped ~400 ml in his right ABDIEL drain in 2 hours, with associated hypotension, prompting him to be transferred to the ICU for further resuscitation. His right ABDIEL drain is clamped, started on 1L fluid bolus and transferred to the ICU.     On arrival, he is pale, has some dizziness and lightheadedness.     Review of Systems   Respiratory:  Negative for cough and shortness of breath.    Cardiovascular:  Negative for chest pain and palpitations.   Gastrointestinal:  Positive for abdominal pain. Negative for blood in stool.   Genitourinary:  Negative for dysuria and hematuria.   Neurological:  Positive for dizziness and light-headedness.          OBJECTIVE    Vitals:    07/07/25 1700 07/07/25 1715 07/07/25 1752 07/07/25 1909   BP: 93/60 96/64 105/61 85/63   BP Location:   Left arm    Patient Position:   Sitting    Pulse: 91 103 103 91   Resp: 16 16 16    Temp:   35.9 °C (96.6 °F) 35.7 °C (96.3 °F)   TempSrc:   Temporal    SpO2: 94% 95% 93% 92%   Weight:   114 kg (251 lb 5.2 oz)    Height:   1.753 m (5' 9.02\")       Results from last 7 days   Lab Units 07/07/25  1912   WBC AUTO x10*3/uL 6.7   HEMOGLOBIN g/dL 11.5*   HEMATOCRIT % 36.1*   PLATELETS AUTO x10*3/uL 182           No lab exists for component: \"LABALBU\"    Scheduled Medications  Scheduled Medications[1]       Physical " Exam  Constitutional:       Appearance: He is ill-appearing.   Cardiovascular:      Rate and Rhythm: Normal rate and regular rhythm.   Pulmonary:      Breath sounds: No wheezing, rhonchi or rales.      Comments: NC  Abdominal:      Comments: Abdominal binder  POD0 ventral hernia repair  A right and left ABDIEL drain   Musculoskeletal:      Right lower leg: No edema.      Left lower leg: No edema.   Skin:     General: Skin is warm and dry.      Capillary Refill: Capillary refill takes 2 to 3 seconds.      Coloration: Skin is pale.   Neurological:      Mental Status: He is alert and oriented to person, place, and time. Mental status is at baseline.      Motor: No weakness.          ASSESSMENT & PLAN  Jose Gaviria is a 65 y.o. year old male patient with PMHx significant for GERD, prostate cancer, myelodysplastic syndrome, CAD, HTN, HLD, COPD, diverticulosis, seizures?(Not on meds), TAVR, and ventral hernia s/p scheduled hernia repair on 7/7 by Dr. Christopher, presenting to the ICU for hemorrhagic shock. He will receive 2 doses of TXA and then his R ABDIEL drain will be unclamped. Surgery is following.     Daily Progress:  7/7: Admitted to the ICU     Neuro/Psych:  No acute concerns   -- CAM/ICU delirium precautions     Respiratory/ENT:  #COPD - not in acute exacerbation   -- on NC   -- SpO2 goal >92%    Cardiovascular:  #Hemorrhagic shock, POD ventral hernia repair  #TAVR (2023)  #Hx of HTN, HLD  -- Active type and screen  -- will be given 1 unit PRBCs and repeat HH at 0000  -- His RIGHT ABDIEL drain is currently clamped. He will receive TXA x 2 doses per surgery, and then his right ABDIEL drain will be unclamped.   -- home antihypertensives, losartan, spironolactone on hold  -- may continue home lipitor   -- maintain MAP > 65     Gastrointestinal/Genitourinary:  #Ventral hernia repair  #GERD  -- multimodal pain regimen per primary. Held toradol and ibuprofen in the setting of hemorrhage   -- diet: NPO   -- bowel regimen including  colace  -- PPI protonix    Renal:  No acute concerns  -- maintain strict I/O in critically ill patient   -- daily BMP, mag, and phos     Endocrine  No acute concerns  -- POCT glucose q4h while NPO      Infectious Disease:  No acute concerns   - Will receive 4 doses of Zosyn for continued surgical prophylaxis    Heme/Onc:  #Acute blood loss anemia, post operatively   #Prostate cancer  #Myelodysplastic syndrome   -- Hgb baseline 14.1, hgb today 11.5  -- maintain DVT ppx with SCDs only, no pharmacologic ppx  -- continue with daily CBC    Musculoskeletal/Integumentary:  No acute concerns     Ethics/Code Status:  Full Code     ICU Checklist:  Vent/O2: NC   Lines/Devices: peripheral IVs  Indwelling Catheters: ---   Drips: ---  ATBx: Zosyn   Fluids: 1 L LR, 1U PRBCs  Diet: NPO   Glycemic Control: ---   PPX: protonix   DVT PPX: SCDs, no pharmacologic ppx in setting of hemorrhagic shock   Restraints:   Code status: Full Code   Dispo: ICU    Critical Care Time: 40 minutes spent in preparing to see patient (I.e. review of medical records), evaluation of diagnostics (I.e. labs, imaging, etc.), documentation, discussing plan of care with patient/ family/ caregiver, and/ or coordination of care with multidisciplinary team. Time does not include completion of procedure time.     Shantal Ho PA-C  Pulmonary and Critical Care  July 7, 2025 7:40 PM            [1] acetaminophen, 650 mg, oral, q6h   Or  acetaminophen, 650 mg, nasogastric tube, q6h   Or  acetaminophen, 650 mg, rectal, q6h  atorvastatin, 20 mg, oral, Daily  docusate sodium, 100 mg, oral, BID  [Held by provider] enoxaparin, 40 mg, subcutaneous, q24h  gabapentin, 300 mg, oral, TID  ketorolac, 15 mg, intravenous, q6h  lactated Ringer's, 1,000 mL, intravenous, Once  [Held by provider] linaCLOtide, 145 mcg, oral, Daily before breakfast  [Held by provider] losartan, 100 mg, oral, Daily  [START ON 7/8/2025] pantoprazole, 40 mg, oral, Daily before  breakfast  piperacillin-tazobactam, 3.375 g, intravenous, q6h  [Held by provider] spironolactone, 25 mg, oral, Daily  tranexamic acid, 1,000 mg, intravenous, q6h

## 2025-07-07 NOTE — ANESTHESIA PREPROCEDURE EVALUATION
"Patient: Jose Gaviria \"Crow\"    Procedure Information       Date/Time: 07/07/25 1230    Procedures:       VENTRAL HERNIA REPAIR - PER DR. CHRISTOPHER CASE WILL BE FOUR HOURS-[facility]      TORSO FASCIA FLAP CREATION (Bilateral) - Creation Fascia Flap Torso      THERAPEUTIC ABDOMINAL CAVITY IMPLANT REMOVAL (Bilateral) - Removal Therapeutic Implant Abdominal Cavity    Location: [facility] 09 / [facility]    Surgeons: Tin Christopher MD PhD            Relevant Problems   Anesthesia (within normal limits)      Cardiac   (+) Acute on chronic heart failure with preserved ejection fraction   (+) Angina, class III   (+) Aortic stenosis   (+) Benign essential hypertension   (+) Essential hypertension   (+) Moderate aortic regurgitation   (+) Pure hypercholesterolemia   (+) Systolic hypertension      Pulmonary   (+) COPD (chronic obstructive pulmonary disease) (Multi)   (+) KENDALL (dyspnea on exertion)      Neuro   (+) Anxiety state   (+) Basilar artery aneurysm   (+) Brain aneurysm (HHS-HCC)   (+) Depression with anxiety   (+) Dysthymia   (+) Intracranial aneurysm (HHS-HCC)   (+) Localization-related (focal) (partial) symptomatic epilepsy and epileptic syndromes with complex partial seizures, not intractable, without status epilepticus   (+) Seizure (Multi)   (+) Seizures (Multi)   (+) Third cranial nerve palsy   (+) Unruptured cerebral aneurysm (HHS-HCC)      GI   (+) Gastroesophageal reflux disease      /Renal   (+) Adenocarcinoma of prostate (Multi)   (+) BPH (benign prostatic hyperplasia)   (+) Malignant neoplasm of prostate (Multi)   (+) Prostate cancer (Multi)      Endocrine   (+) Class 3 severe obesity with body mass index (BMI) of 40.0 to 44.9 in adult   (+) Type 2 diabetes mellitus without complications      Hematology   (+) Anemia   (+) Iron deficiency anemia   (+) Myelodysplastic syndrome (Multi)   (+) Pancytopenia   (+) Thrombocytopenia      Musculoskeletal   (+) Chronic pain syndrome      HEENT   (+) Acute sinusitis "   (+) Narrow angle glaucoma of right eye      ID   (+) Furuncle   (+) Localized infection   (+) Skin infection      Skin   (+) SCC (squamous cell carcinoma)       Clinical information reviewed:   Tobacco  Allergies  Meds   Med Hx  Surg Hx   Fam Hx          NPO Detail:  NPO/Void Status  Date of Last Liquid: 25  Time of Last Liquid: 0700  Date of Last Solid: 25  Time of Last Solid: 183  Last Intake Type: Clear fluids         Physical Exam    Airway  Mallampati: III  TM distance: >3 FB  Neck ROM: full  Mouth openin finger widths     Cardiovascular - normal exam  Rhythm: regular  Rate: normal     Dental - normal exam     Pulmonary - normal examBreath sounds clear to auscultation     Abdominal            Anesthesia Plan    History of general anesthesia?: yes  History of complications of general anesthesia?: no    ASA 3     general     The patient is not a current smoker.    intravenous induction   Postoperative pain plan includes opioids.  Trial extubation is planned.  Anesthetic plan and risks discussed with patient.  Use of blood products discussed with patient who.    Plan discussed with CRNA.

## 2025-07-08 ENCOUNTER — APPOINTMENT (OUTPATIENT)
Dept: CARDIOLOGY | Facility: CLINIC | Age: 65
End: 2025-07-08
Payer: COMMERCIAL

## 2025-07-08 ENCOUNTER — APPOINTMENT (OUTPATIENT)
Dept: CARDIOLOGY | Facility: HOSPITAL | Age: 65
End: 2025-07-08
Payer: COMMERCIAL

## 2025-07-08 LAB
ANION GAP SERPL CALC-SCNC: 14 MMOL/L (ref 10–20)
BUN SERPL-MCNC: 23 MG/DL (ref 6–23)
CALCIUM SERPL-MCNC: 8.1 MG/DL (ref 8.6–10.3)
CHLORIDE SERPL-SCNC: 103 MMOL/L (ref 98–107)
CO2 SERPL-SCNC: 24 MMOL/L (ref 21–32)
CREAT SERPL-MCNC: 1.38 MG/DL (ref 0.5–1.3)
EGFRCR SERPLBLD CKD-EPI 2021: 56 ML/MIN/1.73M*2
ERYTHROCYTE [DISTWIDTH] IN BLOOD BY AUTOMATED COUNT: 19.8 % (ref 11.5–14.5)
GLUCOSE BLD MANUAL STRIP-MCNC: 115 MG/DL (ref 74–99)
GLUCOSE BLD MANUAL STRIP-MCNC: 130 MG/DL (ref 74–99)
GLUCOSE BLD MANUAL STRIP-MCNC: 131 MG/DL (ref 74–99)
GLUCOSE BLD MANUAL STRIP-MCNC: 171 MG/DL (ref 74–99)
GLUCOSE BLD MANUAL STRIP-MCNC: 175 MG/DL (ref 74–99)
GLUCOSE BLD MANUAL STRIP-MCNC: 184 MG/DL (ref 74–99)
GLUCOSE SERPL-MCNC: 137 MG/DL (ref 74–99)
HCT VFR BLD AUTO: 29.2 % (ref 41–52)
HCT VFR BLD AUTO: 29.8 % (ref 41–52)
HCT VFR BLD AUTO: 31.3 % (ref 41–52)
HGB BLD-MCNC: 10.1 G/DL (ref 13.5–17.5)
HGB BLD-MCNC: 9.3 G/DL (ref 13.5–17.5)
HGB BLD-MCNC: 9.7 G/DL (ref 13.5–17.5)
HOLD SPECIMEN: NORMAL
HOLD SPECIMEN: NORMAL
LACTATE SERPL-SCNC: 1.8 MMOL/L (ref 0.4–2)
MAGNESIUM SERPL-MCNC: 1.77 MG/DL (ref 1.6–2.4)
MCH RBC QN AUTO: 35.4 PG (ref 26–34)
MCHC RBC AUTO-ENTMCNC: 32.3 G/DL (ref 32–36)
MCV RBC AUTO: 110 FL (ref 80–100)
NRBC BLD-RTO: 0 /100 WBCS (ref 0–0)
PHOSPHATE SERPL-MCNC: 4.2 MG/DL (ref 2.5–4.9)
PLATELET # BLD AUTO: 123 X10*3/UL (ref 150–450)
POTASSIUM SERPL-SCNC: 4.7 MMOL/L (ref 3.5–5.3)
RBC # BLD AUTO: 2.85 X10*6/UL (ref 4.5–5.9)
SODIUM SERPL-SCNC: 136 MMOL/L (ref 136–145)
WBC # BLD AUTO: 5.2 X10*3/UL (ref 4.4–11.3)

## 2025-07-08 PROCEDURE — 84100 ASSAY OF PHOSPHORUS: CPT

## 2025-07-08 PROCEDURE — 80048 BASIC METABOLIC PNL TOTAL CA: CPT | Performed by: REGISTERED NURSE

## 2025-07-08 PROCEDURE — 82947 ASSAY GLUCOSE BLOOD QUANT: CPT

## 2025-07-08 PROCEDURE — 2500000001 HC RX 250 WO HCPCS SELF ADMINISTERED DRUGS (ALT 637 FOR MEDICARE OP)

## 2025-07-08 PROCEDURE — 2500000004 HC RX 250 GENERAL PHARMACY W/ HCPCS (ALT 636 FOR OP/ED): Performed by: SURGERY

## 2025-07-08 PROCEDURE — 99291 CRITICAL CARE FIRST HOUR: CPT

## 2025-07-08 PROCEDURE — 2500000005 HC RX 250 GENERAL PHARMACY W/O HCPCS

## 2025-07-08 PROCEDURE — 36415 COLL VENOUS BLD VENIPUNCTURE: CPT | Performed by: REGISTERED NURSE

## 2025-07-08 PROCEDURE — 2500000004 HC RX 250 GENERAL PHARMACY W/ HCPCS (ALT 636 FOR OP/ED)

## 2025-07-08 PROCEDURE — 99231 SBSQ HOSP IP/OBS SF/LOW 25: CPT

## 2025-07-08 PROCEDURE — 2500000004 HC RX 250 GENERAL PHARMACY W/ HCPCS (ALT 636 FOR OP/ED): Mod: JZ

## 2025-07-08 PROCEDURE — 85018 HEMOGLOBIN: CPT

## 2025-07-08 PROCEDURE — 85027 COMPLETE CBC AUTOMATED: CPT | Performed by: REGISTERED NURSE

## 2025-07-08 PROCEDURE — 2020000001 HC ICU ROOM DAILY

## 2025-07-08 PROCEDURE — 83735 ASSAY OF MAGNESIUM: CPT

## 2025-07-08 PROCEDURE — 93005 ELECTROCARDIOGRAM TRACING: CPT

## 2025-07-08 PROCEDURE — 36415 COLL VENOUS BLD VENIPUNCTURE: CPT

## 2025-07-08 PROCEDURE — 2500000001 HC RX 250 WO HCPCS SELF ADMINISTERED DRUGS (ALT 637 FOR MEDICARE OP): Performed by: SURGERY

## 2025-07-08 RX ORDER — OXYCODONE HYDROCHLORIDE 5 MG/1
5 TABLET ORAL EVERY 4 HOURS PRN
Refills: 0 | Status: DISCONTINUED | OUTPATIENT
Start: 2025-07-08 | End: 2025-07-08

## 2025-07-08 RX ORDER — METHOCARBAMOL 500 MG/1
500 TABLET, FILM COATED ORAL EVERY 8 HOURS SCHEDULED
Status: DISCONTINUED | OUTPATIENT
Start: 2025-07-08 | End: 2025-07-09

## 2025-07-08 RX ORDER — OXYCODONE HYDROCHLORIDE 5 MG/1
10 TABLET ORAL EVERY 4 HOURS PRN
Refills: 0 | Status: DISCONTINUED | OUTPATIENT
Start: 2025-07-08 | End: 2025-07-09

## 2025-07-08 RX ORDER — ACETAMINOPHEN 325 MG/1
975 TABLET ORAL 3 TIMES DAILY
Status: DISCONTINUED | OUTPATIENT
Start: 2025-07-08 | End: 2025-07-12

## 2025-07-08 RX ORDER — HYDROMORPHONE HYDROCHLORIDE 0.2 MG/ML
0.2 INJECTION INTRAMUSCULAR; INTRAVENOUS; SUBCUTANEOUS EVERY 4 HOURS PRN
Status: DISCONTINUED | OUTPATIENT
Start: 2025-07-08 | End: 2025-07-08

## 2025-07-08 RX ORDER — OXYCODONE HYDROCHLORIDE 5 MG/1
10 TABLET ORAL EVERY 4 HOURS PRN
Refills: 0 | Status: DISCONTINUED | OUTPATIENT
Start: 2025-07-08 | End: 2025-07-08

## 2025-07-08 RX ORDER — IPRATROPIUM BROMIDE AND ALBUTEROL SULFATE 2.5; .5 MG/3ML; MG/3ML
3 SOLUTION RESPIRATORY (INHALATION) EVERY 2 HOUR PRN
Status: DISCONTINUED | OUTPATIENT
Start: 2025-07-08 | End: 2025-07-13 | Stop reason: HOSPADM

## 2025-07-08 RX ORDER — IPRATROPIUM BROMIDE AND ALBUTEROL SULFATE 2.5; .5 MG/3ML; MG/3ML
3 SOLUTION RESPIRATORY (INHALATION) EVERY 4 HOURS PRN
Status: DISCONTINUED | OUTPATIENT
Start: 2025-07-08 | End: 2025-07-08

## 2025-07-08 RX ORDER — OXYCODONE HYDROCHLORIDE 5 MG/1
5 TABLET ORAL EVERY 4 HOURS PRN
Refills: 0 | Status: DISCONTINUED | OUTPATIENT
Start: 2025-07-08 | End: 2025-07-09

## 2025-07-08 RX ADMIN — DOCUSATE SODIUM 100 MG: 100 CAPSULE, LIQUID FILLED ORAL at 20:46

## 2025-07-08 RX ADMIN — ACETAMINOPHEN 975 MG: 325 TABLET, FILM COATED ORAL at 15:01

## 2025-07-08 RX ADMIN — METHOCARBAMOL 500 MG: 500 TABLET ORAL at 21:56

## 2025-07-08 RX ADMIN — IRON SUCROSE 200 MG: 20 INJECTION, SOLUTION INTRAVENOUS at 15:01

## 2025-07-08 RX ADMIN — GABAPENTIN 300 MG: 300 CAPSULE ORAL at 15:02

## 2025-07-08 RX ADMIN — DOCUSATE SODIUM 100 MG: 100 CAPSULE, LIQUID FILLED ORAL at 08:43

## 2025-07-08 RX ADMIN — OXYCODONE HYDROCHLORIDE 5 MG: 5 TABLET ORAL at 03:02

## 2025-07-08 RX ADMIN — PIPERACILLIN SODIUM AND TAZOBACTAM SODIUM 3.38 G: 3; .375 INJECTION, SOLUTION INTRAVENOUS at 12:15

## 2025-07-08 RX ADMIN — TRANEXAMIC ACID 1000 MG: 1 INJECTION, SOLUTION INTRAVENOUS at 02:58

## 2025-07-08 RX ADMIN — ACETAMINOPHEN 650 MG: 325 TABLET ORAL at 01:00

## 2025-07-08 RX ADMIN — OXYCODONE HYDROCHLORIDE 5 MG: 5 TABLET ORAL at 20:49

## 2025-07-08 RX ADMIN — OXYCODONE HYDROCHLORIDE 10 MG: 5 TABLET ORAL at 15:57

## 2025-07-08 RX ADMIN — PIPERACILLIN SODIUM AND TAZOBACTAM SODIUM 3.38 G: 3; .375 INJECTION, SOLUTION INTRAVENOUS at 01:18

## 2025-07-08 RX ADMIN — GABAPENTIN 300 MG: 300 CAPSULE ORAL at 08:43

## 2025-07-08 RX ADMIN — DEXTROSE AND SODIUM CHLORIDE 100 ML/HR: 5; 450 INJECTION, SOLUTION INTRAVENOUS at 06:09

## 2025-07-08 RX ADMIN — PIPERACILLIN SODIUM AND TAZOBACTAM SODIUM 3.38 G: 3; .375 INJECTION, SOLUTION INTRAVENOUS at 18:43

## 2025-07-08 RX ADMIN — ATORVASTATIN CALCIUM 20 MG: 20 TABLET, FILM COATED ORAL at 08:43

## 2025-07-08 RX ADMIN — METHOCARBAMOL 500 MG: 500 TABLET ORAL at 13:18

## 2025-07-08 RX ADMIN — OXYCODONE HYDROCHLORIDE 10 MG: 5 TABLET ORAL at 11:56

## 2025-07-08 RX ADMIN — ACETAMINOPHEN 975 MG: 325 TABLET, FILM COATED ORAL at 20:49

## 2025-07-08 RX ADMIN — ACETAMINOPHEN 975 MG: 325 TABLET, FILM COATED ORAL at 12:15

## 2025-07-08 RX ADMIN — GABAPENTIN 300 MG: 300 CAPSULE ORAL at 20:46

## 2025-07-08 RX ADMIN — ACETAMINOPHEN 650 MG: 325 TABLET ORAL at 06:09

## 2025-07-08 RX ADMIN — PIPERACILLIN SODIUM AND TAZOBACTAM SODIUM 3.38 G: 3; .375 INJECTION, SOLUTION INTRAVENOUS at 06:09

## 2025-07-08 RX ADMIN — PANTOPRAZOLE SODIUM 40 MG: 40 TABLET, DELAYED RELEASE ORAL at 06:09

## 2025-07-08 RX ADMIN — HYDROMORPHONE HYDROCHLORIDE 0.2 MG: 0.2 INJECTION, SOLUTION INTRAMUSCULAR; INTRAVENOUS; SUBCUTANEOUS at 10:28

## 2025-07-08 RX ADMIN — OXYCODONE HYDROCHLORIDE 5 MG: 5 TABLET ORAL at 08:43

## 2025-07-08 ASSESSMENT — PAIN DESCRIPTION - LOCATION
LOCATION: ABDOMEN

## 2025-07-08 ASSESSMENT — PAIN DESCRIPTION - DESCRIPTORS
DESCRIPTORS: ACHING

## 2025-07-08 ASSESSMENT — PAIN SCALES - GENERAL
PAINLEVEL_OUTOF10: 6
PAINLEVEL_OUTOF10: 7
PAINLEVEL_OUTOF10: 6
PAINLEVEL_OUTOF10: 7
PAINLEVEL_OUTOF10: 4
PAINLEVEL_OUTOF10: 5 - MODERATE PAIN
PAINLEVEL_OUTOF10: 7
PAINLEVEL_OUTOF10: 5 - MODERATE PAIN

## 2025-07-08 ASSESSMENT — PAIN - FUNCTIONAL ASSESSMENT
PAIN_FUNCTIONAL_ASSESSMENT: 0-10

## 2025-07-08 ASSESSMENT — ACTIVITIES OF DAILY LIVING (ADL): LACK_OF_TRANSPORTATION: NO

## 2025-07-08 ASSESSMENT — PAIN DESCRIPTION - ORIENTATION: ORIENTATION: RIGHT

## 2025-07-08 NOTE — TREATMENT PLAN
Patient seen and examined. Chart reviewed where relevant. Discussed findings and clinical plan with note author. Modifications or addendum made only as necessary, and agree with finalized documentation.    Status post large ventral hernia repair with retrorectus mesh placement, 20 x 20 Synecor. RLQ drain above mesh. LLQ drain in one of the large hernia sacs. Thin fascia. Prior mesh, excised near umbilical stalk. Hemostatic case. Complicated by bleeding primarily from RLQ mesh drain overnight. TXA x 2 and ICU transfer for hypotension. Resolved with one unit of blood and fluids. Lactemia resolved. No pressors required. O2 via NC for COPD at baseline.    Medicine and critical care on consult. Appreciate care.  HGB stable. No further transfusion required at this time. Will recheck HGB at noon. CLD after if stable. Hold DVT prophylaxis until this evening.  Continue abdominal binder. Needs a four panel binder for tight pressure.  No strikethrough in midline. Minimal tenderness. No firm tissue.  Monitor drain outputs and record.  Continue zosyn. History of mesh infection. Hematoma increases probability of infection.  Anticipate discharge Monday. Likely CT prior to any drain removal.    Tin Christopher MD, PhD  Available via [email or web address]

## 2025-07-08 NOTE — CARE PLAN
Problem: Pain - Adult  Goal: Verbalizes/displays adequate comfort level or baseline comfort level  Outcome: Progressing     Problem: Safety - Adult  Goal: Free from fall injury  Outcome: Progressing     Problem: Discharge Planning  Goal: Discharge to home or other facility with appropriate resources  Outcome: Progressing     Problem: Chronic Conditions and Co-morbidities  Goal: Patient's chronic conditions and co-morbidity symptoms are monitored and maintained or improved  Outcome: Progressing     Problem: Fall/Injury  Goal: Not fall by end of shift  Outcome: Met  Goal: Verbalize understanding of personal risk factors for fall in the hospital  Outcome: Progressing     Problem: Pain  Goal: Takes deep breaths with improved pain control throughout the shift  Outcome: Progressing  Goal: Turns in bed with improved pain control throughout the shift  Outcome: Progressing     Problem: Skin  Goal: Participates in plan/prevention/treatment measures  Outcome: Progressing  Goal: Prevent/manage excess moisture  Outcome: Progressing  Goal: Prevent/minimize sheer/friction injuries  Outcome: Progressing  Goal: Promote skin healing  Outcome: Progressing     The patient's goals for the shift include      The clinical goals for the shift include pain management

## 2025-07-08 NOTE — PROGRESS NOTES
"Jose Gaviria \"Brandon" is a 65 y.o. male on day 1 of admission presenting with Umbilical hernia without obstruction and without gangrene.    Subjective   Pt with copious blood from ABDIEL last night and mild hypotension. Received TXA x2 and 1u pRBC with stabilization of BP. ABDIEL output now thin blood. Initial pre-op Hgb 14.1, dropped to 11.5. Now slowly drifting down. Mild FABI, lactemia resolved.        Objective     Physical Exam  Constitutional:       General: He is not in acute distress.     Appearance: He is obese. He is not ill-appearing or toxic-appearing.   HENT:      Mouth/Throat:      Mouth: Mucous membranes are moist.   Cardiovascular:      Rate and Rhythm: Normal rate and regular rhythm.      Heart sounds: Normal heart sounds.   Pulmonary:      Effort: Pulmonary effort is normal.      Breath sounds: Normal breath sounds.   Abdominal:      General: Bowel sounds are decreased. There is distension (mild).      Palpations: Abdomen is soft.      Tenderness: There is abdominal tenderness (incisional, moderate). There is no guarding.      Comments: Mild strikethrough on island dressing. Abd binder in place. JPx2 with thin blood, Lt ABDIEL slightly thinner than right.    Skin:     General: Skin is warm and dry.   Neurological:      Mental Status: He is alert and oriented to person, place, and time.   Psychiatric:         Mood and Affect: Mood normal.         Behavior: Behavior normal.         Last Recorded Vitals  Blood pressure 122/61, pulse 72, temperature 36 °C (96.8 °F), resp. rate (!) 29, height 1.753 m (5' 9.02\"), weight 115 kg (254 lb 3.1 oz), SpO2 94%.  Intake/Output last 3 Shifts:  I/O last 3 completed shifts:  In: 4047 (35.1 mL/kg) [P.O.:237; I.V.:1100 (9.5 mL/kg); Blood:310; IV Piggyback:2400]  Out: 1220 (10.6 mL/kg) [Urine:110 (0 mL/kg/hr); Drains:1010; Blood:100]  Weight: 115.3 kg     Relevant Results  Results for orders placed or performed during the hospital encounter of 07/07/25 (from the past 24 hours) "   POCT PT/INR   Result Value Ref Range    POCT Prothrombin time 12.6 seconds    POCT INR 1.1    VERIFY ABO/Rh Group Test   Result Value Ref Range    ABO TYPE O     Rh TYPE POS    POCT GLUCOSE   Result Value Ref Range    POCT Glucose 209 (H) 74 - 99 mg/dL   CBC   Result Value Ref Range    WBC 6.7 4.4 - 11.3 x10*3/uL    nRBC 0.0 0.0 - 0.0 /100 WBCs    RBC 3.12 (L) 4.50 - 5.90 x10*6/uL    Hemoglobin 11.5 (L) 13.5 - 17.5 g/dL    Hematocrit 36.1 (L) 41.0 - 52.0 %     (H) 80 - 100 fL    MCH 36.9 (H) 26.0 - 34.0 pg    MCHC 31.9 (L) 32.0 - 36.0 g/dL    RDW 16.7 (H) 11.5 - 14.5 %    Platelets 182 150 - 450 x10*3/uL   PST Top   Result Value Ref Range    Extra Tube Hold for add-ons.    Prepare RBC: 1 Units   Result Value Ref Range    PRODUCT CODE [id number]     Unit Number [id number]     Unit ABO O     Unit RH POS     XM INTEP COMP     Dispense Status TR     Blood Expiration Date 8/4/2025 11:59:00 PM EDT     PRODUCT BLOOD TYPE 5100     UNIT VOLUME 350    Coagulation Screen   Result Value Ref Range    Protime 12.7 (H) 9.8 - 12.4 seconds    INR 1.1 0.9 - 1.1    aPTT 28 26 - 36 seconds   Lactate   Result Value Ref Range    Lactate 3.2 (H) 0.4 - 2.0 mmol/L   SST TOP   Result Value Ref Range    Extra Tube Hold for add-ons.    POCT GLUCOSE   Result Value Ref Range    POCT Glucose 146 (H) 74 - 99 mg/dL   Lactate   Result Value Ref Range    Lactate 1.8 0.4 - 2.0 mmol/L   CBC   Result Value Ref Range    WBC 5.1 4.4 - 11.3 x10*3/uL    nRBC 0.0 0.0 - 0.0 /100 WBCs    RBC 3.12 (L) 4.50 - 5.90 x10*6/uL    Hemoglobin 10.9 (L) 13.5 - 17.5 g/dL    Hematocrit 34.5 (L) 41.0 - 52.0 %     (H) 80 - 100 fL    MCH 34.9 (H) 26.0 - 34.0 pg    MCHC 31.6 (L) 32.0 - 36.0 g/dL    RDW 19.5 (H) 11.5 - 14.5 %    Platelets 122 (L) 150 - 450 x10*3/uL   Prepare RBC: 1 Units   Result Value Ref Range    PRODUCT CODE P2321L75     Unit Number [id number]     Unit ABO O     Unit RH POS     XM INTEP COMP     Dispense Status XM     Blood  Expiration Date 8/4/2025 11:59:00 PM EDT     PRODUCT BLOOD TYPE 5100     UNIT VOLUME 350    POCT GLUCOSE   Result Value Ref Range    POCT Glucose 175 (H) 74 - 99 mg/dL   POCT GLUCOSE   Result Value Ref Range    POCT Glucose 171 (H) 74 - 99 mg/dL   CBC   Result Value Ref Range    WBC 5.2 4.4 - 11.3 x10*3/uL    nRBC 0.0 0.0 - 0.0 /100 WBCs    RBC 2.85 (L) 4.50 - 5.90 x10*6/uL    Hemoglobin 10.1 (L) 13.5 - 17.5 g/dL    Hematocrit 31.3 (L) 41.0 - 52.0 %     (H) 80 - 100 fL    MCH 35.4 (H) 26.0 - 34.0 pg    MCHC 32.3 32.0 - 36.0 g/dL    RDW 19.8 (H) 11.5 - 14.5 %    Platelets 123 (L) 150 - 450 x10*3/uL   Basic Metabolic Panel   Result Value Ref Range    Glucose 137 (H) 74 - 99 mg/dL    Sodium 136 136 - 145 mmol/L    Potassium 4.7 3.5 - 5.3 mmol/L    Chloride 103 98 - 107 mmol/L    Bicarbonate 24 21 - 32 mmol/L    Anion Gap 14 10 - 20 mmol/L    Urea Nitrogen 23 6 - 23 mg/dL    Creatinine 1.38 (H) 0.50 - 1.30 mg/dL    eGFR 56 (L) >60 mL/min/1.73m*2    Calcium 8.1 (L) 8.6 - 10.3 mg/dL   Magnesium   Result Value Ref Range    Magnesium 1.77 1.60 - 2.40 mg/dL   Phosphorus   Result Value Ref Range    Phosphorus 4.2 2.5 - 4.9 mg/dL     *Note: Due to a large number of results and/or encounters for the requested time period, some results have not been displayed. A complete set of results can be found in Results Review.       Assessment/Plan   Patient is a 64 y/o M with PMH significant for prostate CA, MDS, cerebral aneurysm, CAD, HTN, HLD, COPD/asthma (recent CT Chest with pulmonary fibrosis), and diverticulosis with recent diverticulitis who underwent elective rectrorectus hernia repair with Dr. Christopher on 7/7.    Procedures:  7/7 - Rectrorectus hernia repair with mesh with Dr. Christopher  7/8 - 1u pRBC and TXA x2    Assessment and Plan:    #S/p hernia repair  - Maintain abd binder at all times  - Maintain Jpx2    > Empty and record output Q4  - NPO for now  - Multimodal pain management    > Scheduled tylenol and  gabapentin    > Toradol held d/t bleeding    > Oxycodone increase to 5/10 for mod/sever pain    > Add robaxin  - Repeat AM labs    #Post-op hematoma  #Acute blood loss anemia  #Hemorrhagic shock - resolved  - s/p 1u pRBC and TXA x2  - Hypotension resolved  - Hgb drifting down  - Repeat H&H now  - ABDIEL output thinner  - Continue zosyn for now to prevent hematoma infection    Chronic conditions:  #CAD  #HTN  #HLD  #COPD  #Asthma  - Continue home meds as appropriate  - Appreciate ICU input    DVT ppx: lovenox held for now given bleeding. SCDs only.    Dispo: Anticipate discharge to home medically ready.     Patient seen and discussed with attending surgeon, Dr. Christopher.    Sofie Mccrary PA-C

## 2025-07-08 NOTE — PROGRESS NOTES
07/08/25 1205   Discharge Planning   Living Arrangements Spouse/significant other   Support Systems Spouse/significant other   Assistance Needed Pt was independent with mobility and ADL's   Type of Residence Private residence   Number of Stairs to Enter Residence 3   Number of Stairs Within Residence 0   Do you have animals or pets at home? Yes   Type of Animals or Pets 1 cat   Home or Post Acute Services   (TBD)   Does the patient need discharge transport arranged? No   Financial Resource Strain   How hard is it for you to pay for the very basics like food, housing, medical care, and heating? Not hard   Housing Stability   In the last 12 months, was there a time when you were not able to pay the mortgage or rent on time? N   At any time in the past 12 months, were you homeless or living in a shelter (including now)? N   Transportation Needs   In the past 12 months, has lack of transportation kept you from medical appointments or from getting medications? no   In the past 12 months, has lack of transportation kept you from meetings, work, or from getting things needed for daily living? No   Patient Choice   Patient / Family choosing to utilize agency / facility established prior to hospitalization No   Stroke Family Assessment   Stroke Family Assessment Needed No   Intensity of Service   Intensity of Service 0-30 min     Kwabena completed the DC planning assessment with pt in pt's room. Pt is a/ox3 and is pleasant to work with. Pt was admitted to the hospital with a dx of Umbilical Hernia without obstruction. Kwabena educated pt on the role of the SW in the DC planning process.  Pt was residing with his wife in a 2 story house with 3 steps to enter and first floor set up.  Pt was independent with mobility and ADL's. Pt owns a walker and a cane if needed. Pt's wife will provide transportation on day of dc. Kwabena confirmed pt's address and contact info. Pt's pcp is Dr. Li Cloud. Pt owns one cat. Pt currently has 2 ABDIEL  drains and if he goes home with them may need hhc set up. Pt also on IV antibiotics. Sw answered all further questions pt had. Sw will continue to provide support and service as needed to ensure a safe dc plan is in place.

## 2025-07-08 NOTE — SIGNIFICANT EVENT
Patient was assessed after being contacted by nursing staff regarding patient's ABDIEL drain output (350 out in 2 hours) and hypotension (SBP in 80s) with SOB. After evaluation and discussion with Dr. Christopher, it was decided to transfer the patient to ICU for TXA x 2 doses and BP stability.  Patient was immediately made n.p.o. in the event of return to the OR.  She 1L LR ordered prior to the transfer as well as a CBC.  Right ABDIEL drain only was clamped per Dr. Christopher's request.  Upon arrival to the ICU, just before 2000, 1 unit PRBCs, lactate, and coag screen were ordered.  The unit of blood was started at 2024 and first dose of TXA started at 2118.  Dressings were again checked for any additional drainage since ABDIEL clamping, and there was no additional drainage noted.  Patient's abdomen remained soft, but tender around the incision sites, which was to be expected.  Patient will have a repeat CBC at midnight and second dose of TXA at 0330.

## 2025-07-08 NOTE — PROGRESS NOTES
"                                                                ICU Progress Note       PATIENT NAME: Jose Gaviria  MRN: [id number]    SERVICE DATE:  7/8/2025  SERVICE TIME:  12:50 PM      Jose Gaviria \"Crow\" is a 65 y.o. male on day 1 of admission presenting with Umbilical hernia without obstruction and without gangrene.      SUBJECTIVE    Patient seen and examined at bedside.  Patient received TXA x 2 and 1 unit of blood overnight.  This morning hemoglobin is stable 10.1.    OBJECTIVE    Vitals:    07/08/25 0900 07/08/25 1000 07/08/25 1028 07/08/25 1215   BP: 124/68 112/56 122/61    Pulse: 68 67 72    Resp: 18 15 (!) 29    Temp:    36.4 °C (97.5 °F)   TempSrc:    Temporal   SpO2: 95% 94% 94%    Weight:       Height:          Results from last 7 days   Lab Units 07/08/25  1230 07/08/25  0647   WBC AUTO x10*3/uL  --  5.2   HEMOGLOBIN g/dL 9.7* 10.1*   HEMATOCRIT % 29.8* 31.3*   PLATELETS AUTO x10*3/uL  --  123*     Results from last 7 days   Lab Units 07/08/25  0647   SODIUM mmol/L 136   POTASSIUM mmol/L 4.7   CHLORIDE mmol/L 103   CO2 mmol/L 24   BUN mg/dL 23   CREATININE mg/dL 1.38*   CALCIUM mg/dL 8.1*   GLUCOSE mg/dL 137*       Scheduled Medications  Scheduled Medications[1]     Physical Exam  Physical Exam  Constitutional:       Appearance: Normal appearance. He is obese.   HENT:      Mouth/Throat:      Mouth: Mucous membranes are moist.      Pharynx: Oropharynx is clear.   Eyes:      Extraocular Movements: Extraocular movements intact.      Conjunctiva/sclera: Conjunctivae normal.   Cardiovascular:      Rate and Rhythm: Normal rate and regular rhythm.   Pulmonary:      Effort: Pulmonary effort is normal.      Breath sounds: No wheezing, rhonchi or rales.   Abdominal:      Tenderness: There is abdominal tenderness.      Comments: Incision are well appearing. Abdominal binder in place. Two ABDIEL drains present with serosanguinous fluid present.    Musculoskeletal:      Right lower leg: No edema.      Left lower leg: " No edema.   Skin:     General: Skin is warm and dry.   Neurological:      General: No focal deficit present.      Mental Status: He is alert.            ASSESSMENT & PLAN  Jose Gaviria is a 65 y.o. year old male patient with PMHx significant for GERD, prostate cancer, myelodysplastic syndrome, CAD, HTN, HLD, COPD, diverticulosis, seizures?(Not on meds), TAVR, and ventral hernia s/p scheduled hernia repair on 7/7 by Dr. Christopher, presenting to the ICU for hemorrhagic shock. He received 2 doses of TXA and 1 unit of PRBCs.     Pt  presenting to [facility] with No chief complaint on file.       Daily Progress:   7/8: S/p TXA and 1 unit PRBCs. Hemoglobin stable 10.1 this  morning. Repeat 9.7 this afternoon. Minimal serosanguinous output from ABDIEL drains. Patient has remained medically stable after TXA and unit of blood. ICU will sign off.     Neuro:  -No acute concerns  -CAM/ICU delirium precuations    CV:  #Hemorrhagic shock, resolved  #TAVR (2023)  #Hx of HTN, HLD  PLAN:  -active type and screen  -ABDIEL drains unclamped  -holding home antihypertensives, losartan, spironolactone   -continue home lipitor  -maintain MAP greater than 65    Respiratory:     #COPD, not in exacerbation  #Post-op respiratory insufficiency  -on NC, wean as tolerated    GI:  #Ventral hernia repair  #GERD  -multimodal pain regimen per surgery  -Clear liquid diet  -bowel regimen  -protonix    Endo:  -no acute concerns  -POCT glucose q4h     Renal:  -No acute concerns  -trend BMP, mag and phos    Hematological:  #Postop blood loss anemia  #Myelodysplastic syndrome  -Trend CBC  -SCDs for DVT ppx    ID:  -no acute concerns    Vent/O2:   NC  Lines/Devices: PIV, ABDIEL drains x2  Drips: --  ATBx: --  Fluids: --  Diet: CLD  PPX: PPI  DVT PPX: SCDs  Code status: Full code  Dispo: medical floor    Dedra El DO PGY-2 Internal Medicine  This is a preliminary note, please await attending attestation for final A/P.   July 8, 2025 12:50 PM                [1]  acetaminophen, 975 mg, oral, TID  atorvastatin, 20 mg, oral, Daily  docusate sodium, 100 mg, oral, BID  [Held by provider] enoxaparin, 40 mg, subcutaneous, q24h  gabapentin, 300 mg, oral, TID  [Held by provider] ketorolac, 15 mg, intravenous, q6h  [START ON 7/9/2025] linaCLOtide, 145 mcg, oral, Daily before breakfast  [Held by provider] losartan, 100 mg, oral, Daily  methocarbamol, 500 mg, oral, q8h FRANCISCO  pantoprazole, 40 mg, oral, Daily before breakfast  piperacillin-tazobactam, 3.375 g, intravenous, q6h  [Held by provider] spironolactone, 25 mg, oral, Daily

## 2025-07-09 ENCOUNTER — APPOINTMENT (OUTPATIENT)
Dept: RADIOLOGY | Facility: HOSPITAL | Age: 65
DRG: 353 | End: 2025-07-09
Payer: COMMERCIAL

## 2025-07-09 LAB
ANION GAP SERPL CALC-SCNC: 8 MMOL/L (ref 10–20)
APTT PPP: 29 SECONDS (ref 26–36)
BLOOD EXPIRATION DATE: NORMAL
BUN SERPL-MCNC: 18 MG/DL (ref 6–23)
CALCIUM SERPL-MCNC: 8.5 MG/DL (ref 8.6–10.3)
CHLORIDE SERPL-SCNC: 104 MMOL/L (ref 98–107)
CO2 SERPL-SCNC: 29 MMOL/L (ref 21–32)
CREAT SERPL-MCNC: 1.24 MG/DL (ref 0.5–1.3)
DISPENSE STATUS: NORMAL
EGFRCR SERPLBLD CKD-EPI 2021: 64 ML/MIN/1.73M*2
ERYTHROCYTE [DISTWIDTH] IN BLOOD BY AUTOMATED COUNT: 19.3 % (ref 11.5–14.5)
FERRITIN SERPL-MCNC: 434 NG/ML (ref 20–300)
GLUCOSE BLD MANUAL STRIP-MCNC: 119 MG/DL (ref 74–99)
GLUCOSE SERPL-MCNC: 114 MG/DL (ref 74–99)
HCT VFR BLD AUTO: 25.3 % (ref 41–52)
HCT VFR BLD AUTO: 27.8 % (ref 41–52)
HGB BLD-MCNC: 8.1 G/DL (ref 13.5–17.5)
HGB BLD-MCNC: 8.8 G/DL (ref 13.5–17.5)
INR PPP: 1.3 (ref 0.9–1.1)
IRON SATN MFR SERPL: 39 % (ref 25–45)
IRON SERPL-MCNC: 138 UG/DL (ref 35–150)
MCH RBC QN AUTO: 35.3 PG (ref 26–34)
MCHC RBC AUTO-ENTMCNC: 31.7 G/DL (ref 32–36)
MCV RBC AUTO: 112 FL (ref 80–100)
NRBC BLD-RTO: 0 /100 WBCS (ref 0–0)
PLATELET # BLD AUTO: 104 X10*3/UL (ref 150–450)
POTASSIUM SERPL-SCNC: 3.9 MMOL/L (ref 3.5–5.3)
PRODUCT BLOOD TYPE: 5100
PRODUCT CODE: NORMAL
PROTHROMBIN TIME: 13.9 SECONDS (ref 9.8–12.4)
RBC # BLD AUTO: 2.49 X10*6/UL (ref 4.5–5.9)
SODIUM SERPL-SCNC: 137 MMOL/L (ref 136–145)
TIBC SERPL-MCNC: 351 UG/DL (ref 240–445)
UIBC SERPL-MCNC: 213 UG/DL (ref 110–370)
UNIT ABO: NORMAL
UNIT NUMBER: NORMAL
UNIT RH: NORMAL
UNIT VOLUME: 219
WBC # BLD AUTO: 4.4 X10*3/UL (ref 4.4–11.3)

## 2025-07-09 PROCEDURE — 2500000001 HC RX 250 WO HCPCS SELF ADMINISTERED DRUGS (ALT 637 FOR MEDICARE OP): Performed by: SURGERY

## 2025-07-09 PROCEDURE — P9017 PLASMA 1 DONOR FRZ W/IN 8 HR: HCPCS

## 2025-07-09 PROCEDURE — 82728 ASSAY OF FERRITIN: CPT | Mod: PARLAB

## 2025-07-09 PROCEDURE — 94640 AIRWAY INHALATION TREATMENT: CPT

## 2025-07-09 PROCEDURE — 71045 X-RAY EXAM CHEST 1 VIEW: CPT

## 2025-07-09 PROCEDURE — 82947 ASSAY GLUCOSE BLOOD QUANT: CPT

## 2025-07-09 PROCEDURE — 2500000001 HC RX 250 WO HCPCS SELF ADMINISTERED DRUGS (ALT 637 FOR MEDICARE OP)

## 2025-07-09 PROCEDURE — 83550 IRON BINDING TEST: CPT

## 2025-07-09 PROCEDURE — 99221 1ST HOSP IP/OBS SF/LOW 40: CPT

## 2025-07-09 PROCEDURE — 85014 HEMATOCRIT: CPT

## 2025-07-09 PROCEDURE — 2500000005 HC RX 250 GENERAL PHARMACY W/O HCPCS: Performed by: SURGERY

## 2025-07-09 PROCEDURE — 2500000004 HC RX 250 GENERAL PHARMACY W/ HCPCS (ALT 636 FOR OP/ED): Mod: JZ

## 2025-07-09 PROCEDURE — 36415 COLL VENOUS BLD VENIPUNCTURE: CPT

## 2025-07-09 PROCEDURE — 85610 PROTHROMBIN TIME: CPT

## 2025-07-09 PROCEDURE — 85027 COMPLETE CBC AUTOMATED: CPT

## 2025-07-09 PROCEDURE — 80048 BASIC METABOLIC PNL TOTAL CA: CPT

## 2025-07-09 PROCEDURE — 36430 TRANSFUSION BLD/BLD COMPNT: CPT

## 2025-07-09 PROCEDURE — 1200000002 HC GENERAL ROOM WITH TELEMETRY DAILY

## 2025-07-09 PROCEDURE — 30233N1 TRANSFUSION OF NONAUTOLOGOUS RED BLOOD CELLS INTO PERIPHERAL VEIN, PERCUTANEOUS APPROACH: ICD-10-PCS | Performed by: SURGERY

## 2025-07-09 PROCEDURE — 2500000002 HC RX 250 W HCPCS SELF ADMINISTERED DRUGS (ALT 637 FOR MEDICARE OP, ALT 636 FOR OP/ED): Performed by: SURGERY

## 2025-07-09 PROCEDURE — 99232 SBSQ HOSP IP/OBS MODERATE 35: CPT

## 2025-07-09 PROCEDURE — 71045 X-RAY EXAM CHEST 1 VIEW: CPT | Performed by: STUDENT IN AN ORGANIZED HEALTH CARE EDUCATION/TRAINING PROGRAM

## 2025-07-09 RX ORDER — IPRATROPIUM BROMIDE AND ALBUTEROL SULFATE 2.5; .5 MG/3ML; MG/3ML
3 SOLUTION RESPIRATORY (INHALATION)
Status: DISCONTINUED | OUTPATIENT
Start: 2025-07-09 | End: 2025-07-12

## 2025-07-09 RX ORDER — DEXTROSE, SODIUM CHLORIDE, SODIUM LACTATE, POTASSIUM CHLORIDE, AND CALCIUM CHLORIDE 5; .6; .31; .03; .02 G/100ML; G/100ML; G/100ML; G/100ML; G/100ML
75 INJECTION, SOLUTION INTRAVENOUS CONTINUOUS
Status: DISCONTINUED | OUTPATIENT
Start: 2025-07-09 | End: 2025-07-11

## 2025-07-09 RX ORDER — HYDROMORPHONE HYDROCHLORIDE 1 MG/ML
1 INJECTION, SOLUTION INTRAMUSCULAR; INTRAVENOUS; SUBCUTANEOUS
Status: DISCONTINUED | OUTPATIENT
Start: 2025-07-09 | End: 2025-07-11

## 2025-07-09 RX ORDER — HYDROMORPHONE HYDROCHLORIDE 1 MG/ML
0.5 INJECTION, SOLUTION INTRAMUSCULAR; INTRAVENOUS; SUBCUTANEOUS
Status: DISCONTINUED | OUTPATIENT
Start: 2025-07-09 | End: 2025-07-11

## 2025-07-09 RX ORDER — IPRATROPIUM BROMIDE AND ALBUTEROL SULFATE 2.5; .5 MG/3ML; MG/3ML
3 SOLUTION RESPIRATORY (INHALATION) 3 TIMES DAILY
Status: DISCONTINUED | OUTPATIENT
Start: 2025-07-09 | End: 2025-07-09

## 2025-07-09 RX ADMIN — IRON SUCROSE 200 MG: 20 INJECTION, SOLUTION INTRAVENOUS at 06:13

## 2025-07-09 RX ADMIN — ACETAMINOPHEN 975 MG: 325 TABLET, FILM COATED ORAL at 08:03

## 2025-07-09 RX ADMIN — ATORVASTATIN CALCIUM 20 MG: 20 TABLET, FILM COATED ORAL at 08:03

## 2025-07-09 RX ADMIN — ONDANSETRON 4 MG: 4 TABLET, ORALLY DISINTEGRATING ORAL at 07:50

## 2025-07-09 RX ADMIN — METHOCARBAMOL 500 MG: 500 TABLET ORAL at 06:12

## 2025-07-09 RX ADMIN — DOCUSATE SODIUM 100 MG: 100 CAPSULE, LIQUID FILLED ORAL at 08:03

## 2025-07-09 RX ADMIN — LINACLOTIDE 145 MCG: 145 CAPSULE, GELATIN COATED ORAL at 06:13

## 2025-07-09 RX ADMIN — Medication 2 L/MIN: at 19:38

## 2025-07-09 RX ADMIN — HYDROMORPHONE HYDROCHLORIDE 1 MG: 1 INJECTION, SOLUTION INTRAMUSCULAR; INTRAVENOUS; SUBCUTANEOUS at 18:05

## 2025-07-09 RX ADMIN — OXYCODONE HYDROCHLORIDE 10 MG: 5 TABLET ORAL at 07:51

## 2025-07-09 RX ADMIN — HYDROMORPHONE HYDROCHLORIDE 1 MG: 1 INJECTION, SOLUTION INTRAMUSCULAR; INTRAVENOUS; SUBCUTANEOUS at 12:59

## 2025-07-09 RX ADMIN — GABAPENTIN 300 MG: 300 CAPSULE ORAL at 15:13

## 2025-07-09 RX ADMIN — PIPERACILLIN SODIUM AND TAZOBACTAM SODIUM 3.38 G: 3; .375 INJECTION, SOLUTION INTRAVENOUS at 18:05

## 2025-07-09 RX ADMIN — GABAPENTIN 300 MG: 300 CAPSULE ORAL at 21:06

## 2025-07-09 RX ADMIN — OXYCODONE HYDROCHLORIDE 10 MG: 5 TABLET ORAL at 03:42

## 2025-07-09 RX ADMIN — ACETAMINOPHEN 975 MG: 325 TABLET, FILM COATED ORAL at 15:13

## 2025-07-09 RX ADMIN — DEXTROSE, SODIUM CHLORIDE, SODIUM LACTATE, POTASSIUM CHLORIDE, AND CALCIUM CHLORIDE 75 ML/HR: 5; .6; .31; .03; .02 INJECTION, SOLUTION INTRAVENOUS at 09:58

## 2025-07-09 RX ADMIN — PIPERACILLIN SODIUM AND TAZOBACTAM SODIUM 3.38 G: 3; .375 INJECTION, SOLUTION INTRAVENOUS at 00:05

## 2025-07-09 RX ADMIN — ACETAMINOPHEN 975 MG: 325 TABLET, FILM COATED ORAL at 21:06

## 2025-07-09 RX ADMIN — PIPERACILLIN SODIUM AND TAZOBACTAM SODIUM 3.38 G: 3; .375 INJECTION, SOLUTION INTRAVENOUS at 06:13

## 2025-07-09 RX ADMIN — IPRATROPIUM BROMIDE AND ALBUTEROL SULFATE 3 ML: .5; 3 SOLUTION RESPIRATORY (INHALATION) at 18:53

## 2025-07-09 RX ADMIN — DOCUSATE SODIUM 100 MG: 100 CAPSULE, LIQUID FILLED ORAL at 21:06

## 2025-07-09 RX ADMIN — PANTOPRAZOLE SODIUM 40 MG: 40 TABLET, DELAYED RELEASE ORAL at 06:13

## 2025-07-09 RX ADMIN — PIPERACILLIN SODIUM AND TAZOBACTAM SODIUM 3.38 G: 3; .375 INJECTION, SOLUTION INTRAVENOUS at 12:54

## 2025-07-09 RX ADMIN — HYDROMORPHONE HYDROCHLORIDE 1 MG: 1 INJECTION, SOLUTION INTRAMUSCULAR; INTRAVENOUS; SUBCUTANEOUS at 21:06

## 2025-07-09 RX ADMIN — Medication 2 L/MIN: at 19:21

## 2025-07-09 RX ADMIN — GABAPENTIN 300 MG: 300 CAPSULE ORAL at 08:03

## 2025-07-09 ASSESSMENT — PAIN SCALES - GENERAL
PAINLEVEL_OUTOF10: 8
PAINLEVEL_OUTOF10: 7
PAINLEVEL_OUTOF10: 0 - NO PAIN
PAINLEVEL_OUTOF10: 0 - NO PAIN
PAINLEVEL_OUTOF10: 8
PAINLEVEL_OUTOF10: 0 - NO PAIN
PAINLEVEL_OUTOF10: 0 - NO PAIN

## 2025-07-09 ASSESSMENT — PAIN DESCRIPTION - DESCRIPTORS: DESCRIPTORS: ACHING

## 2025-07-09 ASSESSMENT — PAIN - FUNCTIONAL ASSESSMENT
PAIN_FUNCTIONAL_ASSESSMENT: 0-10

## 2025-07-09 NOTE — TREATMENT PLAN
Afternoon Hgb continues to drop and JPs still thin bloody. FFP ordered. ABDIEL out appears to be thinning now. Long discussion with patient's wife at bedside about post-op course. She is very concerned about his breathing. Will order CXR, expect atelectasis. Can trial duonebs, but may not provide much benefit. Continue to closely monitor. Transfer to [facility] as pt has been HDS for >24 hours and bleeding appears to be slowing.     Sofie Mccrary PA-C

## 2025-07-09 NOTE — PROGRESS NOTES
"Jose Gaviria \"Crow\" is a 65 y.o. male on day 2 of admission presenting with Umbilical hernia without obstruction and without gangrene.    Subjective   Hgb and Plt continue to drift down. Pt remains hemodynamically stable at baseline, but becomes tachycardic into the 130s and hypoxic into mid 80s with minimal exertion. He does endorse increased abdominal pain today and nausea. He only had a few sips of coffee this AM. JPs continue to be thin bloody, but fairly low output. Right drain slightly thinner, left drain similar appearance to yesterday.     Rt ABDIEL: 86 ml/24 hours  Lt ABDIEL: 25 ml/24 hours       Objective     Physical Exam  Constitutional:       General: He is not in acute distress.     Appearance: He is obese. He is not ill-appearing or toxic-appearing.      Comments: Uncomfortable appearing   HENT:      Mouth/Throat:      Mouth: Mucous membranes are moist.   Cardiovascular:      Rate and Rhythm: Normal rate and regular rhythm.      Heart sounds: Normal heart sounds.   Pulmonary:      Effort: Pulmonary effort is normal.      Breath sounds: Normal breath sounds.      Comments: 2L NC  Abdominal:      General: Bowel sounds are absent. There is distension (mild).      Palpations: Abdomen is soft.      Tenderness: There is abdominal tenderness (incisional, moderate). There is no guarding.      Comments: Dressing removed, incision well approximated with mild ecchymosis to left; no drainage or evidence of infection.   Upper abdomen quite tender with possible palpable hematoma.  JPx2 with thin blood, right ABDIEL slightly thinner than left.   Skin:     General: Skin is warm and dry.   Neurological:      Mental Status: He is alert and oriented to person, place, and time.   Psychiatric:         Mood and Affect: Mood normal.         Behavior: Behavior normal.         Last Recorded Vitals  Blood pressure 144/71, pulse 78, temperature 36.7 °C (98.1 °F), temperature source Temporal, resp. rate 19, height 1.753 m (5' 9.02\"), " weight 115 kg (254 lb 3.1 oz), SpO2 94%.  Intake/Output last 3 Shifts:  I/O last 3 completed shifts:  In: 3848.3 (33.4 mL/kg) [I.V.:2438.3 (21.1 mL/kg); Blood:310; IV Piggyback:1100]  Out: 3066 (26.6 mL/kg) [Urine:2385 (0.6 mL/kg/hr); Drains:681]  Weight: 115.3 kg     Relevant Results  Results for orders placed or performed during the hospital encounter of 07/07/25 (from the past 24 hours)   POCT GLUCOSE   Result Value Ref Range    POCT Glucose 184 (H) 74 - 99 mg/dL   Hemoglobin and Hematocrit, Blood   Result Value Ref Range    Hemoglobin 9.3 (L) 13.5 - 17.5 g/dL    Hematocrit 29.2 (L) 41.0 - 52.0 %   POCT GLUCOSE   Result Value Ref Range    POCT Glucose 115 (H) 74 - 99 mg/dL   POCT GLUCOSE   Result Value Ref Range    POCT Glucose 131 (H) 74 - 99 mg/dL   POCT GLUCOSE   Result Value Ref Range    POCT Glucose 119 (H) 74 - 99 mg/dL   CBC   Result Value Ref Range    WBC 4.4 4.4 - 11.3 x10*3/uL    nRBC 0.0 0.0 - 0.0 /100 WBCs    RBC 2.49 (L) 4.50 - 5.90 x10*6/uL    Hemoglobin 8.8 (L) 13.5 - 17.5 g/dL    Hematocrit 27.8 (L) 41.0 - 52.0 %     (H) 80 - 100 fL    MCH 35.3 (H) 26.0 - 34.0 pg    MCHC 31.7 (L) 32.0 - 36.0 g/dL    RDW 19.3 (H) 11.5 - 14.5 %    Platelets 104 (L) 150 - 450 x10*3/uL   Basic Metabolic Panel   Result Value Ref Range    Glucose 114 (H) 74 - 99 mg/dL    Sodium 137 136 - 145 mmol/L    Potassium 3.9 3.5 - 5.3 mmol/L    Chloride 104 98 - 107 mmol/L    Bicarbonate 29 21 - 32 mmol/L    Anion Gap 8 (L) 10 - 20 mmol/L    Urea Nitrogen 18 6 - 23 mg/dL    Creatinine 1.24 0.50 - 1.30 mg/dL    eGFR 64 >60 mL/min/1.73m*2    Calcium 8.5 (L) 8.6 - 10.3 mg/dL   Coagulation Screen   Result Value Ref Range    Protime 13.9 (H) 9.8 - 12.4 seconds    INR 1.3 (H) 0.9 - 1.1    aPTT 29 26 - 36 seconds   SST TOP   Result Value Ref Range    Extra Tube Hold for add-ons.    Iron and TIBC   Result Value Ref Range    Iron 138 35 - 150 ug/dL    UIBC 213 110 - 370 ug/dL    TIBC 351 240 - 445 ug/dL    % Saturation 39 25 - 45 %      *Note: Due to a large number of results and/or encounters for the requested time period, some results have not been displayed. A complete set of results can be found in Results Review.       Assessment/Plan   Patient is a 66 y/o M with PMH significant for prostate CA, MDS, cerebral aneurysm, CAD, HTN, HLD, COPD/asthma (recent CT Chest with pulmonary fibrosis), and diverticulosis with recent diverticulitis who underwent elective rectrorectus hernia repair with Dr. Christopher on 7/7.    Procedures:  7/7 - Rectrorectus hernia repair with mesh with Dr. Christopher  7/8 - 1u pRBC and TXA x2    Assessment and Plan:    #S/p hernia repair  #Ileus  - Maintain abd binder at all times  - Maintain Jpx2    > Empty and record output Q4  - NPO except sips and chips  - Multimodal pain management    > Scheduled tylenol and gabapentin    > Toradol held d/t bleeding    > Switch to dilaudid given ileus    > Stop robaxin given minimal benefit  - Repeat AM labs    #Post-op hematoma  #Acute blood loss anemia - worse  #Hemorrhagic shock - resolved  - s/p 1u pRBC and TXA x2  - Hypotension resolved  - Hgb drifting down  - Repeat H&H now  - ABDIEL output thinner  - Continue zosyn for now to prevent hematoma infection  - Hematology consulted given history of MDS with increased risk of perioperative bleeding    > Trend CBC and INR    > FFP if INR >2    > No indication for platelets at this time    Chronic conditions:  #CAD  #HTN  #HLD  #COPD  #Asthma  - Continue home meds as appropriate  - Appreciate ICU input    DVT ppx: lovenox held for now given bleeding. SCDs only.    Dispo: Not medically ready for discharge. Anticipate discharge to home medically ready.     Patient discussed with attending surgeon, Dr. Christopher.    Sofie Mccrary PA-C

## 2025-07-09 NOTE — CONSULTS
Name: Jose Gaviria  MRN: [id number]  Encounter Date: 7/9/2025  PCP: Li Cloud,   Heme-Onc: Dr. Emmanuel Pierce MD     Reason for consult: Known MDS, now with bleeding s/p complex hernia repair  Attending Provider: Dr. Emmanuel Pierce MD     Hematology/Oncology Consult Note     Subjective / History     Jose Gaviria is a 65 y.o. male with a PMHx of anemia with MDS, prostate CA, SCC of skin, moderate concentric left ventricular hypertrophy, CAD, aortic stenosis s/p bioprosthetic replacement, essential HTN, hypercholesterolemia, COPD, T2DM, left SCA cerebral aneurysm s/p primary coiling, seizure disorder, umbilical hernia who presented to [facility] on 7/7/25 for an elective ventral hernia repair procedure per Dr. Christopher.  In the postop phase, patient had excessive ABDIEL drain output, experienced hypotension (SBP in 80s), and was transferred to the ICU for escalation in care.  HDS s/p IVF without pressor support, afebrile, labs stable with Hgb 8.8 today, down from 11.5 two days ago, 14.1 in 6/27/2025 recently.  MCV chronically elevated since 2023.  Platelets today at 104, down from 182 on 7/7.  INR today slightly elevated at 1.3, up from 1.1 on 7.7.  He received a dose of Toradol and Celebrex on 7/7.  Received 1 UPRBC,  mg iron (Venofer) x 2, IV 1 g tranexamic acid x 2.  ABDIEL drain x 2 (LUQ, RUQ bulb drains) with 1L serosanguineous output on 7/7, minimal output thereafter.     When speaking with the patient, he reaffirms the above history, states that he feels occasionally presyncopal upon motion but otherwise denies complaints presently.     A 10 point ROS was performed with the patient denying any complaint at this time aside from those listed in the HPI above.     Past Medical History  Medical History[1]   Past Surgical History  Surgical History[2]  Family History   Family History[3]  Social History  Former cigarette smoker, 2 packs/day for 37 years. Quit in 2018.  Allergies  RX Allergies[4]  Code Status  Full  Code     Hematologic/Oncologic History     Presented to Dr. Ovalle for pogressive, mild thrombocytopenia.  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  He has not received treatment for this to date.  He was being seen he has been seen by hematology at [facility] at one point.     The patient underwent aortic valve replacement in March 2023 and received blood transfusion.  Also had iron deficiency component and received intravenous Feraheme as well.  The patient was placed on oral iron replacement therapy after surgery.     The patient and his wife had come for follow-up on January 27, 2025 regarding history of multifactorial anemia including iron deficiency component myelodysplastic syndrome 7 q. minus. It was recommended to continue Ferrex/Iferex 150 mg half an hour after food daily and to reassess in 6 months.     On 7/1/2025, patient was cleared per hematology for his surgery on 7/7 for ventral hernia repair.     Assessment & Plan     Jose Gaviria is a 65 y.o. male with a PMHx of anemia with MDS, prostate CA, SCC of skin, moderate concentric left ventricular hypertrophy, CAD, aortic stenosis s/p bioprosthetic replacement, essential HTN, hypercholesterolemia, COPD, T2DM, left SCA cerebral aneurysm s/p primary coiling, seizure disorder, umbilical hernia who presented to [facility] on 7/7/25 for an elective ventral hernia repair procedure per Dr. Christopher.      Acute on chronic macrocytic anemia, component of acute blood loss and chronic iron deficiency  History of myelodysplastic syndrome  Ventral hernia repair  Hemorrhagic shock 2/2 postop blood loss, resolved    Trend daily CBC and monitor for bleeding and ABDIEL output (decreasing)  Maintain type and screen. Transfuse for Hgb  <7  Though he already received iron transfusions, check iron indices; if iron low I/S/O active bleeding, transfuse with more IV iron. Resume home oral iron supplementation on discharge  Serial coag panel checks; if INR > 2, consider transfusing with one unit FFP - no indication for platelet transfusion presently      Dr. lCif Aldridge, DO  PGY-3, Internal Medicine    This is a preliminary note, please await attending attestation for final recommendations     Objective (Vitals, labs, radiological imaging, cardiac work up were personally reviewed)     Physical Exam  Vitals reviewed.   Constitutional:       General: He is awake. He is not in acute distress.     Appearance: He is overweight.      Interventions: Nasal cannula in place.   HENT:      Head: Normocephalic and atraumatic.      Mouth/Throat:      Mouth: Mucous membranes are moist.      Pharynx: Oropharynx is clear.   Eyes:      Extraocular Movements: Extraocular movements intact.      Conjunctiva/sclera: Conjunctivae normal.      Pupils: Pupils are equal, round, and reactive to light.   Cardiovascular:      Rate and Rhythm: Normal rate and regular rhythm.      Pulses: Normal pulses.   Pulmonary:      Effort: Pulmonary effort is normal. No respiratory distress.   Abdominal:      General: Abdomen is flat and protuberant. Bowel sounds are normal.      Palpations: Abdomen is soft.      Tenderness: There is abdominal tenderness.      Comments: Incision C/D/I. Abdominal binder in place. Two ABDIEL drains present with serosanguinous fluid present.     Musculoskeletal:         General: No swelling or tenderness. Normal range of motion.   Skin:     General: Skin is warm and dry.      Capillary Refill: Capillary refill takes less than 2 seconds.   Neurological:      General: No focal deficit present.      Mental Status: He is oriented to person, place, and time. Mental status is at baseline. He is lethargic.      Motor: Weakness present.   Psychiatric:         Mood and  Affect: Mood normal.         Behavior: Behavior is cooperative.         Thought Content: Thought content normal.     Last Recorded Vitals & Intake/Output  Vitals:    07/09/25 0500 07/09/25 0600 07/09/25 0700 07/09/25 0800   BP: 135/67 167/71 148/70 162/76   BP Location:       Patient Position:       Pulse: 73 76 77 104   Resp: 20 20 20 26   Temp:    35.8 °C (96.4 °F)   TempSrc:    Temporal   SpO2: 98% 96% 93% 95%   Weight:       Height:         I/O last 3 completed shifts:  In: 3848.3 (33.4 mL/kg) [I.V.:2438.3 (21.1 mL/kg); Blood:310; IV Piggyback:1100]  Out: 3066 (26.6 mL/kg) [Urine:2385 (0.6 mL/kg/hr); Drains:681]  Weight: 115.3 kg     Ventilator/O2 Supply  Oxygen Therapy/Pulse Ox  Medical Gas Therapy: Supplemental oxygen  Medical Gas Delivery Method: Nasal cannula  SpO2: 95 %  Patient Activity During SpO2 Measurement: At rest  Temp: 35.8 °C (96.4 °F)    Labs  Results from last 7 days   Lab Units 07/09/25  0429 07/08/25  0647   SODIUM mmol/L 137 136   POTASSIUM mmol/L 3.9 4.7   CHLORIDE mmol/L 104 103   CO2 mmol/L 29 24   BUN mg/dL 18 23   CREATININE mg/dL 1.24 1.38*   GLUCOSE mg/dL 114* 137*   CALCIUM mg/dL 8.5* 8.1*     Results from last 7 days   Lab Units 07/09/25  0428   WBC AUTO x10*3/uL 4.4   HEMOGLOBIN g/dL 8.8*   HEMATOCRIT % 27.8*   PLATELETS AUTO x10*3/uL 104*     Meds  Scheduled Medications[5]  Continuous Medications[6]   PRN Medications[7]     Current Outpatient Medications   Medication Instructions    amoxicillin (Amoxil) 500 mg capsule Take 4 caps (2000 mg) 30-60mins prior to your dental appointment    aspirin 81 mg EC tablet 1 tablet, Daily    atorvastatin (LIPITOR) 20 mg, oral, Daily    ferrous sulfate 325 mg, oral, Daily, Ferrous sulfate 325 mg tablet, 1 tablet half an hour after food daily.    fluticasone (Flonase) 50 mcg/actuation nasal spray 2 sprays, Each Nostril, Daily    HYDROcodone-acetaminophen (Norco) 5-325 mg tablet 2 tablets, oral, 3 times daily PRN    linaCLOtide (LINZESS) 145 mcg,  "oral, Daily before breakfast, Do not crush or chew.    losartan (COZAAR) 100 mg, oral, Daily    multivitamin capsule 1 capsule, Daily    multivitamin with minerals (multivit-min-iron fum-folic ac) tablet 1 tablet, Daily    naloxone (NARCAN) 1 mg, Once    pantoprazole (PROTONIX) 40 mg, oral, Daily before breakfast, Do not crush, chew, or split.    spironolactone (ALDACTONE) 25 mg, oral, Daily              [1]   Past Medical History:  Diagnosis Date    Encounter for immunization 09/26/2016    Flu vaccine need    Personal history of other diseases of the musculoskeletal system and connective tissue 04/14/2014    History of low back pain    Unspecified open wound of unspecified finger without damage to nail, subsequent encounter 06/11/2020    Wound, open, finger, subsequent encounter   [2]   Past Surgical History:  Procedure Laterality Date    OTHER SURGICAL HISTORY  01/28/2019    Eye surgery    OTHER SURGICAL HISTORY  01/28/2019    Intracranial aneurysm repair    OTHER SURGICAL HISTORY  01/28/2019    Prostate biopsy   [3]   Family History  Problem Relation Name Age of Onset    Heart attack Mother      Heart attack Father     [4]   Allergies  Allergen Reactions    Ciprofloxacin Other and Hallucinations     \"makes me feel strange\"   [5] acetaminophen, 975 mg, oral, TID  atorvastatin, 20 mg, oral, Daily  docusate sodium, 100 mg, oral, BID  [Held by provider] enoxaparin, 40 mg, subcutaneous, q24h  gabapentin, 300 mg, oral, TID  iron sucrose, 200 mg, intravenous, Daily  [Held by provider] ketorolac, 15 mg, intravenous, q6h  linaCLOtide, 145 mcg, oral, Daily before breakfast  [Held by provider] losartan, 100 mg, oral, Daily  pantoprazole, 40 mg, oral, Daily before breakfast  piperacillin-tazobactam, 3.375 g, intravenous, q6h  [Held by provider] spironolactone, 25 mg, oral, Daily     [6] dextrose 5 % and lactated Ringer's, 75 mL/hr     [7] PRN medications: HYDROmorphone, HYDROmorphone, ipratropium-albuteroL, naloxone, " ondansetron ODT **OR** ondansetron

## 2025-07-10 LAB
ABO GROUP (TYPE) IN BLOOD: NORMAL
ANION GAP SERPL CALC-SCNC: 8 MMOL/L (ref 10–20)
ANTIBODY SCREEN: NORMAL
APTT PPP: 30 SECONDS (ref 26–36)
ATRIAL RATE: 65 BPM
BASOPHILS # BLD AUTO: 0 X10*3/UL (ref 0–0.1)
BASOPHILS NFR BLD AUTO: 0 %
BLOOD EXPIRATION DATE: NORMAL
BUN SERPL-MCNC: 14 MG/DL (ref 6–23)
CALCIUM SERPL-MCNC: 8.2 MG/DL (ref 8.6–10.3)
CHLORIDE SERPL-SCNC: 104 MMOL/L (ref 98–107)
CO2 SERPL-SCNC: 30 MMOL/L (ref 21–32)
CREAT SERPL-MCNC: 1.02 MG/DL (ref 0.5–1.3)
D DIMER PPP FEU-MCNC: 6482 NG/ML FEU
DISPENSE STATUS: NORMAL
EGFRCR SERPLBLD CKD-EPI 2021: 81 ML/MIN/1.73M*2
EOSINOPHIL # BLD AUTO: 0.05 X10*3/UL (ref 0–0.7)
EOSINOPHIL NFR BLD AUTO: 1.9 %
ERYTHROCYTE [DISTWIDTH] IN BLOOD BY AUTOMATED COUNT: 19.5 % (ref 11.5–14.5)
FIBRINOGEN PPP-MCNC: 692 MG/DL (ref 200–400)
FOLATE SERPL-MCNC: 18.1 NG/ML
GLUCOSE SERPL-MCNC: 118 MG/DL (ref 74–99)
HAPTOGLOB SERPL NEPH-MCNC: 139 MG/DL (ref 30–200)
HCT VFR BLD AUTO: 22 % (ref 41–52)
HGB BLD-MCNC: 7.1 G/DL (ref 13.5–17.5)
HGB RETIC QN: 37 PG (ref 28–38)
HOLD SPECIMEN: NORMAL
IMM GRANULOCYTES # BLD AUTO: 0.07 X10*3/UL (ref 0–0.7)
IMM GRANULOCYTES NFR BLD AUTO: 2.6 % (ref 0–0.9)
IMMATURE RETIC FRACTION: 34.6 %
INR PPP: 1.1 (ref 0.9–1.1)
INR PPP: 1.2 (ref 0.9–1.1)
LDH SERPL L TO P-CCNC: 389 U/L (ref 84–246)
LYMPHOCYTES # BLD AUTO: 0.66 X10*3/UL (ref 1.2–4.8)
LYMPHOCYTES NFR BLD AUTO: 24.4 %
MCH RBC QN AUTO: 36.8 PG (ref 26–34)
MCHC RBC AUTO-ENTMCNC: 32.3 G/DL (ref 32–36)
MCV RBC AUTO: 114 FL (ref 80–100)
MONOCYTES # BLD AUTO: 0.34 X10*3/UL (ref 0.1–1)
MONOCYTES NFR BLD AUTO: 12.6 %
NEUTROPHILS # BLD AUTO: 1.58 X10*3/UL (ref 1.2–7.7)
NEUTROPHILS NFR BLD AUTO: 58.5 %
NRBC BLD-RTO: 0 /100 WBCS (ref 0–0)
P AXIS: 46 DEGREES
PLATELET # BLD AUTO: 91 X10*3/UL (ref 150–450)
POTASSIUM SERPL-SCNC: 3.5 MMOL/L (ref 3.5–5.3)
PR INTERVAL: 193 MS
PRODUCT BLOOD TYPE: 5100
PRODUCT BLOOD TYPE: 5100
PRODUCT BLOOD TYPE: 6200
PRODUCT CODE: NORMAL
PROTHROMBIN TIME: 12.6 SECONDS (ref 9.8–12.4)
PROTHROMBIN TIME: 13.2 SECONDS (ref 9.8–12.4)
Q ONSET: 251 MS
QRS COUNT: 10 BEATS
QRS DURATION: 160 MS
QT INTERVAL: 440 MS
QTC CALCULATION(BAZETT): 458 MS
QTC FREDERICIA: 451 MS
R AXIS: -1 DEGREES
RBC # BLD AUTO: 1.93 X10*6/UL (ref 4.5–5.9)
RETICS #: 0.06 X10*6/UL (ref 0.02–0.11)
RETICS/RBC NFR AUTO: 2.8 % (ref 0.5–2)
RH FACTOR (ANTIGEN D): NORMAL
SODIUM SERPL-SCNC: 138 MMOL/L (ref 136–145)
T AXIS: 169 DEGREES
T OFFSET: 471 MS
TSH SERPL-ACNC: 0.7 MIU/L (ref 0.44–3.98)
UNIT ABO: NORMAL
UNIT NUMBER: NORMAL
UNIT RH: NORMAL
UNIT VOLUME: 262
UNIT VOLUME: 350
UNIT VOLUME: 350
VENTRICULAR RATE: 65 BPM
WBC # BLD AUTO: 2.7 X10*3/UL (ref 4.4–11.3)
XM INTEP: NORMAL
XM INTEP: NORMAL

## 2025-07-10 PROCEDURE — 2500000001 HC RX 250 WO HCPCS SELF ADMINISTERED DRUGS (ALT 637 FOR MEDICARE OP)

## 2025-07-10 PROCEDURE — 85384 FIBRINOGEN ACTIVITY: CPT | Performed by: INTERNAL MEDICINE

## 2025-07-10 PROCEDURE — 84443 ASSAY THYROID STIM HORMONE: CPT | Performed by: INTERNAL MEDICINE

## 2025-07-10 PROCEDURE — 83615 LACTATE (LD) (LDH) ENZYME: CPT | Performed by: INTERNAL MEDICINE

## 2025-07-10 PROCEDURE — 82746 ASSAY OF FOLIC ACID SERUM: CPT | Mod: PARLAB | Performed by: INTERNAL MEDICINE

## 2025-07-10 PROCEDURE — 86850 RBC ANTIBODY SCREEN: CPT | Performed by: NURSE PRACTITIONER

## 2025-07-10 PROCEDURE — 85027 COMPLETE CBC AUTOMATED: CPT

## 2025-07-10 PROCEDURE — 85045 AUTOMATED RETICULOCYTE COUNT: CPT | Performed by: INTERNAL MEDICINE

## 2025-07-10 PROCEDURE — 85610 PROTHROMBIN TIME: CPT

## 2025-07-10 PROCEDURE — 1200000002 HC GENERAL ROOM WITH TELEMETRY DAILY

## 2025-07-10 PROCEDURE — 85730 THROMBOPLASTIN TIME PARTIAL: CPT | Performed by: INTERNAL MEDICINE

## 2025-07-10 PROCEDURE — 30233K1 TRANSFUSION OF NONAUTOLOGOUS FROZEN PLASMA INTO PERIPHERAL VEIN, PERCUTANEOUS APPROACH: ICD-10-PCS | Performed by: SURGERY

## 2025-07-10 PROCEDURE — 36415 COLL VENOUS BLD VENIPUNCTURE: CPT | Performed by: NURSE PRACTITIONER

## 2025-07-10 PROCEDURE — 99232 SBSQ HOSP IP/OBS MODERATE 35: CPT | Performed by: NURSE PRACTITIONER

## 2025-07-10 PROCEDURE — 85379 FIBRIN DEGRADATION QUANT: CPT | Performed by: INTERNAL MEDICINE

## 2025-07-10 PROCEDURE — 80048 BASIC METABOLIC PNL TOTAL CA: CPT

## 2025-07-10 PROCEDURE — 86923 COMPATIBILITY TEST ELECTRIC: CPT

## 2025-07-10 PROCEDURE — 85610 PROTHROMBIN TIME: CPT | Performed by: INTERNAL MEDICINE

## 2025-07-10 PROCEDURE — 2500000004 HC RX 250 GENERAL PHARMACY W/ HCPCS (ALT 636 FOR OP/ED)

## 2025-07-10 PROCEDURE — 36430 TRANSFUSION BLD/BLD COMPNT: CPT

## 2025-07-10 PROCEDURE — 2500000005 HC RX 250 GENERAL PHARMACY W/O HCPCS: Performed by: SURGERY

## 2025-07-10 PROCEDURE — P9016 RBC LEUKOCYTES REDUCED: HCPCS

## 2025-07-10 PROCEDURE — 83010 ASSAY OF HAPTOGLOBIN QUANT: CPT | Mod: PARLAB | Performed by: INTERNAL MEDICINE

## 2025-07-10 PROCEDURE — 36415 COLL VENOUS BLD VENIPUNCTURE: CPT

## 2025-07-10 PROCEDURE — P9073 PLATELETS PHERESIS PATH REDU: HCPCS

## 2025-07-10 RX ADMIN — HYDROMORPHONE HYDROCHLORIDE 1 MG: 1 INJECTION, SOLUTION INTRAMUSCULAR; INTRAVENOUS; SUBCUTANEOUS at 18:46

## 2025-07-10 RX ADMIN — PIPERACILLIN SODIUM AND TAZOBACTAM SODIUM 3.38 G: 3; .375 INJECTION, SOLUTION INTRAVENOUS at 00:26

## 2025-07-10 RX ADMIN — IRON SUCROSE 200 MG: 20 INJECTION, SOLUTION INTRAVENOUS at 06:28

## 2025-07-10 RX ADMIN — GABAPENTIN 300 MG: 300 CAPSULE ORAL at 21:11

## 2025-07-10 RX ADMIN — ACETAMINOPHEN 975 MG: 325 TABLET, FILM COATED ORAL at 09:00

## 2025-07-10 RX ADMIN — PIPERACILLIN SODIUM AND TAZOBACTAM SODIUM 3.38 G: 3; .375 INJECTION, SOLUTION INTRAVENOUS at 21:11

## 2025-07-10 RX ADMIN — PIPERACILLIN SODIUM AND TAZOBACTAM SODIUM 3.38 G: 3; .375 INJECTION, SOLUTION INTRAVENOUS at 14:06

## 2025-07-10 RX ADMIN — DOCUSATE SODIUM 100 MG: 100 CAPSULE, LIQUID FILLED ORAL at 09:00

## 2025-07-10 RX ADMIN — LINACLOTIDE 145 MCG: 145 CAPSULE, GELATIN COATED ORAL at 06:28

## 2025-07-10 RX ADMIN — HYDROMORPHONE HYDROCHLORIDE 1 MG: 1 INJECTION, SOLUTION INTRAMUSCULAR; INTRAVENOUS; SUBCUTANEOUS at 21:50

## 2025-07-10 RX ADMIN — PANTOPRAZOLE SODIUM 40 MG: 40 TABLET, DELAYED RELEASE ORAL at 06:28

## 2025-07-10 RX ADMIN — ACETAMINOPHEN 975 MG: 325 TABLET, FILM COATED ORAL at 14:58

## 2025-07-10 RX ADMIN — Medication 2 L/MIN: at 20:00

## 2025-07-10 RX ADMIN — HYDROMORPHONE HYDROCHLORIDE 1 MG: 1 INJECTION, SOLUTION INTRAMUSCULAR; INTRAVENOUS; SUBCUTANEOUS at 10:52

## 2025-07-10 RX ADMIN — GABAPENTIN 300 MG: 300 CAPSULE ORAL at 14:59

## 2025-07-10 RX ADMIN — Medication 4 L/MIN: at 08:00

## 2025-07-10 RX ADMIN — PIPERACILLIN SODIUM AND TAZOBACTAM SODIUM 3.38 G: 3; .375 INJECTION, SOLUTION INTRAVENOUS at 06:28

## 2025-07-10 RX ADMIN — GABAPENTIN 300 MG: 300 CAPSULE ORAL at 09:00

## 2025-07-10 RX ADMIN — ATORVASTATIN CALCIUM 20 MG: 20 TABLET, FILM COATED ORAL at 09:00

## 2025-07-10 RX ADMIN — HYDROMORPHONE HYDROCHLORIDE 1 MG: 1 INJECTION, SOLUTION INTRAMUSCULAR; INTRAVENOUS; SUBCUTANEOUS at 04:00

## 2025-07-10 ASSESSMENT — COGNITIVE AND FUNCTIONAL STATUS - GENERAL
WALKING IN HOSPITAL ROOM: A LITTLE
DRESSING REGULAR UPPER BODY CLOTHING: A LITTLE
TURNING FROM BACK TO SIDE WHILE IN FLAT BAD: A LITTLE
DRESSING REGULAR LOWER BODY CLOTHING: A LOT
DAILY ACTIVITIY SCORE: 19
HELP NEEDED FOR BATHING: A LITTLE
STANDING UP FROM CHAIR USING ARMS: A LOT
TOILETING: A LITTLE
CLIMB 3 TO 5 STEPS WITH RAILING: A LOT
MOBILITY SCORE: 16
MOVING TO AND FROM BED TO CHAIR: A LOT

## 2025-07-10 ASSESSMENT — PAIN SCALES - GENERAL
PAINLEVEL_OUTOF10: 0 - NO PAIN
PAINLEVEL_OUTOF10: 4
PAINLEVEL_OUTOF10: 7
PAINLEVEL_OUTOF10: 8

## 2025-07-10 ASSESSMENT — PAIN DESCRIPTION - LOCATION: LOCATION: ABDOMEN

## 2025-07-10 ASSESSMENT — PAIN - FUNCTIONAL ASSESSMENT
PAIN_FUNCTIONAL_ASSESSMENT: 0-10
PAIN_FUNCTIONAL_ASSESSMENT: 0-10

## 2025-07-10 ASSESSMENT — PAIN DESCRIPTION - DESCRIPTORS: DESCRIPTORS: ACHING

## 2025-07-10 NOTE — CARE PLAN
Problem: Pain - Adult  Goal: Verbalizes/displays adequate comfort level or baseline comfort level  Outcome: Progressing     Problem: Safety - Adult  Goal: Free from fall injury  Outcome: Progressing     Problem: Discharge Planning  Goal: Discharge to home or other facility with appropriate resources  Outcome: Progressing     Problem: Chronic Conditions and Co-morbidities  Goal: Patient's chronic conditions and co-morbidity symptoms are monitored and maintained or improved  Outcome: Progressing     Problem: Nutrition  Goal: Nutrient intake appropriate for maintaining nutritional needs  Outcome: Progressing     Problem: Fall/Injury  Goal: Be free from injury by end of the shift  Outcome: Progressing  Goal: Verbalize understanding of personal risk factors for fall in the hospital  Outcome: Progressing  Goal: Verbalize understanding of risk factor reduction measures to prevent injury from fall in the home  Outcome: Progressing  Goal: Use assistive devices by end of the shift  Outcome: Progressing  Goal: Pace activities to prevent fatigue by end of the shift  Outcome: Progressing     Problem: Patient is at risk for or has history of falls  Goal: Patient will not sustain a fall as a result of home safety mitigation  Outcome: Progressing     Problem: Pain  Goal: Takes deep breaths with improved pain control throughout the shift  Outcome: Progressing  Goal: Turns in bed with improved pain control throughout the shift  Outcome: Progressing  Goal: Walks with improved pain control throughout the shift  Outcome: Progressing  Goal: Performs ADL's with improved pain control throughout shift  Outcome: Progressing  Goal: Participates in PT with improved pain control throughout the shift  Outcome: Progressing  Goal: Free from opioid side effects throughout the shift  Outcome: Progressing  Goal: Free from acute confusion related to pain meds throughout the shift  Outcome: Progressing     Problem: Skin  Goal: Decreased wound  size/increased tissue granulation at next dressing change  Outcome: Progressing  Goal: Participates in plan/prevention/treatment measures  Outcome: Progressing  Goal: Prevent/manage excess moisture  Outcome: Progressing  Goal: Prevent/minimize sheer/friction injuries  Outcome: Progressing  Goal: Promote/optimize nutrition  Outcome: Progressing  Goal: Promote skin healing  Outcome: Progressing     Problem: Diabetes  Goal: Achieve decreasing blood glucose levels by end of shift  Outcome: Progressing  Goal: Increase stability of blood glucose readings by end of shift  Outcome: Progressing  Goal: Decrease in ketones present in urine by end of shift  Outcome: Progressing  Goal: Maintain electrolyte levels within acceptable range throughout shift  Outcome: Progressing  Goal: Maintain glucose levels >70mg/dl to <250mg/dl throughout shift  Outcome: Progressing  Goal: No changes in neurological exam by end of shift  Outcome: Progressing  Goal: Learn about and adhere to nutrition recommendations by end of shift  Outcome: Progressing  Goal: Vital signs within normal range for age by end of shift  Outcome: Progressing  Goal: Increase self care and/or family involovement by end of shift  Outcome: Progressing  Goal: Receive DSME education by end of shift  Outcome: Progressing   The patient's goals for the shift include      The clinical goals for the shift include Maintain safety; Maintain stable vitals

## 2025-07-10 NOTE — PROGRESS NOTES
"Jose Gaviria \"Crow\" is a 65 y.o. male on day 3 of admission presenting with Umbilical hernia without obstruction and without gangrene.    Subjective   Patient reports feeling better compared to yesterday. Denies nausea or vomiting. States he feels hungry and asking about a diet. +flatus, denies BM. Hgb dropped from 8 to 7 since yesterday afternoon. Plt 92.       Objective     Physical Exam  Constitutional:       General: He is not in acute distress.  Cardiovascular:      Rate and Rhythm: Normal rate.   Pulmonary:      Effort: Pulmonary effort is normal.   Abdominal:      General: There is distension.      Palpations: Abdomen is soft.      Tenderness: There is abdominal tenderness.      Comments: ABDIEL x 2 with serosang output, dressings with small amount of sang fluid. Abd binder in place. Surgical incision c/d/i   Skin:     General: Skin is warm and dry.   Neurological:      Mental Status: He is alert and oriented to person, place, and time.         Last Recorded Vitals  Blood pressure 150/71, pulse 66, temperature 35.8 °C (96.4 °F), temperature source Temporal, resp. rate 18, height 1.753 m (5' 9.02\"), weight 115 kg (254 lb 3.1 oz), SpO2 95%.  Intake/Output last 3 Shifts:  I/O last 3 completed shifts:  In: 1100 (9.5 mL/kg) [I.V.:1000 (8.7 mL/kg); Blood:100]  Out: 1775 (15.4 mL/kg) [Urine:1625 (0.4 mL/kg/hr); Drains:150]  Weight: 115.3 kg     Relevant Results            This patient currently has cardiac telemetry ordered; if you would like to modify or discontinue the telemetry order, click here to go to the orders activity to modify/discontinue the order.    Results for orders placed or performed during the hospital encounter of 07/07/25 (from the past 24 hours)   Coagulation Screen   Result Value Ref Range    Protime 13.9 (H) 9.8 - 12.4 seconds    INR 1.3 (H) 0.9 - 1.1    aPTT 29 26 - 36 seconds   SST TOP   Result Value Ref Range    Extra Tube Hold for add-ons.    Iron and TIBC   Result Value Ref Range    Iron 138 " 35 - 150 ug/dL    UIBC 213 110 - 370 ug/dL    TIBC 351 240 - 445 ug/dL    % Saturation 39 25 - 45 %   Ferritin   Result Value Ref Range    Ferritin 434 (H) 20 - 300 ng/mL   Hemoglobin and Hematocrit, Blood   Result Value Ref Range    Hemoglobin 8.1 (L) 13.5 - 17.5 g/dL    Hematocrit 25.3 (L) 41.0 - 52.0 %   Prepare Plasma: 1 Units   Result Value Ref Range    PRODUCT CODE [id number]     Unit Number [id number]     Unit ABO O     Unit RH POS     Dispense Status TR     Blood Expiration Date 7/14/2025  2:00:00 PM EDT     PRODUCT BLOOD TYPE 5100     UNIT VOLUME 219    CBC   Result Value Ref Range    WBC 2.7 (L) 4.4 - 11.3 x10*3/uL    nRBC 0.0 0.0 - 0.0 /100 WBCs    RBC 1.93 (L) 4.50 - 5.90 x10*6/uL    Hemoglobin 7.1 (L) 13.5 - 17.5 g/dL    Hematocrit 22.0 (L) 41.0 - 52.0 %     (H) 80 - 100 fL    MCH 36.8 (H) 26.0 - 34.0 pg    MCHC 32.3 32.0 - 36.0 g/dL    RDW 19.5 (H) 11.5 - 14.5 %    Platelets 91 (L) 150 - 450 x10*3/uL   Basic Metabolic Panel   Result Value Ref Range    Glucose 118 (H) 74 - 99 mg/dL    Sodium 138 136 - 145 mmol/L    Potassium 3.5 3.5 - 5.3 mmol/L    Chloride 104 98 - 107 mmol/L    Bicarbonate 30 21 - 32 mmol/L    Anion Gap 8 (L) 10 - 20 mmol/L    Urea Nitrogen 14 6 - 23 mg/dL    Creatinine 1.02 0.50 - 1.30 mg/dL    eGFR 81 >60 mL/min/1.73m*2    Calcium 8.2 (L) 8.6 - 10.3 mg/dL   Protime-INR   Result Value Ref Range    Protime 13.2 (H) 9.8 - 12.4 seconds    INR 1.2 (H) 0.9 - 1.1     *Note: Due to a large number of results and/or encounters for the requested time period, some results have not been displayed. A complete set of results can be found in Results Review.     XR chest 1 view  Result Date: 7/10/2025  Interpreted By:  Pal Sebastian, STUDY: XR CHEST 1 VIEW; 7/9/2025 6:42 pm   INDICATION: Signs/Symptoms:SOB.   COMPARISON: Chest x-ray 03/29/2023   ACCESSION NUMBER(S): [id number]   ORDERING CLINICIAN: SOLITARIO SOTO   TECHNIQUE: 1 view of the chest was performed.   FINDINGS: Trace  bibasal atelectasis. Trace left-sided pleural effusion. No pneumothorax.  The cardiomediastinal silhouette is stable.       1. Trace bibasal atelectasis. Trace left-sided pleural effusion.   Signed by: Pal Sebastian 7/10/2025 7:24 AM Dictation workstation:   [id number]                 Assessment & Plan  Umbilical hernia without obstruction and without gangrene    Ventral hernia without obstruction or gangrene    Recurrent umbilical hernia    Patient is a 66 y/o M with PMH significant for prostate CA, MDS, cerebral aneurysm, CAD, HTN, HLD, COPD/asthma (recent CT Chest with pulmonary fibrosis), and diverticulosis with recent diverticulitis who underwent elective rectrorectus hernia repair with Dr. Christopher on 7/7.     Procedures:  7/7 - Rectrorectus hernia repair with mesh with Dr. Christopher  7/8 - 1u pRBC and TXA x2     Assessment and Plan:     #S/p hernia repair  #Ileus  - Maintain abd binder at all times  - Maintain Jpx2    > Empty and record output Q4  - advance to [facility] today  - Multimodal pain management    > Scheduled tylenol and gabapentin    > Toradol held d/t bleeding    > Switch to dilaudid given ileus    > Stop robaxin given minimal benefit  - Repeat AM labs     #Post-op hematoma  #Acute blood loss anemia - worse  #Hemorrhagic shock - resolved  - s/p 1u pRBC, 1 pkt FFP and TXA x2, additional unit of PRBC and FFP ordered this am  - Hypotension resolved  - Hgb drifting down  - Repeat H&H in am  - ABDIEL output thinner  - Continue zosyn for now to prevent hematoma infection  - Hematology consulted given history of MDS with increased risk of perioperative bleeding    > Trend CBC and INR    > FFP if INR >2    > No indication for platelets at this time     Chronic conditions:  #CAD  #HTN  #HLD  #COPD  #Asthma  - Continue home meds as appropriate  - Appreciate ICU input     DVT ppx: lovenox held for now given bleeding. SCDs only.     Dispo: Not medically ready for discharge. Anticipate discharge to home medically  ready.      Patient seen and discussed with attending surgeon, Dr. Christopher.      I spent 15 minutes in the professional and overall care of this patient.      Kimberly Her, FRANSICO-CNP

## 2025-07-10 NOTE — CARE PLAN
The patient's goals for the shift include      The clinical goals for the shift include Pain management    Over the shift, the patient did not make progress toward the following goals. Barriers to progression include Pt has recent abdominal surgery. Recommendations to address these barriers include Maintain safety; Maintain stable vitals.     Referred To Oculoplastics For Closure Text (Leave Blank If You Do Not Want): After obtaining clear surgical margins the patient was sent to oculoplastics for surgical repair.  The patient understands they will receive post-surgical care and follow-up from the referring physician's office.

## 2025-07-11 LAB
ANION GAP SERPL CALC-SCNC: 13 MMOL/L (ref 10–20)
BUN SERPL-MCNC: 10 MG/DL (ref 6–23)
CALCIUM SERPL-MCNC: 8.5 MG/DL (ref 8.6–10.3)
CHLORIDE SERPL-SCNC: 105 MMOL/L (ref 98–107)
CO2 SERPL-SCNC: 28 MMOL/L (ref 21–32)
CREAT SERPL-MCNC: 0.88 MG/DL (ref 0.5–1.3)
EGFRCR SERPLBLD CKD-EPI 2021: >90 ML/MIN/1.73M*2
ERYTHROCYTE [DISTWIDTH] IN BLOOD BY AUTOMATED COUNT: 20.6 % (ref 11.5–14.5)
GLUCOSE SERPL-MCNC: 98 MG/DL (ref 74–99)
HCT VFR BLD AUTO: 25.4 % (ref 41–52)
HCT VFR BLD AUTO: 27.7 % (ref 41–52)
HGB BLD-MCNC: 8.2 G/DL (ref 13.5–17.5)
HGB BLD-MCNC: 8.9 G/DL (ref 13.5–17.5)
LABORATORY COMMENT REPORT: NORMAL
MCH RBC QN AUTO: 35.2 PG (ref 26–34)
MCHC RBC AUTO-ENTMCNC: 32.1 G/DL (ref 32–36)
MCV RBC AUTO: 110 FL (ref 80–100)
NRBC BLD-RTO: 0.7 /100 WBCS (ref 0–0)
PATH REPORT.FINAL DX SPEC: NORMAL
PATH REPORT.GROSS SPEC: NORMAL
PATH REPORT.RELEVANT HX SPEC: NORMAL
PATH REPORT.TOTAL CANCER: NORMAL
PATH REVIEW-CBC DIFFERENTIAL: NORMAL
PLATELET # BLD AUTO: 119 X10*3/UL (ref 150–450)
POTASSIUM SERPL-SCNC: 3.4 MMOL/L (ref 3.5–5.3)
RBC # BLD AUTO: 2.53 X10*6/UL (ref 4.5–5.9)
SODIUM SERPL-SCNC: 143 MMOL/L (ref 136–145)
WBC # BLD AUTO: 2.8 X10*3/UL (ref 4.4–11.3)

## 2025-07-11 PROCEDURE — 99233 SBSQ HOSP IP/OBS HIGH 50: CPT

## 2025-07-11 PROCEDURE — 99221 1ST HOSP IP/OBS SF/LOW 40: CPT | Performed by: STUDENT IN AN ORGANIZED HEALTH CARE EDUCATION/TRAINING PROGRAM

## 2025-07-11 PROCEDURE — 36415 COLL VENOUS BLD VENIPUNCTURE: CPT | Performed by: NURSE PRACTITIONER

## 2025-07-11 PROCEDURE — 2500000002 HC RX 250 W HCPCS SELF ADMINISTERED DRUGS (ALT 637 FOR MEDICARE OP, ALT 636 FOR OP/ED)

## 2025-07-11 PROCEDURE — 2500000001 HC RX 250 WO HCPCS SELF ADMINISTERED DRUGS (ALT 637 FOR MEDICARE OP)

## 2025-07-11 PROCEDURE — 1200000002 HC GENERAL ROOM WITH TELEMETRY DAILY

## 2025-07-11 PROCEDURE — 85014 HEMATOCRIT: CPT | Performed by: NURSE PRACTITIONER

## 2025-07-11 PROCEDURE — 2500000004 HC RX 250 GENERAL PHARMACY W/ HCPCS (ALT 636 FOR OP/ED)

## 2025-07-11 PROCEDURE — 85027 COMPLETE CBC AUTOMATED: CPT | Performed by: NURSE PRACTITIONER

## 2025-07-11 PROCEDURE — 94640 AIRWAY INHALATION TREATMENT: CPT

## 2025-07-11 PROCEDURE — 80048 BASIC METABOLIC PNL TOTAL CA: CPT | Performed by: NURSE PRACTITIONER

## 2025-07-11 PROCEDURE — 2500000005 HC RX 250 GENERAL PHARMACY W/O HCPCS: Performed by: SURGERY

## 2025-07-11 PROCEDURE — 87493 C DIFF AMPLIFIED PROBE: CPT | Mod: PARLAB | Performed by: STUDENT IN AN ORGANIZED HEALTH CARE EDUCATION/TRAINING PROGRAM

## 2025-07-11 RX ORDER — POTASSIUM CHLORIDE 20 MEQ/1
20 TABLET, EXTENDED RELEASE ORAL ONCE
Status: COMPLETED | OUTPATIENT
Start: 2025-07-11 | End: 2025-07-11

## 2025-07-11 RX ORDER — OXYCODONE HYDROCHLORIDE 5 MG/1
10 TABLET ORAL EVERY 4 HOURS PRN
Refills: 0 | Status: DISCONTINUED | OUTPATIENT
Start: 2025-07-11 | End: 2025-07-12

## 2025-07-11 RX ORDER — OXYCODONE HYDROCHLORIDE 5 MG/1
5 TABLET ORAL EVERY 4 HOURS PRN
Refills: 0 | Status: DISCONTINUED | OUTPATIENT
Start: 2025-07-11 | End: 2025-07-12

## 2025-07-11 RX ADMIN — GABAPENTIN 300 MG: 300 CAPSULE ORAL at 08:15

## 2025-07-11 RX ADMIN — PIPERACILLIN SODIUM AND TAZOBACTAM SODIUM 3.38 G: 3; .375 INJECTION, SOLUTION INTRAVENOUS at 06:21

## 2025-07-11 RX ADMIN — ACETAMINOPHEN 975 MG: 325 TABLET, FILM COATED ORAL at 20:13

## 2025-07-11 RX ADMIN — ACETAMINOPHEN 975 MG: 325 TABLET, FILM COATED ORAL at 15:10

## 2025-07-11 RX ADMIN — OXYCODONE HYDROCHLORIDE 10 MG: 5 TABLET ORAL at 17:30

## 2025-07-11 RX ADMIN — ATORVASTATIN CALCIUM 20 MG: 20 TABLET, FILM COATED ORAL at 08:15

## 2025-07-11 RX ADMIN — Medication 2 L/MIN: at 20:00

## 2025-07-11 RX ADMIN — PANTOPRAZOLE SODIUM 40 MG: 40 TABLET, DELAYED RELEASE ORAL at 06:21

## 2025-07-11 RX ADMIN — LOSARTAN POTASSIUM 100 MG: 50 TABLET, FILM COATED ORAL at 08:15

## 2025-07-11 RX ADMIN — IPRATROPIUM BROMIDE AND ALBUTEROL SULFATE 3 ML: .5; 3 SOLUTION RESPIRATORY (INHALATION) at 18:14

## 2025-07-11 RX ADMIN — DOCUSATE SODIUM 100 MG: 100 CAPSULE, LIQUID FILLED ORAL at 00:15

## 2025-07-11 RX ADMIN — PIPERACILLIN SODIUM AND TAZOBACTAM SODIUM 3.38 G: 3; .375 INJECTION, SOLUTION INTRAVENOUS at 20:11

## 2025-07-11 RX ADMIN — HYDROMORPHONE HYDROCHLORIDE 0.5 MG: 1 INJECTION, SOLUTION INTRAMUSCULAR; INTRAVENOUS; SUBCUTANEOUS at 08:16

## 2025-07-11 RX ADMIN — GABAPENTIN 300 MG: 300 CAPSULE ORAL at 20:13

## 2025-07-11 RX ADMIN — HYDROMORPHONE HYDROCHLORIDE 1 MG: 1 INJECTION, SOLUTION INTRAMUSCULAR; INTRAVENOUS; SUBCUTANEOUS at 04:26

## 2025-07-11 RX ADMIN — GABAPENTIN 300 MG: 300 CAPSULE ORAL at 15:10

## 2025-07-11 RX ADMIN — LINACLOTIDE 145 MCG: 145 CAPSULE, GELATIN COATED ORAL at 06:21

## 2025-07-11 RX ADMIN — OXYCODONE HYDROCHLORIDE 5 MG: 5 TABLET ORAL at 13:04

## 2025-07-11 RX ADMIN — PIPERACILLIN SODIUM AND TAZOBACTAM SODIUM 3.38 G: 3; .375 INJECTION, SOLUTION INTRAVENOUS at 00:12

## 2025-07-11 RX ADMIN — ACETAMINOPHEN 975 MG: 325 TABLET, FILM COATED ORAL at 08:15

## 2025-07-11 RX ADMIN — POTASSIUM CHLORIDE 20 MEQ: 1500 TABLET, EXTENDED RELEASE ORAL at 08:15

## 2025-07-11 RX ADMIN — PIPERACILLIN SODIUM AND TAZOBACTAM SODIUM 3.38 G: 3; .375 INJECTION, SOLUTION INTRAVENOUS at 12:56

## 2025-07-11 ASSESSMENT — COGNITIVE AND FUNCTIONAL STATUS - GENERAL
TOILETING: A LITTLE
MOBILITY SCORE: 16
MOBILITY SCORE: 16
DRESSING REGULAR LOWER BODY CLOTHING: A LOT
HELP NEEDED FOR BATHING: A LITTLE
CLIMB 3 TO 5 STEPS WITH RAILING: A LOT
WALKING IN HOSPITAL ROOM: A LITTLE
STANDING UP FROM CHAIR USING ARMS: A LOT
TURNING FROM BACK TO SIDE WHILE IN FLAT BAD: A LITTLE
WALKING IN HOSPITAL ROOM: A LITTLE
CLIMB 3 TO 5 STEPS WITH RAILING: A LOT
MOBILITY SCORE: 16
DRESSING REGULAR UPPER BODY CLOTHING: A LITTLE
WALKING IN HOSPITAL ROOM: A LITTLE
STANDING UP FROM CHAIR USING ARMS: A LOT
MOVING TO AND FROM BED TO CHAIR: A LOT
DAILY ACTIVITIY SCORE: 20
DRESSING REGULAR LOWER BODY CLOTHING: A LOT
DAILY ACTIVITIY SCORE: 19
TURNING FROM BACK TO SIDE WHILE IN FLAT BAD: A LITTLE
TURNING FROM BACK TO SIDE WHILE IN FLAT BAD: A LITTLE
STANDING UP FROM CHAIR USING ARMS: A LOT
HELP NEEDED FOR BATHING: A LITTLE
CLIMB 3 TO 5 STEPS WITH RAILING: A LOT
DRESSING REGULAR UPPER BODY CLOTHING: A LITTLE
MOVING TO AND FROM BED TO CHAIR: A LOT
MOVING TO AND FROM BED TO CHAIR: A LOT

## 2025-07-11 ASSESSMENT — PAIN DESCRIPTION - DESCRIPTORS
DESCRIPTORS: ACHING
DESCRIPTORS: ACHING

## 2025-07-11 ASSESSMENT — PAIN SCALES - GENERAL
PAINLEVEL_OUTOF10: 8
PAINLEVEL_OUTOF10: 0 - NO PAIN
PAINLEVEL_OUTOF10: 3
PAINLEVEL_OUTOF10: 0 - NO PAIN
PAINLEVEL_OUTOF10: 5 - MODERATE PAIN

## 2025-07-11 ASSESSMENT — PAIN - FUNCTIONAL ASSESSMENT
PAIN_FUNCTIONAL_ASSESSMENT: 0-10

## 2025-07-11 ASSESSMENT — PAIN DESCRIPTION - LOCATION: LOCATION: BACK

## 2025-07-11 NOTE — CARE PLAN
The patient's goals for the shift include      The clinical goals for the shift include Maintain safety; Maintain stable vitals    Over the shift, the patient did not make progress toward the following goals. Barriers to progression include Pt has recent surgical procedure to abdomen. Recommendations to address these barriers include Maintain safety; Maintain stable vitals.

## 2025-07-11 NOTE — CONSULTS
Reason For Consult  mm    History Of Present Illness  This is a 66 yo M with PMHx significant for GERD, prostate cancer, myelodysplastic syndrome, CAD, HTN, HLD, COPD, diverticulosis, seizures?(Not on meds), TAVR, and ventral hernia s/p scheduled hernia repair on 7/7 by Dr. Christopher. The patient was initially sent to the regular medical floor for recovery. However, he dumped ~400 ml in his right ABDIEL drain in 2 hours, with associated hypotension, prompting him to be transferred to the ICU for further resuscitation. His right ABDIEL drain is clamped, started on 1L fluid bolus and transferred to the ICU.  Was reuscitiated and monitored and doing well no issue no pain doing well      Past Medical History  He has a past medical history of Encounter for immunization (09/26/2016), Personal history of other diseases of the musculoskeletal system and connective tissue (04/14/2014), and Unspecified open wound of unspecified finger without damage to nail, subsequent encounter (06/11/2020).    Surgical History  He has a past surgical history that includes Other surgical history (01/28/2019); Other surgical history (01/28/2019); and Other surgical history (01/28/2019).     Social History  He reports that he quit smoking about 7 years ago. His smoking use included cigarettes. He has never used smokeless tobacco. He reports current alcohol use. He reports that he does not use drugs.    Family History  Family History[1]     Allergies  Ciprofloxacin    Review of Systems  A 10 point ROS was performed with the patient denying any complaint at this time aside from those listed in the HPI above.      Physical Exam  Constitutional:       General: He is not in acute distress.     Appearance: He is obese. He is not ill-appearing or toxic-appearing.      Comments: Uncomfortable appearing   HENT:      Mouth/Throat:      Mouth: Mucous membranes are moist.   Cardiovascular:      Rate and Rhythm: Normal rate and regular rhythm.      Heart sounds:  "Normal heart sounds.   Pulmonary:      Effort: Pulmonary effort is normal.      Breath sounds: Normal breath sounds.      Comments: 2L NC  Abdominal:      General: Bowel sounds are absent. There is distension (mild).      Palpations: Abdomen is soft.      Tenderness: There is abdominal tenderness (incisional, moderate). There is no guarding.      Comments: Dressing removed, incision well approximated with mild ecchymosis to left; no drainage or evidence of infection.   Upper abdomen quite tender with possible palpable hematoma.  JPx2 with thin blood, right ABDIEL slightly thinner than left.   Skin:     General: Skin is warm and dry.   Neurological:      Mental Status: He is alert and oriented to person, place, and time.   Psychiatric:         Mood and Affect: Mood normal.      Last Recorded Vitals  Blood pressure 162/72, pulse 68, temperature 36.6 °C (97.9 °F), temperature source Temporal, resp. rate 18, height 1.753 m (5' 9.02\"), weight 115 kg (254 lb 3.1 oz), SpO2 97%.    Relevant Results       Assessment/Plan     7/11: doing well no issues pian controlled, walkiing well, no issues    Neuro:  -No acute concerns  -CAM/ICU delirium precuations     CV:  #Hemorrhagic shock, resolved  #TAVR (2023)  #Hx of HTN, HLD  PLAN:  -active type and screen  -ABDIEL drains unclamped  -holding home antihypertensives, losartan, spironolactone   -continue home lipitor  -maintain MAP greater than 65     Respiratory:  #COPD, not in exacerbation  #Post-op respiratory insufficiency  -on NC, wean as tolerated     GI:  #Ventral hernia repair  #GERD  -multimodal pain regimen per surgery  -Clear liquid diet  -bowel regimen  -protonix     Endo:  -no acute concerns  -POCT glucose q4h      Renal:  -No acute concerns  -trend BMP, mag and phos     Hematological:  #Postop blood loss anemia  #Myelodysplastic syndrome  -Trend CBC  -SCDs for DVT ppx     ID:  -no acute concerns    I spent 55 minutes in the professional and overall care of this " patient.      Mio Leal DO         [1]   Family History  Problem Relation Name Age of Onset    Heart attack Mother      Heart attack Father

## 2025-07-11 NOTE — PROGRESS NOTES
Following up for dc planning.  Pt denies any needs, refusing hhc at discharge.  Told me he has his family whom can help him if he needs.  Nydia London RN [facility]

## 2025-07-11 NOTE — CARE PLAN
The patient's goals for the shift include  less diarrhea    The clinical goals for the shift include remove drains      Problem: Safety - Adult  Goal: Free from fall injury  Outcome: Progressing     Problem: Pain - Adult  Goal: Verbalizes/displays adequate comfort level or baseline comfort level  Outcome: Progressing     Problem: Chronic Conditions and Co-morbidities  Goal: Patient's chronic conditions and co-morbidity symptoms are monitored and maintained or improved  Outcome: Progressing

## 2025-07-11 NOTE — PROGRESS NOTES
"Internal Medicine Progress Note    PATIENT NAME: Jose Gaviria  MRN: [id number]  DATE: 7/11/2025       Subjective   No acute overnight events       Objective   /72 (BP Location: Right arm, Patient Position: Lying)   Pulse 68   Temp 36.6 °C (97.9 °F) (Temporal)   Resp 18   Ht 1.753 m (5' 9.02\")   Wt 115 kg (254 lb 3.1 oz)   SpO2 97%   BMI 37.52 kg/m²     General: Pleasant and cooperative  HEENT: Normocephalic, atraumatic, mucus membranes moist.  Eyes: EOMI  Neck:  Trachea midline.    Chest: Symmetric chest expansion, CTA bilaterally   CV:  Regular rate, regular rhythm.  Positive S1/S2.  Abdomen: soft, non-tender, non-distended, no guarding or peritoneal signs   Extremities:  No lower extremity edema  Neurological:  no obvious focal deficits   Psych: appropriate affect and behavior   Skin:  Warm and dry       Medications     Scheduled medications  Scheduled Medications[1]  Continuous medications  Continuous Medications[2]  PRN medications  PRN Medications[3]        Assessment / Plan   Jose Gaviria is a 65 y.o. male with a PMHx of anemia with MDS, prostate CA, SCC of skin, moderate concentric left ventricular hypertrophy, CAD, aortic stenosis s/p bioprosthetic replacement, essential HTN, hypercholesterolemia, COPD, T2DM, left SCA cerebral aneurysm s/p primary coiling, seizure disorder, umbilical hernia who presented to [facility] on 7/7/25 for an elective ventral hernia repair procedure per Dr. Christopher.      7/11: Hemoglobin appears stable at this time, resume home iron upon discharge.  Retake elevated showing proper compensation    Acute on chronic macrocytic anemia, component of acute blood loss and chronic iron deficiency  History of myelodysplastic syndrome  Ventral hernia repair  Hemorrhagic shock 2/2 postop blood loss, resolved     Trend daily CBC and monitor for bleeding and ABDIEL output (decreasing)  Maintain type and screen. Transfuse for Hgb <7  Though he already received iron transfusions, check iron " indices; if iron low I/S/O active bleeding, transfuse with more IV iron. Resume home oral iron supplementation on discharge  Serial coag panel checks; if INR > 2, consider transfusing with one unit FFP - no indication for platelet transfusion presently    This is a preliminary note written by the resident. Please wait for attending addendum for finalization of note and recommendations.    Dr. Jorden Canseco  Internal Medicine          [1] acetaminophen, 975 mg, oral, TID  atorvastatin, 20 mg, oral, Daily  docusate sodium, 100 mg, oral, BID  [Held by provider] enoxaparin, 40 mg, subcutaneous, q24h  gabapentin, 300 mg, oral, TID  [Transfer Hold] ipratropium-albuteroL, 3 mL, nebulization, TID  linaCLOtide, 145 mcg, oral, Daily before breakfast  losartan, 100 mg, oral, Daily  [Transfer Hold] oxygen, , inhalation, Continuous - Inhalation  pantoprazole, 40 mg, oral, Daily before breakfast  piperacillin-tazobactam, 3.375 g, intravenous, q6h  [Held by provider] spironolactone, 25 mg, oral, Daily  [2]    [3] PRN medications: ipratropium-albuteroL, naloxone, ondansetron ODT **OR** ondansetron, oxyCODONE, oxyCODONE

## 2025-07-11 NOTE — PROGRESS NOTES
"Jose Gaviria \"Brandon" is a 65 y.o. male on day 4 of admission presenting with Umbilical hernia without obstruction and without gangrene.    Subjective   Pt reports feeling better today. He is passing flatus and had 2 BMs. Tolerated CLD. Breathing feels okay, remains on 2L. Got u1 pRBC and u1 FFP yesterday and Hgb stabilized. JPs serosanguinous and low output.       Objective     Physical Exam  Constitutional:       General: He is not in acute distress.     Appearance: He is obese. He is not ill-appearing.   HENT:      Mouth/Throat:      Mouth: Mucous membranes are moist.   Cardiovascular:      Rate and Rhythm: Normal rate and regular rhythm.      Heart sounds: Normal heart sounds.   Pulmonary:      Effort: Pulmonary effort is normal. No respiratory distress.      Breath sounds: Normal breath sounds.      Comments: 2L NC  Abdominal:      General: Bowel sounds are normal. There is distension (improving).      Palpations: Abdomen is soft.      Tenderness: There is abdominal tenderness (incisional, improving).      Comments: ABDIEL x 2 with serosang output, dressing c/d/i   Skin:     General: Skin is warm and dry.   Neurological:      Mental Status: He is alert and oriented to person, place, and time.   Psychiatric:         Mood and Affect: Mood normal.         Behavior: Behavior normal.         Last Recorded Vitals  Blood pressure 128/61, pulse 64, temperature 36.8 °C (98.2 °F), temperature source Temporal, resp. rate 18, height 1.753 m (5' 9.02\"), weight 115 kg (254 lb 3.1 oz), SpO2 95%.  Intake/Output last 3 Shifts:  I/O last 3 completed shifts:  In: 1406.3 (12.2 mL/kg) [P.O.:120; I.V.:386.3 (3.3 mL/kg); Blood:900]  Out: 1425 (12.4 mL/kg) [Urine:1325 (0.3 mL/kg/hr); Drains:100]  Weight: 115.3 kg     Relevant Results    Results for orders placed or performed during the hospital encounter of 07/07/25 (from the past 24 hours)   Hemoglobin and hematocrit, blood   Result Value Ref Range    Hemoglobin 8.2 (L) 13.5 - 17.5 g/dL "    Hematocrit 25.4 (L) 41.0 - 52.0 %   Basic Metabolic Panel   Result Value Ref Range    Glucose 98 74 - 99 mg/dL    Sodium 143 136 - 145 mmol/L    Potassium 3.4 (L) 3.5 - 5.3 mmol/L    Chloride 105 98 - 107 mmol/L    Bicarbonate 28 21 - 32 mmol/L    Anion Gap 13 10 - 20 mmol/L    Urea Nitrogen 10 6 - 23 mg/dL    Creatinine 0.88 0.50 - 1.30 mg/dL    eGFR >90 >60 mL/min/1.73m*2    Calcium 8.5 (L) 8.6 - 10.3 mg/dL   CBC   Result Value Ref Range    WBC 2.8 (L) 4.4 - 11.3 x10*3/uL    nRBC 0.7 (H) 0.0 - 0.0 /100 WBCs    RBC 2.53 (L) 4.50 - 5.90 x10*6/uL    Hemoglobin 8.9 (L) 13.5 - 17.5 g/dL    Hematocrit 27.7 (L) 41.0 - 52.0 %     (H) 80 - 100 fL    MCH 35.2 (H) 26.0 - 34.0 pg    MCHC 32.1 32.0 - 36.0 g/dL    RDW 20.6 (H) 11.5 - 14.5 %    Platelets 119 (L) 150 - 450 x10*3/uL     *Note: Due to a large number of results and/or encounters for the requested time period, some results have not been displayed. A complete set of results can be found in Results Review.          Assessment/Plan   CAD, HTN, HLD, COPD/asthma (recent CT Chest with pulmonary fibrosis), and diverticulosis with recent diverticulitis who underwent elective rectrorectus hernia repair with Dr. Christopher on 7/7.     Procedures:  7/7 - Rectrorectus hernia repair with mesh with Dr. Christopher  7/8 - 1u pRBC and TXA x2  7/9 - 1u FFP  7/10 - 1u pRBC and 1u PLT     Assessment and Plan:     #S/p hernia repair  #Ileus - resolved  - Maintain abd binder at all times  - Maintain Jpx2    > Empty and record output Q4  - advance to CLD today  - Multimodal pain management    > Scheduled tylenol and gabapentin    > Toradol held d/t bleeding    > Resume oxycodone now that pt tolerating diet  - Repeat AM labs     #Post-op hematoma  #Acute blood loss anemia -improved  #Hemorrhagic shock - resolved  - s/p 2u pRBC, 1u FFP, 1u PLT, and TXA x2  - Hypotension resolved  - Hgb appropriately incremented after add'l unit of pRBC yesterday  - ABDIEL output thinner  - Continue zosyn  while admitted prevent hematoma infection  - Hematology consulted given history of MDS with increased risk of perioperative bleeding    > Trend CBC and INR     Chronic conditions:  #CAD  #HTN  #HLD  #COPD  #Asthma  - Continue home meds as appropriate  - Appreciate ICU input     DVT ppx: lovenox held for now given bleeding. SCDs only.    PT/OT     Dispo: Not medically ready for discharge. Anticipate discharge to home medically ready.      Patient seen and discussed with attending surgeon, Dr. Christopher.      Sofie Mccrary PA-C

## 2025-07-11 NOTE — TREATMENT PLAN
Patient seen and examined. Chart reviewed where relevant. Discussed findings and clinical plan with note author. Full note to follow.     Status post large ventral hernia repair 07/07 with retrorectus mesh placement, 20 x 20 Synecor. RLQ drain above mesh. LLQ drain in one of the large hernia sacs. Thin fascia. Prior mesh, excised near umbilical stalk. Hemostatic case. Complicated by bleeding primarily from RLQ mesh drain overnight on POD0, likely related to MDS and thrombocytopenia as operative field was dry during mesh placement. TXA x 2 and ICU transfer for hypotension. Resolved with one unit of blood and fluids. Lactemia resolved. No pressors required. O2 via NC for COPD at baseline. On telemetry floor from ICU on 07/09/2025.     Telemetry bed on floor. Vitals have been mostly stable.  Medicine and now Heme on consult. Stable HGB.  Tolerating FLD with flatus and BM.  Continue abdominal binder, four panel with tight closure.  No strikethrough in midline. Minimal tenderness. No firm tissue.  Continue zosyn while admitted. History of mesh infection. Hematoma increases probability of infection.  Anticipate discharge Monday.  Patient refusing SNF or HHC.  Still on O2, COPD history.    Tin Christopher MD, PhD  Available via [employer]   MD Maza

## 2025-07-12 LAB
ANION GAP SERPL CALC-SCNC: 9 MMOL/L (ref 10–20)
BUN SERPL-MCNC: 10 MG/DL (ref 6–23)
C DIF TOX TCDA+TCDB STL QL NAA+PROBE: NOT DETECTED
CALCIUM SERPL-MCNC: 8.4 MG/DL (ref 8.6–10.3)
CHLORIDE SERPL-SCNC: 109 MMOL/L (ref 98–107)
CO2 SERPL-SCNC: 28 MMOL/L (ref 21–32)
CREAT SERPL-MCNC: 0.86 MG/DL (ref 0.5–1.3)
EGFRCR SERPLBLD CKD-EPI 2021: >90 ML/MIN/1.73M*2
ERYTHROCYTE [DISTWIDTH] IN BLOOD BY AUTOMATED COUNT: 20.4 % (ref 11.5–14.5)
GLUCOSE SERPL-MCNC: 93 MG/DL (ref 74–99)
HCT VFR BLD AUTO: 28.2 % (ref 41–52)
HGB BLD-MCNC: 8.8 G/DL (ref 13.5–17.5)
MCH RBC QN AUTO: 34.6 PG (ref 26–34)
MCHC RBC AUTO-ENTMCNC: 31.2 G/DL (ref 32–36)
MCV RBC AUTO: 111 FL (ref 80–100)
NRBC BLD-RTO: 0 /100 WBCS (ref 0–0)
PLATELET # BLD AUTO: 104 X10*3/UL (ref 150–450)
POTASSIUM SERPL-SCNC: 3.6 MMOL/L (ref 3.5–5.3)
RBC # BLD AUTO: 2.54 X10*6/UL (ref 4.5–5.9)
SODIUM SERPL-SCNC: 142 MMOL/L (ref 136–145)
WBC # BLD AUTO: 2.5 X10*3/UL (ref 4.4–11.3)

## 2025-07-12 PROCEDURE — 2500000001 HC RX 250 WO HCPCS SELF ADMINISTERED DRUGS (ALT 637 FOR MEDICARE OP)

## 2025-07-12 PROCEDURE — 99232 SBSQ HOSP IP/OBS MODERATE 35: CPT | Performed by: STUDENT IN AN ORGANIZED HEALTH CARE EDUCATION/TRAINING PROGRAM

## 2025-07-12 PROCEDURE — 2500000001 HC RX 250 WO HCPCS SELF ADMINISTERED DRUGS (ALT 637 FOR MEDICARE OP): Performed by: STUDENT IN AN ORGANIZED HEALTH CARE EDUCATION/TRAINING PROGRAM

## 2025-07-12 PROCEDURE — 80048 BASIC METABOLIC PNL TOTAL CA: CPT | Performed by: NURSE PRACTITIONER

## 2025-07-12 PROCEDURE — 1200000002 HC GENERAL ROOM WITH TELEMETRY DAILY

## 2025-07-12 PROCEDURE — 2500000001 HC RX 250 WO HCPCS SELF ADMINISTERED DRUGS (ALT 637 FOR MEDICARE OP): Performed by: NURSE PRACTITIONER

## 2025-07-12 PROCEDURE — 2500000004 HC RX 250 GENERAL PHARMACY W/ HCPCS (ALT 636 FOR OP/ED): Performed by: NURSE PRACTITIONER

## 2025-07-12 PROCEDURE — 97162 PT EVAL MOD COMPLEX 30 MIN: CPT | Mod: GP

## 2025-07-12 PROCEDURE — 36415 COLL VENOUS BLD VENIPUNCTURE: CPT | Performed by: NURSE PRACTITIONER

## 2025-07-12 PROCEDURE — 97165 OT EVAL LOW COMPLEX 30 MIN: CPT | Mod: GO

## 2025-07-12 PROCEDURE — 85027 COMPLETE CBC AUTOMATED: CPT | Performed by: NURSE PRACTITIONER

## 2025-07-12 PROCEDURE — 2500000002 HC RX 250 W HCPCS SELF ADMINISTERED DRUGS (ALT 637 FOR MEDICARE OP, ALT 636 FOR OP/ED)

## 2025-07-12 PROCEDURE — 2500000005 HC RX 250 GENERAL PHARMACY W/O HCPCS

## 2025-07-12 RX ORDER — OXYCODONE HYDROCHLORIDE 5 MG/1
5 TABLET ORAL EVERY 4 HOURS PRN
Refills: 0 | Status: DISCONTINUED | OUTPATIENT
Start: 2025-07-12 | End: 2025-07-12

## 2025-07-12 RX ORDER — HYDROCODONE BITARTRATE AND ACETAMINOPHEN 10; 325 MG/1; MG/1
2 TABLET ORAL EVERY 6 HOURS PRN
Refills: 0 | Status: DISCONTINUED | OUTPATIENT
Start: 2025-07-12 | End: 2025-07-13

## 2025-07-12 RX ORDER — ACETAMINOPHEN 325 MG/1
650 TABLET ORAL 3 TIMES DAILY
Status: DISCONTINUED | OUTPATIENT
Start: 2025-07-12 | End: 2025-07-13

## 2025-07-12 RX ADMIN — LINACLOTIDE 145 MCG: 145 CAPSULE, GELATIN COATED ORAL at 06:44

## 2025-07-12 RX ADMIN — GABAPENTIN 300 MG: 300 CAPSULE ORAL at 09:32

## 2025-07-12 RX ADMIN — HYDROCODONE BITARTRATE AND ACETAMINOPHEN 2 TABLET: 10; 325 TABLET ORAL at 18:07

## 2025-07-12 RX ADMIN — HYDROCODONE BITARTRATE AND ACETAMINOPHEN 2 TABLET: 10; 325 TABLET ORAL at 11:28

## 2025-07-12 RX ADMIN — LOSARTAN POTASSIUM 100 MG: 50 TABLET, FILM COATED ORAL at 09:31

## 2025-07-12 RX ADMIN — OXYCODONE HYDROCHLORIDE 5 MG: 5 TABLET ORAL at 06:43

## 2025-07-12 RX ADMIN — OXYCODONE HYDROCHLORIDE 10 MG: 5 TABLET ORAL at 00:19

## 2025-07-12 RX ADMIN — Medication 21 PERCENT: at 20:58

## 2025-07-12 RX ADMIN — ACETAMINOPHEN 975 MG: 325 TABLET, FILM COATED ORAL at 09:31

## 2025-07-12 RX ADMIN — ATORVASTATIN CALCIUM 20 MG: 20 TABLET, FILM COATED ORAL at 09:32

## 2025-07-12 RX ADMIN — SPIRONOLACTONE 25 MG: 25 TABLET, FILM COATED ORAL at 09:31

## 2025-07-12 RX ADMIN — PIPERACILLIN SODIUM AND TAZOBACTAM SODIUM 3.38 G: 3; .375 INJECTION, SOLUTION INTRAVENOUS at 13:15

## 2025-07-12 RX ADMIN — PANTOPRAZOLE SODIUM 40 MG: 40 TABLET, DELAYED RELEASE ORAL at 06:44

## 2025-07-12 RX ADMIN — GABAPENTIN 300 MG: 300 CAPSULE ORAL at 14:57

## 2025-07-12 RX ADMIN — PIPERACILLIN SODIUM AND TAZOBACTAM SODIUM 3.38 G: 3; .375 INJECTION, SOLUTION INTRAVENOUS at 19:28

## 2025-07-12 RX ADMIN — PIPERACILLIN SODIUM AND TAZOBACTAM SODIUM 3.38 G: 3; .375 INJECTION, SOLUTION INTRAVENOUS at 01:07

## 2025-07-12 RX ADMIN — ACETAMINOPHEN 650 MG: 325 TABLET ORAL at 14:59

## 2025-07-12 RX ADMIN — GABAPENTIN 300 MG: 300 CAPSULE ORAL at 20:58

## 2025-07-12 RX ADMIN — ACETAMINOPHEN 650 MG: 325 TABLET ORAL at 20:58

## 2025-07-12 RX ADMIN — PIPERACILLIN SODIUM AND TAZOBACTAM SODIUM 3.38 G: 3; .375 INJECTION, SOLUTION INTRAVENOUS at 06:48

## 2025-07-12 RX ADMIN — Medication 1 L/MIN: at 08:00

## 2025-07-12 ASSESSMENT — COGNITIVE AND FUNCTIONAL STATUS - GENERAL
DAILY ACTIVITIY SCORE: 18
DRESSING REGULAR UPPER BODY CLOTHING: A LITTLE
HELP NEEDED FOR BATHING: A LOT
TOILETING: A LITTLE
MOBILITY SCORE: 23
CLIMB 3 TO 5 STEPS WITH RAILING: A LITTLE
DRESSING REGULAR LOWER BODY CLOTHING: A LOT

## 2025-07-12 ASSESSMENT — PAIN DESCRIPTION - DESCRIPTORS: DESCRIPTORS: ACHING

## 2025-07-12 ASSESSMENT — PAIN DESCRIPTION - LOCATION
LOCATION: BACK
LOCATION: BACK

## 2025-07-12 ASSESSMENT — PAIN SCALES - GENERAL
PAINLEVEL_OUTOF10: 2
PAINLEVEL_OUTOF10: 6
PAINLEVEL_OUTOF10: 8
PAINLEVEL_OUTOF10: 0 - NO PAIN
PAINLEVEL_OUTOF10: 8
PAINLEVEL_OUTOF10: 2
PAINLEVEL_OUTOF10: 0 - NO PAIN
PAINLEVEL_OUTOF10: 8
PAINLEVEL_OUTOF10: 5 - MODERATE PAIN

## 2025-07-12 ASSESSMENT — PAIN - FUNCTIONAL ASSESSMENT
PAIN_FUNCTIONAL_ASSESSMENT: 0-10

## 2025-07-12 ASSESSMENT — ACTIVITIES OF DAILY LIVING (ADL): BATHING_ASSISTANCE: MINIMAL

## 2025-07-12 NOTE — PROGRESS NOTES
"Physical Therapy    Physical Therapy Evaluation    Patient Name: Jose Gaviria \"Crow\"  MRN: [id number]  Department: [facility]  Room: [address] [place] [address]  Today's Date: 7/12/2025   Time Calculation  Start Time: 1201  Stop Time: 1228  Time Calculation (min): 27 min    Assessment/Plan   PT Assessment  PT Assessment Results: Decreased strength, Decreased endurance, Impaired balance, Decreased mobility (impaired ventilation / gas exchange)  Rehab Prognosis: Good  Barriers to Discharge Home:  (Decreased SpO2 with limited mobiliy)  Evaluation/Treatment Tolerance:  (limited by decreased SpO2)  Medical Staff Made Aware: Yes  Strengths: Ability to acquire knowledge, Housing layout, Premorbid level of function, Support of Caregivers  Barriers to Participation: Comorbidities  End of Session Communication: Bedside nurse    Assessment Comment: 66 y/o male admitted for underwent elective rectrorectus hernia repair with Dr. Christopher on 7/7 c/b bleeding and required transfusion. PMH as below and significant for COPD and per gen surg note: recent CT Chest with pulmonary fibrosis, also: MDS, prostate CA, SCC of skin and TAVR. PTA pt independent with ambulation and stair negotiation. On PT eval, pt required supervision for bed mobility, SBA for transfers with or without FWW and SBA for ambulation with FWW or no device, distances limited by decreased SpO2 (on room air to 80% with 10 feet ambulation, on 3L with FWW decreased to 84% ambulating 20 feet), RN notified, pt would benefit from RT consult for supplemental O2 assessment with ambulation. Pt would benefit from continued acute PT services and LOW intensity PT at d/c and progression to OP pulmonary rehab once medically cleared. Pt limited today by decreased SpO2, ventilation/ gas exchange.  End of Session Patient Position: Bed, 2 rail up (seated EOB)    IP OR SWING BED PT PLAN  Inpatient or Swing Bed: Inpatient  PT Plan  Treatment/Interventions: Bed mobility, Transfer training, Gait training, Stair " training, Balance training, Strengthening, Endurance training, Therapeutic exercise, Therapeutic activity, Home exercise program  PT Plan: Ongoing PT  PT Frequency: 4 times per week  PT Discharge Recommendations: Low intensity level of continued care (possible progression to pulmonary rehab when cleared by acute surgical team)  Equipment Recommended upon Discharge:  (pt has FWW)  PT Recommended Transfer Status: Assist x1  PT - OK to Discharge: Yes (when medically cleared)    Subjective     PT Visit Info:  PT Received On: 07/12/25  General Visit Information:  General  Reason for Referral: imapired mobility; 64 y/o male admitted for underwent elective rectrorectus hernia repair with Dr. Christopher on 7/7 c/b bleeding and required transfusion.  Referred By: Sofie Mccrary PA-C  Past Medical History Relevant to Rehab: PMH: CAD, HTN, HLD, COPD/asthma (recent CT Chest with pulmonary fibrosis), and diverticulosis  Family/Caregiver Present: No  Co-Treatment: OT  Co-Treatment Reason: to maximize progressive mobility  Prior to Session Communication: Bedside nurse  Patient Position Received: Bed, 2 rail up, Alarm off, not on at start of session  General Comment: Pt supine in bed with HOB elevated, on room air (however deasts with minimal activity, RN notified), abdominal binder on, 2 ABDIEL drains  Home Living:  Home Living  Home Living Comments: lives with wife and children, 3 ANDERS no rail, bed and bath on first floor (full bath), + glasses, -  (wife drives)  Prior Level of Function:  Prior Function Per Pt/Caregiver Report  Prior Function Comments: independent with ambulation, - falls, does all the yard work  Precautions:  Precautions  Medical Precautions: Abdominal precautions, Fall precautions, Oxygen therapy device and L/min (deasted with 3L via NC with ambulation to 84%, minimal ambulation distance 20 feet with FWW)       Vital Signs Comment: pre: 's, SpO2 88-93% on room air with deep breathing increased to  93%, ambulation in room wiht room air: Spo2 80% and required 1-2 min and 3L of O2 to improve to 94%, ambulation 20 feet x 1 trial with FWW, SpO2 84% on 3L, recovered to 92% with seated rest and able to be downtitrated to room air post PT session, RN notified of desaturation and need for RT consult for ambulation trial for supplemental O2     Objective   Pain:  Pain Assessment  Pain Assessment: 0-10  0-10 (Numeric) Pain Score: 0 - No pain  Cognition:  Cognition  Orientation Level: Oriented X4    General Assessments:    Activity Tolerance  Endurance: Tolerates less than 10 min exercise with changes in vital signs (decreased SpO2 with minimal ambulation even on 3L via NC)    Strength  Strength Comments: at least 3+/5 throughout (B) LE      Static Sitting Balance  Static Sitting-Comment/Number of Minutes: mod I  Dynamic Sitting Balance  Dynamic Sitting-Comments: mod I    Static Standing Balance  Static Standing-Comment/Number of Minutes: SBA with FWW or no device  Dynamic Standing Balance  Dynamic Standing-Comments: SBA with FWW or no device  Functional Assessments:    Bed Mobility  Bed Mobility: Yes  Bed Mobility 1  Bed Mobility Comments 1: SBA    Transfers  Transfer: Yes  Transfer 1  Trials/Comments 1: sit <> stand: SBA with or without AD    Ambulation/Gait Training  Ambulation/Gait Training Performed: Yes  Ambulation/Gait Training 1  Comments/Distance (ft) 1: 10 feet x 1 trial in room without AD, 20 feet x 1 trial with FWW into hallway; limited by decreased SpO2 with each trial; SBA; decreased flor, cues for paired breathing    Stairs  Stairs: No (d/t decreased SpO2)    Extremity/Trunk Assessments:    RLE   RLE : Within Functional Limits  LLE   LLE : Within Functional Limits  Outcome Measures:  [facility] Basic Mobility  Turning from your back to your side while in a flat bed without using bedrails: None  Moving from lying on your back to sitting on the side of a flat bed without using bedrails: None  Moving to and  from bed to chair (including a wheelchair): None  Standing up from a chair using your arms (e.g. wheelchair or bedside chair): None  To walk in hospital room: None  Climbing 3-5 steps with railing: A little  Basic Mobility - Total Score: 23    Encounter Problems       Encounter Problems (Active)       PT Problem       PT Goal 1       Start:  07/12/25       Pt will improve bed mobility to mod I to improve functional independence.            PT Goal 2       Start:  07/12/25       Pt will improve all functional transfers to mod I level of assist with least restrictive assistive device to increase independence with functional mobility.            PT Goal 3       Start:  07/12/25       Pt will increase ambulation distance to >/= 150 feet with supervision and least restrictive assistive device to improve safety and mobility in home and community, with SpO2 >/= 89% (goal per RN).            PT Goal 4       Start:  07/12/25       Pt will negotiate 3 steps with no hand rail with SBA assist to improve safety and independence with stair negotiation in home.            PT Goal 5       Start:  07/12/25       Pt will improve dynamic standing balance to >/= 5 minutes with supervision assist and least restrictive assistive device to decrease risk for falls.               Safety       LTG - Patient will adhere to hip precautions during ADL's and transfers       Start:  07/07/25            LTG - Patient will demonstrate safety requirements appropriate to situation/environment       Start:  07/07/25            LTG - Patient will utilize safety techniques       Start:  07/07/25            STG - Patient locks brakes on wheelchair       Start:  07/07/25            STG - Patient uses call light consistently to request assistance with transfers       Start:  07/07/25            STG - Patient uses gait belt during all transfers       Start:  07/07/25            Goal 1       Start:  07/07/25       Pt will improve bed mobility to mod I to  improve functional independence.            Goal 2       Start:  07/07/25            Goal 3       Start:  07/07/25                   Education Documentation  Precautions, taught by Leela Edmondson, PT at 7/12/2025  3:16 PM.  Learner: Patient  Readiness: Acceptance  Method: Explanation  Response: Verbalizes Understanding, Needs Reinforcement  Comment: SpO2, breathing exercise, paced breathing, use of FWW to maximize safety    Mobility Training, taught by Leela Edmondson, PT at 7/12/2025  3:16 PM.  Learner: Patient  Readiness: Acceptance  Method: Explanation  Response: Verbalizes Understanding, Needs Reinforcement  Comment: SpO2, breathing exercise, paced breathing, use of FWW to maximize safety    Education Comments  No comments found.

## 2025-07-12 NOTE — PROGRESS NOTES
"Jose Gaviria \"Brandon" is a 65 y.o. male on day 5 of admission presenting with Umbilical hernia without obstruction and without gangrene.    Subjective   Doin gwell, states pain no controlled requesting home dose norco       Objective     Physical Exam  Constitutional:       Appearance: Normal appearance.   HENT:      Head: Normocephalic and atraumatic.      Right Ear: Tympanic membrane and ear canal normal.      Left Ear: Tympanic membrane and ear canal normal.      Mouth/Throat:      Mouth: Mucous membranes are moist.      Pharynx: Oropharynx is clear.   Eyes:      Extraocular Movements: Extraocular movements intact.      Conjunctiva/sclera: Conjunctivae normal.      Pupils: Pupils are equal, round, and reactive to light.   Cardiovascular:      Rate and Rhythm: Normal rate and regular rhythm.      Pulses: Normal pulses.      Heart sounds: Normal heart sounds.   Pulmonary:      Effort: Pulmonary effort is normal.      Breath sounds: Normal breath sounds.   Abdominal:      General: Abdomen is flat. Bowel sounds are normal.      Palpations: Abdomen is soft.   Musculoskeletal:         General: Normal range of motion.      Cervical back: Normal range of motion and neck supple.   Skin:     General: Skin is warm and dry.      Capillary Refill: Capillary refill takes 2 to 3 seconds.   Neurological:      General: No focal deficit present.      Mental Status: He is alert and oriented to person, place, and time. Mental status is at baseline.   Psychiatric:         Mood and Affect: Mood normal.         Behavior: Behavior normal.         Thought Content: Thought content normal.         Judgment: Judgment normal.         Last Recorded Vitals  Blood pressure 154/71, pulse 87, temperature 36 °C (96.8 °F), temperature source Temporal, resp. rate 18, height 1.753 m (5' 9.02\"), weight 115 kg (254 lb 3.1 oz), SpO2 94%.  Intake/Output last 3 Shifts:  I/O last 3 completed shifts:  In: 1080 (9.4 mL/kg) [P.O.:480; Blood:600]  Out: 692 (6 " mL/kg) [Urine:595 (0.1 mL/kg/hr); Drains:97]  Weight: 115.3 kg     Relevant Results            This patient currently has cardiac telemetry ordered; if you would like to modify or discontinue the telemetry order, click here to go to the orders activity to modify/discontinue the order.                 Assessment & Plan  Umbilical hernia without obstruction and without gangrene    Ventral hernia without obstruction or gangrene    Recurrent umbilical hernia    7/11: doing well no issues pian controlled, walkiing well, no issues     Neuro:  -No acute concerns  -CAM/ICU delirium precuations     CV:  #Hemorrhagic shock, resolved  #TAVR (2023)  #Hx of HTN, HLD  PLAN:  -active type and screen  -ABDIEL drains unclamped  -holding home antihypertensives, losartan, spironolactone   -continue home lipitor  -maintain MAP greater than 65     Respiratory:  #COPD, not in exacerbation  #Post-op respiratory insufficiency  -on NC, wean as tolerated     GI:  #Ventral hernia repair  #GERD  -multimodal pain regimen per surgery  -Clear liquid diet  -bowel regimen  -protonix     Endo:  -no acute concerns  -POCT glucose q4h      Renal:  -No acute concerns  -trend BMP, mag and phos     Hematological:  #Postop blood loss anemia  #Myelodysplastic syndrome  -Trend CBC  -SCDs for DVT ppx     ID:  -no acute concerns    7/12: doing well no issues, overall will stop oxy and switch to norco home dose      I spent 35 minutes in the professional and overall care of this patient.      Mio Leal, DO

## 2025-07-12 NOTE — PROGRESS NOTES
"Occupational Therapy    Occupational Therapy    Evaluation    Patient Name: Jose Gaviria \"Crow\"  MRN: [id number]  Today's Date: 7/12/2025  Time Calculation  Start Time: 1200  Stop Time: 1227  Time Calculation (min): 27 min  915/915-A    Assessment  IP OT Assessment  OT Assessment: Pt. presents with a decline in self-care, mobility, and endurance and would benefit from skilled OT services to maximize independence and promote return to prior level of function.  Prognosis: Good  Barriers to Discharge Home: No anticipated barriers  Evaluation/Treatment Tolerance: Patient tolerated treatment well  Medical Staff Made Aware: Yes  End of Session Communication: Bedside nurse  End of Session Patient Position: Bed, 2 rail up, Alarm off, not on at start of session (seated eob with lunch tray, call light in reach)    Plan:  Treatment Interventions: ADL retraining, Functional transfer training, Endurance training, Patient/family training, Equipment evaluation/education  OT Frequency: 2 times per week  OT Discharge Recommendations: Low intensity level of continued care  Equipment Recommended upon Discharge:  (DME for bathroom-tubseat, HHS, grab bar, elevated toilet with grab bar)  OT Recommended Transfer Status: Stand by assist  OT - OK to Discharge: Yes (once cleared by medical team)    Subjective     Current Problem:  1. Umbilical hernia without obstruction and without gangrene        2. Ventral hernia without obstruction or gangrene  Surgical Pathology Exam    Surgical Pathology Exam      3. Recurrent umbilical hernia  Surgical Pathology Exam    Surgical Pathology Exam          General:  General  Reason for Referral: OT eval and treat for adls  Referred By: Sofie Mccrary PA-C  Past Medical History Relevant to Rehab: This is a 64 yo M with PMHx significant for GERD, prostate cancer, myelodysplastic syndrome, CAD, HTN, HLD, COPD, diverticulosis, seizures?(Not on meds), TAVR, and ventral hernia s/p scheduled hernia repair " on 7/7 by Dr. Christopher. The patient was initially sent to the regular medical floor for recovery. However, he dumped ~400 ml in his right ABDIEL drain in 2 hours, with associated hypotension, prompting him to be transferred to the ICU.  Family/Caregiver Present: No  Co-Treatment: PT  Co-Treatment Reason: to maximize pt. safety and mobility  Prior to Session Communication: Bedside nurse  Patient Position Received: Bed, 2 rail up, Alarm off, not on at start of session  General Comment: pt. pleasant and agreeable to therapy evaluation.Pt supine in bed with HOB elevated, on room air (however desats with minimal activity, RN notified), abdominal binder on, 2 ABDIEL drains    Precautions:  Medical Precautions: Fall precautions  Precautions Comment: deasted with 3L via NC with ambulation to 84%, minimal ambulation distance 20 feet with FWW    Pain:  Pain Assessment  Pain Assessment: 0-10  0-10 (Numeric) Pain Score: 0 - No pain    Objective     Cognition:  Overall Cognitive Status: Within Functional Limits  Orientation Level: Oriented X4     Home Living:  Type of Home: House  Lives With: Spouse (and son and dtr)  Home Adaptive Equipment: Walker rolling or standard, Cane, Reacher  Home Layout: One level  Home Access: Stairs to enter with rails (3)  Bathroom Shower/Tub: Tub/shower unit (stands in tub to shower)     Prior Function:  Level of [place]: Independent with ADLs and functional transfers, Independent with homemaking with ambulation  Ambulatory Assistance: Independent (no device)  Vocational: Retired  Hand Dominance: Left  Prior Function Comments: ind. with yardwork. does not drive. does cleaning. spouse does driving/cooking/laundry    IADL History:  IADL Comments: pt. ind. with cleaning/yardwork. spouse works, but does cooking/laundry    ADL:  Eating Assistance: Independent  Grooming Assistance: Independent  Bathing Assistance: Minimal  UE Dressing Assistance: Independent  LE Dressing Assistance: Maximal  Toileting  Assistance with Device: Modified independent    Activity Tolerance:  Endurance: Decreased tolerance for upright activites    Bed Mobility/Transfers:   Bed Mobility  Bed Mobility:  (supervision supine to sitting at eob)  Transfers  Transfer:  (sba sit to stand)    Ambulation/Gait Training:  Functional Mobility  Functional Mobility Performed:  (pt. ambulated with rolling walker with sba, decreased endurance)    Sitting Balance:  Static Sitting Balance  Static Sitting-Level of Assistance: Independent    Standing Balance:  Static Standing Balance  Static Standing-Level of Assistance: Modified Independent    Sensation:  Light Touch: No apparent deficits    Strength:  Strength Comments: bue arom wfl. bue strength 4/5    Coordination:  Movements are Fluid and Coordinated: Yes     Hand Function:  Hand Function  Gross Grasp: Functional  Coordination: Functional    Outcome Measures: [facility] Daily Activity  Putting on and taking off regular lower body clothing: A lot  Bathing (including washing, rinsing, drying): A lot  Putting on and taking off regular upper body clothing: A little  Toileting, which includes using toilet, bedpan or urinal: A little  Taking care of personal grooming such as brushing teeth: None  Eating Meals: None  Daily Activity - Total Score: 18     EDUCATION:  Education  Individual(s) Educated: Patient  Education Provided:  (hand-outs on pursed lipped breathing/energy saving tips)  Education Documentation  Handouts, taught by Diann Ramesh, OT at 7/12/2025  3:29 PM.  Learner: Patient  Readiness: Acceptance  Method: Explanation  Response: Verbalizes Understanding    Education Comments  No comments found.        Goals:   Encounter Problems       Encounter Problems (Active)       OT Goals       Pt. will perform bathing and dressing ind.'ly using adaptive equipment as needed.  (Progressing)       Start:  07/12/25    Expected End:  07/26/25            Pt. will perform toileting ind.'ly using dme/adaptive equipment  as needed.  (Progressing)       Start:  07/12/25    Expected End:  07/26/25            Pt. will demonstrate knowledge and application of energy conservation/work simplification/purse-lipped breathing techniques during adl tasks.  (Progressing)       Start:  07/12/25    Expected End:  07/26/25            Pt. will perform bed mobility/chair/commode/tubseat transfers ind.'ly. (Progressing)       Start:  07/12/25    Expected End:  07/26/25            Pt. will perform functional mobility with rolling walker ind.'ly to access bathroom. (Progressing)       Start:  07/12/25    Expected End:  07/26/25

## 2025-07-12 NOTE — CARE PLAN
The patient's goals for the shift include pain management     The clinical goals for the shift include maintain pain of less than 10 by end of shift        Problem: Pain - Adult  Goal: Verbalizes/displays adequate comfort level or baseline comfort level  Outcome: Progressing     Problem: Safety - Adult  Goal: Free from fall injury  Outcome: Progressing     Problem: Discharge Planning  Goal: Discharge to home or other facility with appropriate resources  Outcome: Progressing     Problem: Pain  Goal: Walks with improved pain control throughout the shift  Outcome: Progressing     Problem: Pain  Goal: Takes deep breaths with improved pain control throughout the shift  Outcome: Progressing

## 2025-07-12 NOTE — PROGRESS NOTES
"Jose Gaviria \"Brandon" is a 65 y.o. male on day 5 of admission presenting with Umbilical hernia without obstruction and without gangrene.    Subjective   Pt continues to improve. Weaned to RA today. Pain is improving and he has good bowel function. Tolerated FLD. PCP switched him from oxy to home norco today. Bps elevated, spironolactone resumed. ABDIEL output decreasing    Rt ABDIEL: 22 ml/24 hrs  Lt ABDIEL: 55 ml/24 hrs       Objective     Physical Exam  Constitutional:       General: He is not in acute distress.     Appearance: He is obese. He is not ill-appearing.   HENT:      Mouth/Throat:      Mouth: Mucous membranes are moist.   Cardiovascular:      Rate and Rhythm: Normal rate and regular rhythm.      Heart sounds: Normal heart sounds.   Pulmonary:      Effort: Pulmonary effort is normal. No respiratory distress.      Breath sounds: Normal breath sounds.      Comments: RA  Abdominal:      General: Bowel sounds are normal. There is no distension.      Palpations: Abdomen is soft.      Tenderness: There is abdominal tenderness (incisional, mild).      Comments: ABDIEL x 2 with serosang output, dressing c/d/i   Skin:     General: Skin is warm and dry.   Neurological:      Mental Status: He is alert and oriented to person, place, and time.   Psychiatric:         Mood and Affect: Mood normal.         Behavior: Behavior normal.         Last Recorded Vitals  Blood pressure 130/65, pulse 77, temperature 36.2 °C (97.2 °F), temperature source Temporal, resp. rate 18, height 1.753 m (5' 9.02\"), weight 115 kg (254 lb 3.1 oz), SpO2 90%.  Intake/Output last 3 Shifts:  I/O last 3 completed shifts:  In: 1080 (9.4 mL/kg) [P.O.:480; Blood:600]  Out: 692 (6 mL/kg) [Urine:595 (0.1 mL/kg/hr); Drains:97]  Weight: 115.3 kg     Relevant Results    Results for orders placed or performed during the hospital encounter of 07/07/25 (from the past 24 hours)   CBC   Result Value Ref Range    WBC 2.5 (L) 4.4 - 11.3 x10*3/uL    nRBC 0.0 0.0 - 0.0 /100 WBCs "    RBC 2.54 (L) 4.50 - 5.90 x10*6/uL    Hemoglobin 8.8 (L) 13.5 - 17.5 g/dL    Hematocrit 28.2 (L) 41.0 - 52.0 %     (H) 80 - 100 fL    MCH 34.6 (H) 26.0 - 34.0 pg    MCHC 31.2 (L) 32.0 - 36.0 g/dL    RDW 20.4 (H) 11.5 - 14.5 %    Platelets 104 (L) 150 - 450 x10*3/uL   Basic Metabolic Panel   Result Value Ref Range    Glucose 93 74 - 99 mg/dL    Sodium 142 136 - 145 mmol/L    Potassium 3.6 3.5 - 5.3 mmol/L    Chloride 109 (H) 98 - 107 mmol/L    Bicarbonate 28 21 - 32 mmol/L    Anion Gap 9 (L) 10 - 20 mmol/L    Urea Nitrogen 10 6 - 23 mg/dL    Creatinine 0.86 0.50 - 1.30 mg/dL    eGFR >90 >60 mL/min/1.73m*2    Calcium 8.4 (L) 8.6 - 10.3 mg/dL     *Note: Due to a large number of results and/or encounters for the requested time period, some results have not been displayed. A complete set of results can be found in Results Review.          Assessment/Plan   CAD, HTN, HLD, COPD/asthma (recent CT Chest with pulmonary fibrosis), and diverticulosis with recent diverticulitis who underwent elective rectrorectus hernia repair with Dr. Christopher on 7/7.     Procedures:  7/7 - Rectrorectus hernia repair with mesh with Dr. Christopher  7/8 - 1u pRBC and TXA x2  7/9 - 1u FFP  7/10 - 1u pRBC and 1u PLT     Assessment and Plan:     #S/p hernia repair  - Maintain abd binder at all times  - Maintain Jpx2    > Empty and record output Q12    > Potentially remove prior to discharge, pending output today/tomorrow  - advance to regular diet  - Multimodal pain management    > Scheduled tylenol and gabapentin - tylenol dose reduced since norco resumed    > No NSAIDs d/t bleeding    > PCP switched to home norco dose     #Post-op hematoma  #Acute blood loss anemia -improved  - s/p 2u pRBC, 1u FFP, 1u PLT, and TXA x2  - Hgb stable and ABDIEL x2 serosanguinous  - Continue zosyn while admitted prevent hematoma infection  - Hematology consulted given history of MDS with increased risk of perioperative bleeding    > Trend CBC and INR     Chronic  conditions:  #CAD  #HTN  #HLD  #COPD  #Asthma  - Continue home meds as appropriate    > Aldactone resumed today  - Appreciate medicine input     DVT ppx: lovenox held for now given bleeding. SCDs only.    Dispo: Keep one more day, then discharge tomorrow if CBC still stable and pt tolerating regular diet. Anticipate discharge to home medically ready, pt refusing HHC.      Patient seen and discussed with attending surgeon on call, Dr. Giles.      Sofie Mccrary PA-C

## 2025-07-13 VITALS
HEIGHT: 69 IN | BODY MASS INDEX: 37.65 KG/M2 | RESPIRATION RATE: 18 BRPM | DIASTOLIC BLOOD PRESSURE: 72 MMHG | OXYGEN SATURATION: 90 % | TEMPERATURE: 97.3 F | SYSTOLIC BLOOD PRESSURE: 153 MMHG | WEIGHT: 254.19 LBS | HEART RATE: 73 BPM

## 2025-07-13 PROBLEM — K42.9 RECURRENT UMBILICAL HERNIA: Status: RESOLVED | Noted: 2025-05-22 | Resolved: 2025-07-13

## 2025-07-13 PROBLEM — K43.9 VENTRAL HERNIA WITHOUT OBSTRUCTION OR GANGRENE: Status: RESOLVED | Noted: 2025-05-22 | Resolved: 2025-07-13

## 2025-07-13 PROBLEM — K42.9 UMBILICAL HERNIA WITHOUT OBSTRUCTION AND WITHOUT GANGRENE: Status: RESOLVED | Noted: 2025-07-07 | Resolved: 2025-07-13

## 2025-07-13 LAB
ERYTHROCYTE [DISTWIDTH] IN BLOOD BY AUTOMATED COUNT: 21.2 % (ref 11.5–14.5)
HCT VFR BLD AUTO: 25.8 % (ref 41–52)
HGB BLD-MCNC: 8.5 G/DL (ref 13.5–17.5)
HOLD SPECIMEN: NORMAL
MCH RBC QN AUTO: 36.5 PG (ref 26–34)
MCHC RBC AUTO-ENTMCNC: 32.9 G/DL (ref 32–36)
MCV RBC AUTO: 111 FL (ref 80–100)
NRBC BLD-RTO: 0.8 /100 WBCS (ref 0–0)
PLATELET # BLD AUTO: 115 X10*3/UL (ref 150–450)
RBC # BLD AUTO: 2.33 X10*6/UL (ref 4.5–5.9)
WBC # BLD AUTO: 2.6 X10*3/UL (ref 4.4–11.3)

## 2025-07-13 PROCEDURE — 2500000005 HC RX 250 GENERAL PHARMACY W/O HCPCS

## 2025-07-13 PROCEDURE — 36415 COLL VENOUS BLD VENIPUNCTURE: CPT

## 2025-07-13 PROCEDURE — 2500000001 HC RX 250 WO HCPCS SELF ADMINISTERED DRUGS (ALT 637 FOR MEDICARE OP): Performed by: NURSE PRACTITIONER

## 2025-07-13 PROCEDURE — 2500000004 HC RX 250 GENERAL PHARMACY W/ HCPCS (ALT 636 FOR OP/ED): Performed by: NURSE PRACTITIONER

## 2025-07-13 PROCEDURE — 2500000001 HC RX 250 WO HCPCS SELF ADMINISTERED DRUGS (ALT 637 FOR MEDICARE OP): Performed by: STUDENT IN AN ORGANIZED HEALTH CARE EDUCATION/TRAINING PROGRAM

## 2025-07-13 PROCEDURE — 99232 SBSQ HOSP IP/OBS MODERATE 35: CPT | Performed by: STUDENT IN AN ORGANIZED HEALTH CARE EDUCATION/TRAINING PROGRAM

## 2025-07-13 PROCEDURE — 2500000001 HC RX 250 WO HCPCS SELF ADMINISTERED DRUGS (ALT 637 FOR MEDICARE OP)

## 2025-07-13 PROCEDURE — 2500000002 HC RX 250 W HCPCS SELF ADMINISTERED DRUGS (ALT 637 FOR MEDICARE OP, ALT 636 FOR OP/ED)

## 2025-07-13 PROCEDURE — 99238 HOSP IP/OBS DSCHRG MGMT 30/<: CPT

## 2025-07-13 PROCEDURE — 85027 COMPLETE CBC AUTOMATED: CPT

## 2025-07-13 RX ORDER — ACETAMINOPHEN 325 MG/1
325 TABLET ORAL 3 TIMES DAILY
Status: DISCONTINUED | OUTPATIENT
Start: 2025-07-13 | End: 2025-07-13

## 2025-07-13 RX ORDER — OXYCODONE HYDROCHLORIDE 5 MG/1
5 TABLET ORAL EVERY 6 HOURS PRN
Refills: 0 | Status: DISCONTINUED | OUTPATIENT
Start: 2025-07-13 | End: 2025-07-13 | Stop reason: HOSPADM

## 2025-07-13 RX ORDER — GABAPENTIN 300 MG/1
300 CAPSULE ORAL 3 TIMES DAILY
Qty: 9 CAPSULE | Refills: 0 | Status: SHIPPED | OUTPATIENT
Start: 2025-07-13 | End: 2025-07-16

## 2025-07-13 RX ORDER — ACETAMINOPHEN 325 MG/1
975 TABLET ORAL 3 TIMES DAILY
Status: DISCONTINUED | OUTPATIENT
Start: 2025-07-13 | End: 2025-07-13 | Stop reason: HOSPADM

## 2025-07-13 RX ADMIN — SPIRONOLACTONE 25 MG: 25 TABLET, FILM COATED ORAL at 08:47

## 2025-07-13 RX ADMIN — LOSARTAN POTASSIUM 100 MG: 50 TABLET, FILM COATED ORAL at 08:47

## 2025-07-13 RX ADMIN — OXYCODONE HYDROCHLORIDE 5 MG: 5 TABLET ORAL at 07:16

## 2025-07-13 RX ADMIN — PANTOPRAZOLE SODIUM 40 MG: 40 TABLET, DELAYED RELEASE ORAL at 07:16

## 2025-07-13 RX ADMIN — Medication 21 PERCENT: at 08:00

## 2025-07-13 RX ADMIN — LINACLOTIDE 145 MCG: 145 CAPSULE, GELATIN COATED ORAL at 07:16

## 2025-07-13 RX ADMIN — OXYCODONE HYDROCHLORIDE 5 MG: 5 TABLET ORAL at 13:17

## 2025-07-13 RX ADMIN — ACETAMINOPHEN 975 MG: 325 TABLET ORAL at 15:34

## 2025-07-13 RX ADMIN — GABAPENTIN 300 MG: 300 CAPSULE ORAL at 15:34

## 2025-07-13 RX ADMIN — PIPERACILLIN SODIUM AND TAZOBACTAM SODIUM 3.38 G: 3; .375 INJECTION, SOLUTION INTRAVENOUS at 07:16

## 2025-07-13 RX ADMIN — GABAPENTIN 300 MG: 300 CAPSULE ORAL at 08:47

## 2025-07-13 RX ADMIN — ATORVASTATIN CALCIUM 20 MG: 20 TABLET, FILM COATED ORAL at 08:47

## 2025-07-13 RX ADMIN — PIPERACILLIN SODIUM AND TAZOBACTAM SODIUM 3.38 G: 3; .375 INJECTION, SOLUTION INTRAVENOUS at 13:17

## 2025-07-13 RX ADMIN — HYDROCODONE BITARTRATE AND ACETAMINOPHEN 2 TABLET: 10; 325 TABLET ORAL at 00:08

## 2025-07-13 RX ADMIN — PIPERACILLIN SODIUM AND TAZOBACTAM SODIUM 3.38 G: 3; .375 INJECTION, SOLUTION INTRAVENOUS at 01:41

## 2025-07-13 ASSESSMENT — PAIN - FUNCTIONAL ASSESSMENT
PAIN_FUNCTIONAL_ASSESSMENT: 0-10

## 2025-07-13 ASSESSMENT — PAIN SCALES - GENERAL
PAINLEVEL_OUTOF10: 8
PAINLEVEL_OUTOF10: 8
PAINLEVEL_OUTOF10: 10 - WORST POSSIBLE PAIN
PAINLEVEL_OUTOF10: 4

## 2025-07-13 ASSESSMENT — PAIN DESCRIPTION - LOCATION: LOCATION: ABDOMEN

## 2025-07-13 NOTE — CARE PLAN
The patient's goals for the shift include pain management and discharge     The clinical goals for the shift include remain safe and HDS for discharge      Problem: Discharge Planning  Goal: Discharge to home or other facility with appropriate resources  Outcome: Progressing     Problem: Safety - Adult  Goal: Free from fall injury  Outcome: Progressing     Problem: Pain - Adult  Goal: Verbalizes/displays adequate comfort level or baseline comfort level  Outcome: Progressing     Problem: Fall/Injury  Goal: Be free from injury by end of the shift  Outcome: Progressing     Problem: Pain  Goal: Takes deep breaths with improved pain control throughout the shift  Outcome: Progressing

## 2025-07-13 NOTE — CARE PLAN
The patient's goals for the shift include  VSS      The clinical goals for the shift include maintain pain of less than 10 by end of shift    Over the shift, the patient did not make progress toward the following goals. Barriers to progression include n/a. Recommendations to address these barriers include n/a.

## 2025-07-13 NOTE — PROGRESS NOTES
"Jose Gaviria \"Brandon" is a 65 y.o. male on day 6 of admission presenting with Umbilical hernia without obstruction and without gangrene.    Subjective   No issues, plns to go home today       Objective     Physical Exam  Constitutional:       Appearance: Normal appearance.   HENT:      Head: Normocephalic and atraumatic.      Right Ear: Tympanic membrane and ear canal normal.      Left Ear: Tympanic membrane and ear canal normal.      Mouth/Throat:      Mouth: Mucous membranes are moist.      Pharynx: Oropharynx is clear.   Eyes:      Extraocular Movements: Extraocular movements intact.      Conjunctiva/sclera: Conjunctivae normal.      Pupils: Pupils are equal, round, and reactive to light.   Cardiovascular:      Rate and Rhythm: Normal rate and regular rhythm.      Pulses: Normal pulses.      Heart sounds: Normal heart sounds.   Pulmonary:      Effort: Pulmonary effort is normal.      Breath sounds: Normal breath sounds.   Abdominal:      General: Abdomen is flat. Bowel sounds are normal.      Palpations: Abdomen is soft.   Musculoskeletal:         General: Normal range of motion.      Cervical back: Normal range of motion and neck supple.   Skin:     General: Skin is warm and dry.      Capillary Refill: Capillary refill takes 2 to 3 seconds.   Neurological:      General: No focal deficit present.      Mental Status: He is alert and oriented to person, place, and time. Mental status is at baseline.   Psychiatric:         Mood and Affect: Mood normal.         Behavior: Behavior normal.         Thought Content: Thought content normal.         Judgment: Judgment normal.         Last Recorded Vitals  Blood pressure 142/73, pulse 78, temperature 36.2 °C (97.2 °F), resp. rate 18, height 1.753 m (5' 9.02\"), weight 115 kg (254 lb 3.1 oz), SpO2 (!) 89%.  Intake/Output last 3 Shifts:  I/O last 3 completed shifts:  In: 580 (5 mL/kg) [P.O.:480; IV Piggyback:100]  Out: 84.5 (0.7 mL/kg) [Drains:84.5]  Weight: 115.3 kg " 5     Relevant Results                              Assessment & Plan  Umbilical hernia without obstruction and without gangrene    Ventral hernia without obstruction or gangrene    Recurrent umbilical hernia    7/11: doing well no issues pian controlled, walkiing well, no issues     Neuro:  -No acute concerns  -CAM/ICU delirium precuations     CV:  #Hemorrhagic shock, resolved  #TAVR (2023)  #Hx of HTN, HLD  PLAN:  -active type and screen  -ABDIEL drains unclamped  -holding home antihypertensives, losartan, spironolactone   -continue home lipitor  -maintain MAP greater than 65     Respiratory:  #COPD, not in exacerbation  #Post-op respiratory insufficiency  -on NC, wean as tolerated     GI:  #Ventral hernia repair  #GERD  -multimodal pain regimen per surgery  -Clear liquid diet  -bowel regimen  -protonix     Endo:  -no acute concerns  -POCT glucose q4h      Renal:  -No acute concerns  -trend BMP, mag and phos     Hematological:  #Postop blood loss anemia  #Myelodysplastic syndrome  -Trend CBC  -SCDs for DVT ppx     ID:  -no acute concerns    7/12: doing well no issues, overall will stop oxy and switch to norco home dose    7/13: dc today per surgery, medically stable for dc      I spent 35 minutes in the professional and overall care of this patient.      Mio Leal, DO

## 2025-07-13 NOTE — DISCHARGE SUMMARY
Discharge Diagnosis  Umbilical hernia without obstruction and without gangrene    Issues Requiring Follow-Up  Post-op open rectrorectus ventral hernia repair  Acute blood loss anemia in setting of known MDS  Ambulatory hypoxia    Hospital Course  65 year old male is now s/p elective TAR on 7/7 with Dr. Christopher.  Patient has a medical history significant for prostate CA, MDS, cerebral aneurysm, CAD, HTN, HLD, COPD/asthma (recent CT Chest with pulmonary fibrosis), and diverticulosis with recent diverticulitis. Surgical history significant for TAVR and h/o ventral hernias s/p umbilical hernia repair 6-7 years ago with mesh, c/b superficial infection.     Patient had two ABDIEL drains placed during procedure; RLQ drain over mesh and LLQ drain cut to half length in large hernia sac. The left drain was removed prior to discharge, but the patient will be discharged with the right drain in place.     Post-operative course complicated by bleeding primarily from RLQ mesh drain overnight on POD0, likely related to MDS and thrombocytopenia as operative field was dry during mesh placement. TXA x 2 and ICU transfer for hypotension. In total, patient received 2u pRBC, 1u FFP and 1u PLT. Hematology consulted. Lactemia resolved. And Hgb was stable for >48 hours prior to discharge. Transferred to telemetry floor from ICU on 07/09/2025. Pt had an ileus that resolved, but was noted to have multiple episodes of diarrhea, so medicine team ordered a C diff which was negative. He tolerated diet advancement and pain was well controlled on PO medication.     Of note, PT/OT worked with patient and noted acute drop in SpO2 during ambulation, but patient was asymptomatic and improved with rest. He should follow-up with his PCP to discuss if home O2, medication changes, or further testing is indicated.     Prescriptions for gabapentin sent at discharge. Pt should take his home norco as needed, as well as tylenol and ibuprofen. He will follow-up  with Dr. Christopher later this week for drain removal. He should also follow-up with Dr. Pierce for continued monitoring of his blood counts.     Visit Vitals  /72   Pulse 73   Temp 36.3 °C (97.3 °F)   Resp 18     Vitals:    07/09/25 1117   Weight: 115 kg (254 lb 3.1 oz)       Immunization History   Administered Date(s) Administered    COVID-19, mRNA, LNP-S, PF, 30 mcg/0.3 mL dose 03/26/2021, 04/16/2021, 12/28/2021    Flu vaccine (IIV4), preservative free *Check age/dose* 09/10/2018, 11/22/2019, 11/17/2023    Flu vaccine, quadrivalent, recombinant, preservative free, adult (FLUBLOK) 10/14/2022    Flu vaccine, trivalent, preservative free, HIGH-DOSE, age 65y+ (Fluzone) 12/03/2020, 10/29/2021, 09/24/2024    Flu vaccine, trivalent, preservative free, age 6 months and greater (Fluarix/Fluzone/Flulaval) 09/26/2016    Influenza, Unspecified 11/12/2012, 10/14/2013, 10/13/2014, 10/19/2015, 10/14/2022    Influenza, seasonal, injectable 11/12/2012, 10/14/2013, 10/13/2014, 10/19/2015, 09/26/2016    Pfizer COVID-19 vaccine, 12 years and older, (30mcg/0.3mL) (Comirnaty) 11/18/2023, 11/07/2024    Pfizer COVID-19 vaccine, bivalent, age 12 years and older (30 mcg/0.3 mL) 10/23/2022    Pneumococcal conjugate vaccine, 13-valent (PREVNAR 13) 11/22/2019    Pneumococcal conjugate vaccine, 20-valent (PREVNAR 20) 12/17/2024    Pneumococcal polysaccharide vaccine, 23-valent, age 2 years and older (PNEUMOVAX 23) 10/13/2014    Tdap vaccine, age 7 year and older (BOOSTRIX, ADACEL) 05/08/2017       Pertinent Physical Exam At Time of Discharge  Physical Exam  Constitutional:       General: He is not in acute distress.     Appearance: He is obese. He is not toxic-appearing.   HENT:      Mouth/Throat:      Mouth: Mucous membranes are moist.   Cardiovascular:      Rate and Rhythm: Normal rate and regular rhythm.      Heart sounds: Normal heart sounds.   Pulmonary:      Effort: Pulmonary effort is normal. No respiratory distress.      Breath  sounds: Normal breath sounds.   Abdominal:      General: Bowel sounds are normal. There is no distension.      Palpations: Abdomen is soft.      Tenderness: There is no abdominal tenderness.      Comments: ABDIEL x2 with serosanguinous drainage. Midline incision covered with dressing, c/d/i   Skin:     General: Skin is warm and dry.   Neurological:      Mental Status: He is alert and oriented to person, place, and time.   Psychiatric:         Mood and Affect: Mood normal.         Behavior: Behavior normal.         Home Medications     Medication List      START taking these medications     gabapentin 300 mg capsule; Commonly known as: Neurontin; Take 1 capsule   (300 mg) by mouth 3 times a day for 3 days.     CONTINUE taking these medications     amoxicillin 500 mg capsule; Commonly known as: Amoxil; Take 4 caps (2000   mg) 30-60mins prior to your dental appointment   aspirin 81 mg EC tablet   atorvastatin 20 mg tablet; Commonly known as: Lipitor; TAKE 1 TABLET   DAILY   ferrous sulfate 325 (65 Fe) mg EC tablet; Take 1 tablet (325 mg) by   mouth once daily. Ferrous sulfate 325 mg tablet, 1 tablet half an hour   after food daily.   fluticasone 50 mcg/actuation nasal spray; Commonly known as: Flonase;   Administer 2 sprays into each nostril once daily.   HYDROcodone-acetaminophen 5-325 mg tablet; Commonly known as: Norco;   Take 2 tablets by mouth 3 times a day as needed for severe pain (7 - 10).   linaCLOtide 145 mcg capsule; Commonly known as: Linzess; Take 1 capsule   (145 mcg) by mouth once daily in the morning. Take before meals. Do not   crush or chew.   losartan 100 mg tablet; Commonly known as: Cozaar; TAKE 1 TABLET DAILY   multivitamin capsule   multivitamin with minerals tablet   Narcan 4 mg/0.1 mL nasal spray; Generic drug: naloxone   pantoprazole 40 mg EC tablet; Commonly known as: ProtoNix; Take 1 tablet   (40 mg) by mouth once daily in the morning. Take before meals. Do not   crush, chew, or split.    spironolactone 25 mg tablet; Commonly known as: Aldactone; TAKE 1 TABLET   DAILY       Outpatient Follow-Up  Future Appointments   Date Time Provider Department Center   7/28/2025 10:00 AM Emmanuel Pierce MD [id number] [place]   8/18/2025  2:30 PM Christiano Baker PA-C [id number] [facility]   10/20/2025  9:45 AM Li Cloud DO [id number]   11/11/2025  8:00 AM Pal Mims MD [id number] [facility]       Sofie Mccrary PA-C

## 2025-07-14 LAB — HOLD SPECIMEN: NORMAL

## 2025-07-17 ENCOUNTER — OFFICE VISIT (OUTPATIENT)
Dept: SURGERY | Facility: CLINIC | Age: 65
End: 2025-07-17
Payer: COMMERCIAL

## 2025-07-17 VITALS
BODY MASS INDEX: 37.62 KG/M2 | HEART RATE: 73 BPM | WEIGHT: 254 LBS | DIASTOLIC BLOOD PRESSURE: 88 MMHG | SYSTOLIC BLOOD PRESSURE: 176 MMHG | RESPIRATION RATE: 17 BRPM | HEIGHT: 69 IN | OXYGEN SATURATION: 97 % | TEMPERATURE: 97.2 F

## 2025-07-17 DIAGNOSIS — E11.9 TYPE 2 DIABETES MELLITUS WITHOUT COMPLICATION, WITHOUT LONG-TERM CURRENT USE OF INSULIN: ICD-10-CM

## 2025-07-17 DIAGNOSIS — E66.813 CLASS 3 SEVERE OBESITY WITH BODY MASS INDEX (BMI) OF 40.0 TO 44.9 IN ADULT: ICD-10-CM

## 2025-07-17 DIAGNOSIS — K42.9 UMBILICAL HERNIA WITHOUT OBSTRUCTION AND WITHOUT GANGRENE: ICD-10-CM

## 2025-07-17 DIAGNOSIS — D69.6 THROMBOCYTOPENIA: Primary | ICD-10-CM

## 2025-07-17 PROCEDURE — 1111F DSCHRG MED/CURRENT MED MERGE: CPT | Performed by: SURGERY

## 2025-07-17 PROCEDURE — 99024 POSTOP FOLLOW-UP VISIT: CPT | Performed by: SURGERY

## 2025-07-17 PROCEDURE — 3079F DIAST BP 80-89 MM HG: CPT | Performed by: SURGERY

## 2025-07-17 PROCEDURE — 4010F ACE/ARB THERAPY RXD/TAKEN: CPT | Performed by: SURGERY

## 2025-07-17 PROCEDURE — 1159F MED LIST DOCD IN RCRD: CPT | Performed by: SURGERY

## 2025-07-17 PROCEDURE — 3077F SYST BP >= 140 MM HG: CPT | Performed by: SURGERY

## 2025-07-17 PROCEDURE — 3008F BODY MASS INDEX DOCD: CPT | Performed by: SURGERY

## 2025-07-17 ASSESSMENT — ENCOUNTER SYMPTOMS
CONFUSION: 0
HEADACHES: 0
DIFFICULTY URINATING: 0
CHILLS: 0
JOINT SWELLING: 0
SHORTNESS OF BREATH: 0
ABDOMINAL PAIN: 0
SLEEP DISTURBANCE: 0
VOMITING: 0
COUGH: 0
WHEEZING: 0
ARTHRALGIAS: 0
UNEXPECTED WEIGHT CHANGE: 0
TROUBLE SWALLOWING: 0
ABDOMINAL DISTENTION: 1
FEVER: 0
COLOR CHANGE: 0
CONSTIPATION: 0
DIARRHEA: 0
SPEECH DIFFICULTY: 0
DIZZINESS: 0
LIGHT-HEADEDNESS: 0
NAUSEA: 0
BLOOD IN STOOL: 0

## 2025-07-17 NOTE — PROGRESS NOTES
"Subjective   Patient ID: Jose Gaviria \"Crow\" is a 65 y.o. male who presents for No chief complaint on file..  HPI  63 YO M BMI 39 prostate cancer, MDS, cerebral aneurysm, CAD,  HTN, HLD, COPD/asthma, fluid retention, diverticulosis, referred for ventral hernias. Prior TAVR. Patient has also been having issues with constipation following initiation of iron supplements and pain medications, seeing GI. Possible small bowel bleed / anemia?      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 He did this work in his twenties and forties.     He experiences significant stomach pain, described as a 'bad stomach ache' that comes and goes. He seems to indicate it is really bad and worsening. The pain is sometimes located in the center of his abdomen and can fluctuate between the upper and lower abdomen, occasionally near a previous hernia site which was repaired remotely with superficial infection. The pain is alleviated when he lies down. No nausea or vomiting is associated with the pain, and his appetite remains unaffected. Underwent CT scan with PCP which demonstrated multiple abdominal hernias, possible sigmoid diverticulitis (was given augmentin). Denies fevers, chills, nausea/vomiting, having occasional LLQ pain. Underwent EGD/colonoscopy 03/12/25. C0M1 Schroeder's esophagus; electronic chromoendoscopy (NBI) was used; performed targeted cold forceps biopsies. Grade A esophagitis.One 10 mm polyp; lift performed; removed by EMR. 6 polyps were removed with cold snare. Diverticulosis of mild severity in the sigmoid colon.    CT ABD PEL 04/29/2025. Hernia width 7.5 cm and 4.5 cm. 11 cm total length. Incarcerated fat. Weakness around umbilicus too.    Manageable chronic pain, using hydrocodone-acetaminophen 5-325 mg orally 3 times daily as needed. Will make pain control tough after surgery.    Patient underwent open upper abdomen retrorectus repair 07/07/2025. " Skin incision made from right of umbilicus to 15 cm superior of umbilicus. 15+ cm supraumbilical sac on 4 cm defect with incarcerated omentum and mesentery of adjacent small bowel. 6 cm defect width in epigastrium with smaller sac. Smaller periumbilical hernia at left side. Prior mesh repair, appeared to be preperitoneal under umbilicus. Partially removed for flap mobilization.  12 cm total defects length. 20 x 20 cm Synecore mesh placed in retrorectus space.  Fascia closed off tension. RLQ drain over mesh. LLQ drain cut to half length in subcutaneous tissue.     Patient had two ABDIEL drains placed during procedure; RLQ drain over mesh and LLQ drain cut to half length in large hernia sac. The left drain was removed prior to discharge, but the patient will be discharged with the right drain in place.      Post-operative course complicated by bleeding primarily from RLQ mesh drain overnight on POD0, likely related to MDS and thrombocytopenia as operative field was dry during mesh placement. TXA x 2 and ICU transfer for hypotension. In total, patient received 2u pRBC, 1u FFP and 1u PLT. Hematology consulted. Lactemia resolved. And Hgb was stable for >48 hours prior to discharge. Transferred to telemetry floor from ICU on 07/09/2025. Pt had an ileus that resolved, but was noted to have multiple episodes of diarrhea, so medicine team ordered a C diff which was negative. He tolerated diet advancement and pain was well controlled on PO medication.      Of note, PT/OT worked with patient and noted acute drop in SpO2 during ambulation, but patient was asymptomatic and improved with rest. He should follow-up with his PCP to discuss if home O2, medication changes, or further testing is indicated. Prescriptions for gabapentin sent at discharge. Pt should take his home norco as needed, as well as tylenol and ibuprofen. He will follow-up with Dr. Christopher later this week for drain removal. He should also follow-up with Dr. Pierce for  continued monitoring of his blood counts.     Returned to clinic 07/17/2025. No issues. Pain that prompted surgery is gone. Some firmness in midline, likely hematoma. Minimal bloody drainage from right drain. 35-40 ml a day.    Review of Systems   Constitutional:  Negative for chills, fever and unexpected weight change.   HENT:  Negative for congestion and trouble swallowing.    Respiratory:  Negative for cough, shortness of breath and wheezing.    Cardiovascular:  Negative for chest pain.   Gastrointestinal:  Positive for abdominal distention. Negative for abdominal pain, blood in stool, constipation, diarrhea, nausea and vomiting.   Genitourinary:  Negative for difficulty urinating.   Musculoskeletal:  Negative for arthralgias and joint swelling.   Skin:  Negative for color change.   Neurological:  Negative for dizziness, speech difficulty, light-headedness and headaches.   Psychiatric/Behavioral:  Negative for confusion and sleep disturbance.        Objective   Physical Exam  Constitutional:       General: He is awake.      Appearance: Normal appearance.   HENT:      Head: Normocephalic and atraumatic.      Nose: Nose normal.      Mouth/Throat:      Mouth: Mucous membranes are moist.     Eyes:      Pupils: Pupils are equal, round, and reactive to light.     Neck:      Thyroid: No thyroid mass.      Trachea: Phonation normal.     Cardiovascular:      Rate and Rhythm: Normal rate and regular rhythm.   Pulmonary:      Effort: Pulmonary effort is normal. No respiratory distress.      Breath sounds: Normal air entry. No decreased breath sounds, wheezing, rhonchi or rales.   Abdominal:      General: Bowel sounds are normal. There is no distension.      Palpations: Abdomen is soft.      Tenderness: There is no abdominal tenderness.      Comments: Some firmness in midline, likely hematoma. Incision CDI. Minimal bloody drainage from right drain. 35-40 ml a day.      Musculoskeletal:      Cervical back: Neck supple.       Right lower leg: No edema.      Left lower leg: No edema.     Skin:     General: Skin is warm.      Capillary Refill: Capillary refill takes less than 2 seconds.     Neurological:      General: No focal deficit present.      Mental Status: He is alert and oriented to person, place, and time. Mental status is at baseline.      Cranial Nerves: Cranial nerves 2-12 are intact.      Motor: Motor function is intact.     Psychiatric:         Attention and Perception: Attention and perception normal.         Mood and Affect: Mood normal.         Speech: Speech normal.         Behavior: Behavior normal.         Assessment/Plan   Problem List Items Addressed This Visit           ICD-10-CM       Coag and Thromboembolic    Thrombocytopenia - Primary D69.6    Relevant Orders    CBC    Basic metabolic panel       Endocrine/Metabolic    Type 2 diabetes mellitus without complications E11.9    Class 3 severe obesity with body mass index (BMI) of 40.0 to 44.9 in adult E66.813, Z68.41       Gastrointestinal and Abdominal    Hernia, umbilical K42.9     Continue drain on suction. Empty and record daily. Return on Monday for drain removal.  Continue abdominal binder when out of bed.  Can shower. Replace drain sponge after. Keep dry.  Appt on Monday set. Will get labs on that day as baseline.         Tin Christopher MD PhD 07/17/25 10:14 AM

## 2025-07-21 ENCOUNTER — APPOINTMENT (OUTPATIENT)
Dept: SURGERY | Facility: CLINIC | Age: 65
End: 2025-07-21
Payer: COMMERCIAL

## 2025-07-21 VITALS
BODY MASS INDEX: 36.73 KG/M2 | HEIGHT: 69 IN | HEART RATE: 83 BPM | SYSTOLIC BLOOD PRESSURE: 126 MMHG | WEIGHT: 248 LBS | TEMPERATURE: 97.7 F | DIASTOLIC BLOOD PRESSURE: 78 MMHG | OXYGEN SATURATION: 95 % | RESPIRATION RATE: 16 BRPM

## 2025-07-21 DIAGNOSIS — J43.2 CENTRILOBULAR EMPHYSEMA (MULTI): ICD-10-CM

## 2025-07-21 DIAGNOSIS — I50.33 ACUTE ON CHRONIC HEART FAILURE WITH PRESERVED EJECTION FRACTION: Primary | ICD-10-CM

## 2025-07-21 DIAGNOSIS — T14.8XXA HEMATOMA: ICD-10-CM

## 2025-07-21 DIAGNOSIS — E11.9 TYPE 2 DIABETES MELLITUS WITHOUT COMPLICATION, WITHOUT LONG-TERM CURRENT USE OF INSULIN: ICD-10-CM

## 2025-07-21 DIAGNOSIS — K42.9 RECURRENT UMBILICAL HERNIA: Primary | ICD-10-CM

## 2025-07-21 PROCEDURE — 3008F BODY MASS INDEX DOCD: CPT | Performed by: SURGERY

## 2025-07-21 PROCEDURE — 3074F SYST BP LT 130 MM HG: CPT | Performed by: SURGERY

## 2025-07-21 PROCEDURE — 99213 OFFICE O/P EST LOW 20 MIN: CPT | Performed by: SURGERY

## 2025-07-21 PROCEDURE — 4010F ACE/ARB THERAPY RXD/TAKEN: CPT | Performed by: SURGERY

## 2025-07-21 PROCEDURE — 1111F DSCHRG MED/CURRENT MED MERGE: CPT | Performed by: SURGERY

## 2025-07-21 PROCEDURE — 3078F DIAST BP <80 MM HG: CPT | Performed by: SURGERY

## 2025-07-21 PROCEDURE — 1125F AMNT PAIN NOTED PAIN PRSNT: CPT | Performed by: SURGERY

## 2025-07-21 PROCEDURE — 1159F MED LIST DOCD IN RCRD: CPT | Performed by: SURGERY

## 2025-07-21 RX ORDER — CEPHALEXIN 500 MG/1
500 CAPSULE ORAL 2 TIMES DAILY
Qty: 28 CAPSULE | Refills: 0 | Status: SHIPPED | OUTPATIENT
Start: 2025-07-21 | End: 2025-08-04

## 2025-07-21 ASSESSMENT — ENCOUNTER SYMPTOMS
SLEEP DISTURBANCE: 0
HEADACHES: 0
NAUSEA: 0
CONFUSION: 0
VOMITING: 0
BLOOD IN STOOL: 0
WHEEZING: 0
SPEECH DIFFICULTY: 0
COLOR CHANGE: 0
SHORTNESS OF BREATH: 0
COUGH: 0
CHILLS: 0
TROUBLE SWALLOWING: 0
LIGHT-HEADEDNESS: 0
DIARRHEA: 0
UNEXPECTED WEIGHT CHANGE: 0
ARTHRALGIAS: 0
DIZZINESS: 0
DIFFICULTY URINATING: 0
CONSTIPATION: 0
ABDOMINAL DISTENTION: 0
JOINT SWELLING: 0
ABDOMINAL PAIN: 0
FEVER: 0

## 2025-07-21 ASSESSMENT — PAIN SCALES - GENERAL: PAINLEVEL_OUTOF10: 4

## 2025-07-21 NOTE — PROGRESS NOTES
"Subjective   Patient ID: Jose Gaviria \"Crow\" is a 65 y.o. male who presents for Post-op (Drain removal).  HPI  65 YO M BMI 39 prostate cancer, MDS, cerebral aneurysm, CAD,  HTN, HLD, COPD/asthma, fluid retention, diverticulosis, referred for ventral hernias. Prior TAVR. Patient has also been having issues with constipation following initiation of iron supplements and pain medications, seeing GI. Possible small bowel bleed / anemia?      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 He did this work in his twenties and forties.     He experiences significant stomach pain, described as a 'bad stomach ache' that comes and goes. He seems to indicate it is really bad and worsening. The pain is sometimes located in the center of his abdomen and can fluctuate between the upper and lower abdomen, occasionally near a previous hernia site which was repaired remotely with superficial infection. The pain is alleviated when he lies down. No nausea or vomiting is associated with the pain, and his appetite remains unaffected. Underwent CT scan with PCP which demonstrated multiple abdominal hernias, possible sigmoid diverticulitis (was given augmentin). Denies fevers, chills, nausea/vomiting, having occasional LLQ pain. Underwent EGD/colonoscopy 03/12/25. C0M1 Schroeder's esophagus; electronic chromoendoscopy (NBI) was used; performed targeted cold forceps biopsies. Grade A esophagitis.One 10 mm polyp; lift performed; removed by EMR. 6 polyps were removed with cold snare. Diverticulosis of mild severity in the sigmoid colon.    CT ABD PEL 04/29/2025. Hernia width 7.5 cm and 4.5 cm. 11 cm total length. Incarcerated fat. Weakness around umbilicus too.    Manageable chronic pain, using hydrocodone-acetaminophen 5-325 mg orally 3 times daily as needed. Will make pain control tough after surgery.    Patient underwent open upper abdomen retrorectus repair 07/07/2025. Skin " incision made from right of umbilicus to 15 cm superior of umbilicus. 15+ cm supraumbilical sac on 4 cm defect with incarcerated omentum and mesentery of adjacent small bowel. 6 cm defect width in epigastrium with smaller sac. Smaller periumbilical hernia at left side. Prior mesh repair, appeared to be preperitoneal under umbilicus. Partially removed for flap mobilization.  12 cm total defects length. 20 x 20 cm Synecore mesh placed in retrorectus space.  Fascia closed off tension. RLQ drain over mesh. LLQ drain cut to half length in subcutaneous tissue.     Patient had two ABDIEL drains placed during procedure; RLQ drain over mesh and LLQ drain cut to half length in large hernia sac. The left drain was removed prior to discharge, but the patient will be discharged with the right drain in place.      Post-operative course complicated by bleeding primarily from RLQ mesh drain overnight on POD0, likely related to MDS and thrombocytopenia as operative field was dry during mesh placement. TXA x 2 and ICU transfer for hypotension. In total, patient received 2u pRBC, 1u FFP and 1u PLT. Hematology consulted. Lactemia resolved. And Hgb was stable for >48 hours prior to discharge. Transferred to telemetry floor from ICU on 07/09/2025. Pt had an ileus that resolved, but was noted to have multiple episodes of diarrhea, so medicine team ordered a C diff which was negative. He tolerated diet advancement and pain was well controlled on PO medication.      Of note, PT/OT worked with patient and noted acute drop in SpO2 during ambulation, but patient was asymptomatic and improved with rest. He should follow-up with his PCP to discuss if home O2, medication changes, or further testing is indicated. Prescriptions for gabapentin sent at discharge. Pt should take his home norco as needed, as well as tylenol and ibuprofen. He will follow-up with Dr. Christopher later this week for drain removal. He should also follow-up with Dr. Pierce for  continued monitoring of his blood counts.     Returned to clinic 07/17/2025. No issues. Pain that prompted surgery is gone. Some firmness in midline, likely hematoma. Minimal bloody drainage from right drain. 35-40 ml a day.    Returned to clinic 07/21/2025. Drain output serous and less than 10 last three days. Removed. Keflex prescribed due to history of mesh infection in prior repairs and hematoma. No signs of infection presently.    Review of Systems   Constitutional:  Negative for chills, fever and unexpected weight change.   HENT:  Negative for congestion and trouble swallowing.    Respiratory:  Negative for cough, shortness of breath and wheezing.    Cardiovascular:  Negative for chest pain.   Gastrointestinal:  Negative for abdominal distention, abdominal pain, blood in stool, constipation, diarrhea, nausea and vomiting.   Genitourinary:  Negative for difficulty urinating.   Musculoskeletal:  Negative for arthralgias and joint swelling.   Skin:  Negative for color change.   Neurological:  Negative for dizziness, speech difficulty, light-headedness and headaches.   Psychiatric/Behavioral:  Negative for confusion and sleep disturbance.        Objective   Physical Exam  Constitutional:       General: He is awake.      Appearance: Normal appearance.   HENT:      Head: Normocephalic and atraumatic.      Nose: Nose normal.      Mouth/Throat:      Mouth: Mucous membranes are moist.     Eyes:      Pupils: Pupils are equal, round, and reactive to light.     Neck:      Thyroid: No thyroid mass.      Trachea: Phonation normal.     Cardiovascular:      Rate and Rhythm: Normal rate and regular rhythm.   Pulmonary:      Effort: Pulmonary effort is normal. No respiratory distress.      Breath sounds: Normal air entry. No decreased breath sounds, wheezing, rhonchi or rales.   Abdominal:      General: Bowel sounds are normal. There is no distension.      Palpations: Abdomen is soft.      Tenderness: There is no abdominal  tenderness.      Comments: Soft abdomen. Incision CDI. Minimal serous drainage from right drain. <10 ml a day.      Musculoskeletal:      Cervical back: Neck supple.      Right lower leg: No edema.      Left lower leg: No edema.     Skin:     General: Skin is warm.      Capillary Refill: Capillary refill takes less than 2 seconds.     Neurological:      General: No focal deficit present.      Mental Status: He is alert and oriented to person, place, and time. Mental status is at baseline.      Cranial Nerves: Cranial nerves 2-12 are intact.      Motor: Motor function is intact.     Psychiatric:         Attention and Perception: Attention and perception normal.         Mood and Affect: Mood normal.         Speech: Speech normal.         Behavior: Behavior normal.         Assessment/Plan   Problem List Items Addressed This Visit    None  Visit Diagnoses         Codes      Recurrent umbilical hernia    -  Primary K42.9    Relevant Medications    cephalexin (Keflex) 500 mg capsule      Hematoma     T14.8XXA    Relevant Medications    cephalexin (Keflex) 500 mg capsule          Continue abdominal binder when out of bed.  Can shower. Replace drain sponge after. Keep dry.  Return in three weeks.  Prophylactic keflex for small residual hematoma on mesh.         Tin Chrsitopher MD PhD 07/21/25 9:53 AM    none

## 2025-07-24 DIAGNOSIS — G89.4 CHRONIC PAIN SYNDROME: ICD-10-CM

## 2025-07-24 DIAGNOSIS — M54.50 CHRONIC BILATERAL LOW BACK PAIN WITHOUT SCIATICA: Chronic | ICD-10-CM

## 2025-07-24 DIAGNOSIS — G89.29 CHRONIC BILATERAL LOW BACK PAIN WITHOUT SCIATICA: Chronic | ICD-10-CM

## 2025-07-24 RX ORDER — HYDROCODONE BITARTRATE AND ACETAMINOPHEN 5; 325 MG/1; MG/1
2 TABLET ORAL 3 TIMES DAILY PRN
Qty: 180 TABLET | Refills: 0 | Status: SHIPPED | OUTPATIENT
Start: 2025-07-24 | End: 2025-08-23

## 2025-07-28 ENCOUNTER — TELEPHONE (OUTPATIENT)
Dept: SURGERY | Facility: CLINIC | Age: 65
End: 2025-07-28
Payer: COMMERCIAL

## 2025-07-28 ENCOUNTER — APPOINTMENT (OUTPATIENT)
Dept: HEMATOLOGY/ONCOLOGY | Facility: CLINIC | Age: 65
End: 2025-07-28
Payer: COMMERCIAL

## 2025-07-28 NOTE — TELEPHONE ENCOUNTER
"Patient and patient's wife called back and stated patient is \"feeling a whole lot better\".  No skin changes, nausea, fever, pain.  He will keep his appt for 8/5/25 and call the office back/get the CT scan done if having any of the previous symptoms.     Per Dr. Christopher, patient can hold off on CT scan If he's feeling well - no pain, fever, nausea, chills, skin changes - then he can just see me in office Tuesday.   "

## 2025-07-30 PROBLEM — I10 ESSENTIAL HYPERTENSION: Status: RESOLVED | Noted: 2018-08-03 | Resolved: 2025-07-30

## 2025-07-30 PROBLEM — R73.9 ACUTE HYPERGLYCEMIA: Status: RESOLVED | Noted: 2023-05-19 | Resolved: 2025-07-30

## 2025-08-01 ENCOUNTER — LAB (OUTPATIENT)
Dept: LAB | Facility: CLINIC | Age: 65
End: 2025-08-01
Payer: COMMERCIAL

## 2025-08-01 DIAGNOSIS — D61.818 PANCYTOPENIA: ICD-10-CM

## 2025-08-01 DIAGNOSIS — D50.9 IRON DEFICIENCY ANEMIA, UNSPECIFIED IRON DEFICIENCY ANEMIA TYPE: ICD-10-CM

## 2025-08-01 LAB
ACANTHOCYTES BLD QL SMEAR: ABNORMAL
ALBUMIN SERPL BCP-MCNC: 4 G/DL (ref 3.4–5)
ALP SERPL-CCNC: 90 U/L (ref 33–136)
ALT SERPL W P-5'-P-CCNC: 18 U/L (ref 10–52)
ANION GAP SERPL CALC-SCNC: 9 MMOL/L (ref 10–20)
AST SERPL W P-5'-P-CCNC: 23 U/L (ref 9–39)
BASOPHILS # BLD MANUAL: 0.03 X10*3/UL (ref 0–0.1)
BASOPHILS NFR BLD MANUAL: 1 %
BILIRUB SERPL-MCNC: 0.8 MG/DL (ref 0–1.2)
BUN SERPL-MCNC: 22 MG/DL (ref 6–23)
CALCIUM SERPL-MCNC: 8.9 MG/DL (ref 8.6–10.3)
CHLORIDE SERPL-SCNC: 107 MMOL/L (ref 98–107)
CO2 SERPL-SCNC: 28 MMOL/L (ref 21–32)
CREAT SERPL-MCNC: 0.94 MG/DL (ref 0.5–1.3)
DACRYOCYTES BLD QL SMEAR: ABNORMAL
EGFRCR SERPLBLD CKD-EPI 2021: 89 ML/MIN/1.73M*2
EOSINOPHIL # BLD MANUAL: 0.13 X10*3/UL (ref 0–0.7)
EOSINOPHIL NFR BLD MANUAL: 5 %
ERYTHROCYTE [DISTWIDTH] IN BLOOD BY AUTOMATED COUNT: 20.2 % (ref 11.5–14.5)
FERRITIN SERPL-MCNC: 351 NG/ML (ref 20–300)
GLUCOSE SERPL-MCNC: 109 MG/DL (ref 74–99)
HCT VFR BLD AUTO: 38.6 % (ref 41–52)
HGB BLD-MCNC: 12.4 G/DL (ref 13.5–17.5)
IMM GRANULOCYTES # BLD AUTO: 0.05 X10*3/UL (ref 0–0.7)
IMM GRANULOCYTES NFR BLD AUTO: 2 % (ref 0–0.9)
IRON SATN MFR SERPL: 28 % (ref 25–45)
IRON SERPL-MCNC: 98 UG/DL (ref 35–150)
LYMPHOCYTES # BLD MANUAL: 1.63 X10*3/UL (ref 1.2–4.8)
LYMPHOCYTES NFR BLD MANUAL: 65 %
MCH RBC QN AUTO: 36 PG (ref 26–34)
MCHC RBC AUTO-ENTMCNC: 32.1 G/DL (ref 32–36)
MCV RBC AUTO: 112 FL (ref 80–100)
METAMYELOCYTES # BLD MANUAL: 0.03 X10*3/UL
METAMYELOCYTES NFR BLD MANUAL: 1 %
MONOCYTES # BLD MANUAL: 0.28 X10*3/UL (ref 0.1–1)
MONOCYTES NFR BLD MANUAL: 11 %
NEUTROPHILS # BLD MANUAL: 0.38 X10*3/UL (ref 1.2–7.7)
NEUTS BAND # BLD MANUAL: 0.13 X10*3/UL (ref 0–0.7)
NEUTS BAND NFR BLD MANUAL: 5 %
NEUTS SEG # BLD MANUAL: 0.25 X10*3/UL (ref 1.2–7)
NEUTS SEG NFR BLD MANUAL: 10 %
NEUTS VAC BLD QL SMEAR: PRESENT
NRBC BLD-RTO: 0 /100 WBCS (ref 0–0)
OVALOCYTES BLD QL SMEAR: ABNORMAL
PLATELET # BLD AUTO: 132 X10*3/UL (ref 150–450)
POLYCHROMASIA BLD QL SMEAR: ABNORMAL
POTASSIUM SERPL-SCNC: 3.9 MMOL/L (ref 3.5–5.3)
PROT SERPL-MCNC: 7.6 G/DL (ref 6.4–8.2)
RBC # BLD AUTO: 3.44 X10*6/UL (ref 4.5–5.9)
RBC MORPH BLD: ABNORMAL
SCHISTOCYTES BLD QL SMEAR: ABNORMAL
SODIUM SERPL-SCNC: 140 MMOL/L (ref 136–145)
TIBC SERPL-MCNC: 354 UG/DL (ref 240–445)
TOTAL CELLS COUNTED BLD: 100
UIBC SERPL-MCNC: 256 UG/DL (ref 110–370)
VARIANT LYMPHS # BLD MANUAL: 0.05 X10*3/UL (ref 0–0.5)
VARIANT LYMPHS NFR BLD: 2 %
WBC # BLD AUTO: 2.5 X10*3/UL (ref 4.4–11.3)

## 2025-08-01 PROCEDURE — 84075 ASSAY ALKALINE PHOSPHATASE: CPT

## 2025-08-01 PROCEDURE — 85027 COMPLETE CBC AUTOMATED: CPT

## 2025-08-01 PROCEDURE — 36415 COLL VENOUS BLD VENIPUNCTURE: CPT

## 2025-08-01 PROCEDURE — 85007 BL SMEAR W/DIFF WBC COUNT: CPT

## 2025-08-01 PROCEDURE — 83540 ASSAY OF IRON: CPT

## 2025-08-01 PROCEDURE — 82728 ASSAY OF FERRITIN: CPT | Mod: PARLAB

## 2025-08-04 ENCOUNTER — OFFICE VISIT (OUTPATIENT)
Dept: HEMATOLOGY/ONCOLOGY | Facility: CLINIC | Age: 65
End: 2025-08-04
Payer: COMMERCIAL

## 2025-08-04 VITALS
OXYGEN SATURATION: 94 % | RESPIRATION RATE: 18 BRPM | TEMPERATURE: 97 F | DIASTOLIC BLOOD PRESSURE: 63 MMHG | WEIGHT: 247.14 LBS | HEART RATE: 67 BPM | SYSTOLIC BLOOD PRESSURE: 143 MMHG | BODY MASS INDEX: 36.5 KG/M2

## 2025-08-04 DIAGNOSIS — D50.9 IRON DEFICIENCY ANEMIA, UNSPECIFIED IRON DEFICIENCY ANEMIA TYPE: ICD-10-CM

## 2025-08-04 DIAGNOSIS — D61.818 PANCYTOPENIA: ICD-10-CM

## 2025-08-04 PROCEDURE — 3077F SYST BP >= 140 MM HG: CPT | Performed by: INTERNAL MEDICINE

## 2025-08-04 PROCEDURE — 4010F ACE/ARB THERAPY RXD/TAKEN: CPT | Performed by: INTERNAL MEDICINE

## 2025-08-04 PROCEDURE — 1111F DSCHRG MED/CURRENT MED MERGE: CPT | Performed by: INTERNAL MEDICINE

## 2025-08-04 PROCEDURE — 1159F MED LIST DOCD IN RCRD: CPT | Performed by: INTERNAL MEDICINE

## 2025-08-04 PROCEDURE — 3078F DIAST BP <80 MM HG: CPT | Performed by: INTERNAL MEDICINE

## 2025-08-04 PROCEDURE — 99214 OFFICE O/P EST MOD 30 MIN: CPT | Performed by: INTERNAL MEDICINE

## 2025-08-04 PROCEDURE — 1126F AMNT PAIN NOTED NONE PRSNT: CPT | Performed by: INTERNAL MEDICINE

## 2025-08-04 ASSESSMENT — PAIN SCALES - GENERAL: PAINLEVEL_OUTOF10: 0-NO PAIN

## 2025-08-04 NOTE — PROGRESS NOTES
"Treatment Synopsis:    myelodysplastic syndrome - unclassifiableb:   Diagnosis:   Presented to Dr. Ovalle for pogressive, mild thrombocytopenia.  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     Treatment:   None to date         History of Present Illness:      ID Statement:    DIPIKA JOHNSON is a 63 year old Male        Chief Complaint: \"I am here for my MDS\"   Interval History:    The patient was referred to me by Dr. Garrison for further evaluation and management of anemia with myelodysplastic syndrome on 08/04/2021.      The patient is a 61 year old with a past medical history significant for prostate cancer, cerebral aneurysm, CAD,  HTN, HLD, COPD/asthma, fluid retention, diverticulosis, umbilical hernia, arthritis and low back pain. The patient has a history of MDS  and has been diagnosed in the past. He has been monitoring his CBC with his primary care physician, and a recent change in his WBC and slight anemia has prompted a re-referral to hematology - where we completed a repeat bone marrow biopsy that continued  to show low grade disease, with the diagnosis of myelodysplastic syndrome due to history of Damian t(1;7) leading to equivalent loss of 7q.  The patient was being treated at [facility] but has moved to the area and is choosing to be treated here at [place].      At interview on 08/04/2021 the patient narrated his past medical history. The patient is asymptomatic and denies  any history of shortness of breath at rest, nausea, vomiting, hematemesis, hematuria.     The patient had come for a follow up on 5/1/2023 regarding his history of multifactorial anemia 7Q-patient underwent aortic valve replacement in March 2023 and received blood transfusions.  " Initially felt better but is now beginning to feel weak and tired.   The patient reports easy fatigability. She is anxious to review the result of the tests performed.      The patient and his wife had come for follow-up on June 5, 2023 regarding history of multifactorial anemia, myelodysplastic syndrome with 7 q. minus.  The patient underwent aortic valve replacement in March 2023 and received blood transfusions.  Initially  felt better but is now beginning to feel weak and tired requesting assistance.     The patient and his wife had come for follow-up on January 29, 2024 regarding history of multifactorial anemia, myelodysplastic syndrome with 7 q. - and iron deficiency component.  The patient underwent aortic valve replacement in March 2023 and received  blood transfusion.  Also had iron deficiency component and received intravenous Feraheme as well.  He is beginning to feel better now.    The patient and his wife had come for follow-up on January 27,2025 regarding history of multifactorial anemia including iron deficiency component myelodysplastic syndrome 7 q. minus.  The patient was placed on oral iron replacement therapy after surgery for aortic valve placement.  Approximately 6 months ago the patient's hemoglobin was 9 g/dL.  His hemoglobin has slowly improved up to 13.4 g/dL in July2024.  Oral iron replacement was held.  The patient is asymptomatic.    The patient and his wife had come for follow-up on August 4,2025 regarding history of multifactorial anemia including iron deficiency component myelodysplastic syndrome 7 q. minus.  A colonoscopy on March 12, 2025 revealed multiple benign colonic polyps.  Underwent hernia repair surgery on July 7, 2025.  Did have postop bleed requiring 2 units of packed red blood cell transfusion, 1 unit of FFP and 1 unit of platelets.  The patient is currently receiving oral iron replacement therapy.  Feeling better.     Past Medical History:   1. Anemia with MDS  2.  Prostate cancer  3. Cerebral aneurysm  4. CAD   5. HTN   6. HLD  7. COPD/asthma  8. Fluid retention   9. Diverticulosis   10. Umbilical hernia   11. Arthritis   12. Low back pain      Past Surgical History:   1. Cerebral aneurysm repair   2. Stent placement   3. Brachytherapy   4.  Aortic valve replacement in March 2023.  The patient received 2 units of packed red blood cell transfusions.  5.  Abdominal hernia repair on July 7, 2025.  Bleed requiring 2 units of packed red blood cell transfusion, 1 unit of FFP and 1 unit of platelets.    Family Medical History:   1. Reviewed but not relevant.      Last colonoscopy 3/12/25, multiple  polyps                             Review of Systems:   ·  System Review All other systems have been reviewed and are negative for complaint.            Allergies and Intolerances:       Intolerances:         ciprofloxacin: Drug, Unknown, Active     Outpatient Medication Profile:  * Patient Currently Takes Medications as of 31-Jul-2023 10:49 documented in Structured Notes         losartan 100 mg oral tablet : Last Dose Taken:  , 1 tab(s) orally once a day         atorvastatin 20 mg oral tablet: Last Dose Taken:  , 1 tab(s) orally once  a day         oxyCODONE-acetaminophen 7.5 mg-325 mg oral tablet: Last Dose Taken:   , 1 tab(s) orally 2 times a day, As Needed         albuterol 2.5 mg/3 mL (0.083%) inhalation solution: Last Dose Taken:   , 3 milliliter(s) inhaled every 4-6 hours, As Needed         albuterol 90 mcg/inh inhalation aerosol: Last Dose Taken:  , 2 puff(s)  inhaled every 4-6 hours, As Needed         spironolactone 25 mg oral tablet: Last Dose Taken:  , 1 tab(s) orally  once a day         aspirin 81 mg oral delayed release tablet: Last Dose Taken:  , 1 tab(s)  orally once a day         Multiple Vitamins with Minerals oral tablet: Last Dose Taken:  , 1 tab(s)  orally once a day         fluticasone/umeclidinium/vilanterol 100 mcg-62.5 mcg-25 mcg/inh inhalation powder : Last Dose  Taken:  , 1 puff(s) inhaled once a day         torsemide 20 mg oral tablet: Last Dose Taken:  , 1 tab(s) orally once  a day         Trelegy Ellipta 200 mcg-62.5 mcg-25 mcg/inh inhalation powder: Last Dose  Taken:  , 1 puff(s) inhaled once a day             Medical History:         Severe calcific aortic valve stenosis: ICD-10: I35.0, Status:  Active         Status post right and left heart catheterization: ICD-10:  Z98.890, Status: Active         Bacteremia: ICD-10: R78.81, Status: Active         MDS (myelodysplastic syndrome): ICD-10: D46.9, Status: Active         History of cerebral aneurysm repair: ICD-10: Z98.890, Status:  Active, Description: coiling for thrombosed basilar tip aneurysm  9/12/17         Personal history of prostate cancer: ICD-10: Z85.46, Status:  Active, Description: 2014         Chronic low back pain: ICD-10: M54.5, Status: Active         Arthritis: ICD-10: M19.90, Status: Active, Description: spine         Umbilical hernia without obstruction or gangrene: ICD-10: K42.9,  Status: Active         History of diverticulosis: ICD-10: Z87.19, Status: Active,  Description: per CT         Myelodysplasia: ICD-10: Q06.9, Status: Active       Surg History:         S/P TAVR (transcatheter aortic valve replacement): ICD-10:  Z95.2, Status: Active         Dyspnea: ICD-10: R06.00, Status: Active         Coronary artery disease: ICD-10: I25.10, Status: Active         Elevated coronary artery calcium score: ICD-10: R93.1, Status:  Active         History of surgery for cerebral aneurysm: ICD-10: Z98.890,  Status: Active, Description: coiling for thrombosed basilar tip aneurysm  9/12/17         History of brachytherapy: ICD-10: Z92.3, Status: Active, Description:  prostate ~2014     Family History: No Family History items are recorded  in the problem list.      Social History:   Social Substance History:  ·  Social History denies smoking, alcohol and drug use   ·  Smoking Status never smoker (1)   ·  Tobacco Use  denies   ·  Alcohol Use denies   ·  Drug Use denies            Vitals and Measurements:   Vitals: Temp: 36.4  HR: 71  RR: 18  BP: 139/63  SPO2%:   91   Measurements: HT(cm): 173.5  WT(kg): 108.8  BSA:  2.28  BMI:  36.1      Physical Exam:      Constitutional: overwt middle aged man, no acute  distress   Eyes: clear sclera, conjunctival normal, pupils equal   ENMT: mucous membranes moist, no apparent injury,  no lesions seen   Head/Neck: Neck supple, no apparent injury, thyroid  without mass or tenderness, No JVD, trachea midline, no bruits   Respiratory/Thorax: good chest expansion, hollow  to percussion, normal tactile fremitous   Cardiovascular: Regular, rate and rhythm, no murmurs,  +2 radial and dorsalis pedis pulses.  He has trace edema bilaterally   Gastrointestinal: Nondistended, soft, non-tender,  no rebound tenderness or guarding.  No hepatosplenomegaly on careful exam, but this exam is limited by body habitus   Genitourinary: No Discharge, vesicles or other abnormalities   Musculoskeletal: ROM intact, no joint swelling, normal  strength   Extremities: normal extremities, see cardiat   Neurological: alert and oriented x3, intact senses,  motor, response and reflexes, normal strength   Lymphatic: No significant cervical, supraclavicular,  axillary lymphadenopathy   Psychological: Appropriate mood and behavior   Skin: Warm and dry, no lesions, no rashes         Lab Results:           Assessment and Plan:       1. Anemia with MDS     The patient was referred to me by Dr. Garrison for further evaluation and management of anemia with myelodysplastic syndrome on 08/04/2021.      The patient is a 61 year old with a past medical history significant for prostate cancer, cerebral aneurysm, CAD,  HTN, HLD, COPD/asthma, fluid retention, diverticulosis, umbilical hernia, arthritis and low back pain. The patient has a history of MDS  and has been diagnosed in the past. He has been monitoring his CBC with his primary care  physician, and a recent change in his WBC and slight anemia has prompted a re-referral to hematology - where we completed a repeat bone marrow biopsy that continued  to show low grade disease, with the diagnosis of myelodysplastic syndrome due to history of Damian t(1;7) leading to equivalent loss of 7q.  The patient was being treated at [facility] but has moved to the area and is choosing to be treated here at [place].  Physical exam was within normal limits. I reviewed the lab data with the patient. CBC obtained on 07/23/2021 revealed WBC 3.5, HGB 9.3, HCT 32.5, , Neut 1.15. I had a detailed discussion with the patient and explained to him the pathophysiology  of myelodysplastic syndrome. Given that he has had this syndrome for a few years, the patient is well versed on his condition and understanding of the consequences and treatment for MDS. I feel that it would be prudent to rule out any other cause of the  anemia such as hemolysis vs others. I recommended baseline studies and  US of the abdomen. Return in 1 week.      The patient had come for a follow up on 03/29/2022 regarding his history of multifactorial anemia M7Q-. Physical exam was within normal limits. I reviewed th lab data with the patient. CBC obtained on 03/25/2022 revealed WBC 3.7, HGB 13.1, HCT 39.8, PLT  123, Neut 1.66.   Iron saturation at 7% and ferritin at 17 ng/ml suggesting ELAINA component. Oral iron has been held at this time. Reassess in 3 months.      The patient and his wife had come for follow-up of anemia on May 1, 2023, regarding history of myelodysplastic syndrome.  She underwent aortic valve replacement and received 2 units of packed red blood cell transfusion in March 2023.  Initially did very  well but is now beginning to feel weak and tired.  I have recommended Ferrex 150 mg p.o. half an hour after food daily.  I explained to the patient and family that even after iron replacement therapy if he continues anemic would recommend  initiating Procrit  the patient and family are agreeable will now return in 4 weeks.     The patient had come for follow-up on June 5, 2023 regarding history of multifactorial anemia including myelodysplastic syndrome 7 q. minus.  The patient underwent aortic valve replacement in March 2023 did receive 2 units of packed red blood cell transfusions.   Initially felt better but is now beginning to feel weak and tired.  Further evaluation revealed hemoglobin down to 9.6 g/dL and iron saturation down to 6%.  Most likely source of iron deficiency is blood loss during surgery.  I have recommended intravenous  Feraheme 510 mg/week x 2 weeks effect side effects explained the patient understood appreciated all the details provided and was grateful.  Return in 8 weeks.  Will consider Procrit if anemia persists.      The patient and his wife had come for follow-up on January 29, 2024 regarding history of multifactorial anemia including iron deficiency component myelodysplastic syndrome 7 q. minus.  The patient underwent aortic valve replacement in March 2023 subsequently  hemoglobin decreased and iron saturation was down to 9%.  The patient was given a benefit of doubt and received intravenous Feraheme.  Feels better but is not back to baseline.  I have recommended reevaluation of anemia in 3 months and reassess.  Keeping  in mind that the patient has underlying myelodysplastic syndrome.  CBC on January 19, 2024 revealed hemoglobin 11.9 g/dL.  Iron saturation is 10%.  I have given him a benefit of doubt and recommended ferrous sulfate 325 mg p.o. half an hour after food daily.  The patient declined 3 times a day.  The patient to discontinue iron after 1 more month of therapy.  In the past GI evaluation did not reveal obvious source of bleeding.  Prescription sent.  Return in 3 months.  The patient and his wife understood appreciated all the  details provided and were grateful.    The patient and his wife had come for follow-up  on July 29, 2024 regarding history of multifactorial anemia including iron deficiency component myelodysplastic syndrome 7 q. minus.  The patient was placed on oral iron replacement therapy after surgery for aortic valve placement.  Approximately 6 months ago the patient's hemoglobin was 9 g/dL.  His hemoglobin has slowly improved up to 11.9 g/dL in January 2024.  Oral iron replacement was held.  The patient is beginning to feel better.  CBC today revealed hemoglobin 13.4 g/dL  I had a long discussion with the patient and his wife explained to them about pathophysiology of myelodysplastic syndrome and elevation iron deficiency component.  The patient does not want to take iron replacement therapy for now.  Requested reassessment in 6 months.    The patient and his wife had come for follow-up on January 27,2025 regarding history of multifactorial anemia including iron deficiency component myelodysplastic syndrome 7 q. minus.  The patient was placed on oral iron replacement therapy after surgery for aortic valve placement.  Approximately 6 months ago the patient's hemoglobin was 9 g/dL.  His hemoglobin has slowly improved up to 13.4 g/dL in July2024.  Oral iron replacement was held.  The patient is asymptomatic.  CBC on January 21, 2025 revealed WBC 3.7, hemoglobin 11.6 g/dL, platelets 2 26,000/mm³.  Serum iron 52 mcg/dL TIBC 357 mcg/dL saturation 15%.  Previous hemoglobin in July 2024 was 13.4 g/dL.  Recommend Ferrex/Iferex 150 mg p.o. half an hour after food daily.  Reassess in 6 months.    The patient and his wife had come for follow-up on August 4,2025 regarding history of multifactorial anemia including iron deficiency component myelodysplastic syndrome 7 q. minus.  A colonoscopy on March 12, 2025 revealed multiple benign colonic polyps.  Underwent hernia repair surgery on July 7, 2025.  Did have postop bleed requiring 2 units of packed red blood cell transfusion, 1 unit of FFP and 1 unit of platelets.  The  patient is currently receiving oral iron replacement therapy.  Feeling better.  CBC revealed WBC 12.4 g/dL.  Iron indicis in reference range.  Hold oral iron after current supply is over reassess in 3 months.     2. Prostate cancer  - Followed clinically by PCP, oncology and urology services   - Torsemide 10 mg PO QD  - Lasix 20 mg PO QD      3. Cerebral aneurysm  - S/p cerebral aneurysm repair   - Followed clinically by cardiovascular and neurology services.   -Aspirin 81 mg PO QD      4. CAD   - Followed clinically by PCP, cardiology and cardiovascular services.  Aspirin 81 mg PO QD      5. HTN    - Followed clinically by PCP, cardiology.   - Losartan 100 mg PO QD      6. HLD  - Atorvastatin 20 mg PO QD     7. COPD/asthma  - Ventolin HFA 90 mcg/inh 2 puff inhaled 4 times day     8. Fluid retention   - Lasix 20 mg PO QD   - Torsemide 10 mg PO QD      9. Diverticulosis   - Followed clinically by PCP and GI services.      10. Umbilical hernia  - Followed clinically by PCP and GI services.      11. Arthritis      12. Low back pain   -Oxycodone- acetaminophen 7.5-325 mg PO BID

## 2025-08-05 ENCOUNTER — APPOINTMENT (OUTPATIENT)
Dept: SURGERY | Facility: CLINIC | Age: 65
End: 2025-08-05
Payer: COMMERCIAL

## 2025-08-05 VITALS
WEIGHT: 249.2 LBS | HEART RATE: 72 BPM | OXYGEN SATURATION: 92 % | TEMPERATURE: 97.7 F | SYSTOLIC BLOOD PRESSURE: 142 MMHG | RESPIRATION RATE: 17 BRPM | BODY MASS INDEX: 36.91 KG/M2 | HEIGHT: 69 IN | DIASTOLIC BLOOD PRESSURE: 88 MMHG

## 2025-08-05 DIAGNOSIS — K43.9 VENTRAL HERNIA WITHOUT OBSTRUCTION OR GANGRENE: Primary | ICD-10-CM

## 2025-08-05 PROCEDURE — 3077F SYST BP >= 140 MM HG: CPT | Performed by: SURGERY

## 2025-08-05 PROCEDURE — 3079F DIAST BP 80-89 MM HG: CPT | Performed by: SURGERY

## 2025-08-05 PROCEDURE — 1126F AMNT PAIN NOTED NONE PRSNT: CPT | Performed by: SURGERY

## 2025-08-05 PROCEDURE — 1111F DSCHRG MED/CURRENT MED MERGE: CPT | Performed by: SURGERY

## 2025-08-05 PROCEDURE — 1159F MED LIST DOCD IN RCRD: CPT | Performed by: SURGERY

## 2025-08-05 PROCEDURE — 4010F ACE/ARB THERAPY RXD/TAKEN: CPT | Performed by: SURGERY

## 2025-08-05 PROCEDURE — 99214 OFFICE O/P EST MOD 30 MIN: CPT | Performed by: SURGERY

## 2025-08-05 PROCEDURE — 3008F BODY MASS INDEX DOCD: CPT | Performed by: SURGERY

## 2025-08-05 ASSESSMENT — ENCOUNTER SYMPTOMS
CONSTIPATION: 0
COLOR CHANGE: 0
COUGH: 0
CHILLS: 0
DIZZINESS: 0
BLOOD IN STOOL: 0
HEADACHES: 0
SPEECH DIFFICULTY: 0
LIGHT-HEADEDNESS: 0
ABDOMINAL PAIN: 0
NAUSEA: 0
VOMITING: 0
CONFUSION: 0
DIFFICULTY URINATING: 0
DIARRHEA: 0
SHORTNESS OF BREATH: 0
WHEEZING: 0
FEVER: 0
TROUBLE SWALLOWING: 0
ABDOMINAL DISTENTION: 0
JOINT SWELLING: 0
ARTHRALGIAS: 0
UNEXPECTED WEIGHT CHANGE: 0
SLEEP DISTURBANCE: 0

## 2025-08-05 ASSESSMENT — PAIN SCALES - GENERAL: PAINLEVEL_OUTOF10: 0-NO PAIN

## 2025-08-05 NOTE — PROGRESS NOTES
"Subjective   Patient ID: Jose Gaviria \"Crow\" is a 65 y.o. male who presents for Post-op (S/p ventral hernia repair).  HPI  63 YO M BMI 39 prostate cancer, MDS, cerebral aneurysm, CAD,  HTN, HLD, COPD/asthma, fluid retention, diverticulosis, referred for ventral hernias. Prior TAVR. Patient has also been having issues with constipation following initiation of iron supplements and pain medications, seeing GI. Possible small bowel bleed / anemia?      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 He did this work in his twenties and forties.     He experiences significant stomach pain, described as a 'bad stomach ache' that comes and goes. He seems to indicate it is really bad and worsening. The pain is sometimes located in the center of his abdomen and can fluctuate between the upper and lower abdomen, occasionally near a previous hernia site which was repaired remotely with superficial infection. The pain is alleviated when he lies down. No nausea or vomiting is associated with the pain, and his appetite remains unaffected. Underwent CT scan with PCP which demonstrated multiple abdominal hernias, possible sigmoid diverticulitis (was given augmentin). Denies fevers, chills, nausea/vomiting, having occasional LLQ pain. Underwent EGD/colonoscopy 03/12/25. C0M1 Schroeder's esophagus; electronic chromoendoscopy (NBI) was used; performed targeted cold forceps biopsies. Grade A esophagitis.One 10 mm polyp; lift performed; removed by EMR. 6 polyps were removed with cold snare. Diverticulosis of mild severity in the sigmoid colon.    CT ABD PEL 04/29/2025. Hernia width 7.5 cm and 4.5 cm. 11 cm total length. Incarcerated fat. Weakness around umbilicus too.    Manageable chronic pain, using hydrocodone-acetaminophen 5-325 mg orally 3 times daily as needed. Will make pain control tough after surgery.    Patient underwent open upper abdomen retrorectus repair " 07/07/2025. Skin incision made from right of umbilicus to 15 cm superior of umbilicus. 15+ cm supraumbilical sac on 4 cm defect with incarcerated omentum and mesentery of adjacent small bowel. 6 cm defect width in epigastrium with smaller sac. Smaller periumbilical hernia at left side. Prior mesh repair, appeared to be preperitoneal under umbilicus. Partially removed for flap mobilization.  12 cm total defects length. 20 x 20 cm Synecore mesh placed in retrorectus space.  Fascia closed off tension. RLQ drain over mesh. LLQ drain cut to half length in subcutaneous tissue.     Patient had two ABDIEL drains placed during procedure; RLQ drain over mesh and LLQ drain cut to half length in large hernia sac. The left drain was removed prior to discharge, but the patient will be discharged with the right drain in place.      Post-operative course complicated by bleeding primarily from RLQ mesh drain overnight on POD0, likely related to MDS and thrombocytopenia as operative field was dry during mesh placement. TXA x 2 and ICU transfer for hypotension. In total, patient received 2u pRBC, 1u FFP and 1u PLT. Hematology consulted. Lactemia resolved. And Hgb was stable for >48 hours prior to discharge. Transferred to telemetry floor from ICU on 07/09/2025. Pt had an ileus that resolved, but was noted to have multiple episodes of diarrhea, so medicine team ordered a C diff which was negative. He tolerated diet advancement and pain was well controlled on PO medication.      Of note, PT/OT worked with patient and noted acute drop in SpO2 during ambulation, but patient was asymptomatic and improved with rest. He should follow-up with his PCP to discuss if home O2, medication changes, or further testing is indicated. Prescriptions for gabapentin sent at discharge. Pt should take his home norco as needed, as well as tylenol and ibuprofen. He will follow-up with Dr. Christopher later this week for drain removal. He should also follow-up with  Dr. Pierce for continued monitoring of his blood counts.     Returned to clinic 07/17/2025. No issues. Pain that prompted surgery is gone. Some firmness in midline, likely hematoma. Minimal bloody drainage from right drain. 35-40 ml a day.    Returned to clinic 07/21/2025. Drain output serous and less than 10 last three days. Removed. Keflex prescribed due to history of mesh infection in prior repairs and hematoma. No signs of infection presently.    Returned to clinic 08/05/2025. Doing well. Had left sided pain last week which resolved. No issues with wound. Pain in abdomen is gone. Feeling excellent.    Review of Systems   Constitutional:  Negative for chills, fever and unexpected weight change.   HENT:  Negative for congestion and trouble swallowing.    Respiratory:  Negative for cough, shortness of breath and wheezing.    Cardiovascular:  Negative for chest pain.   Gastrointestinal:  Negative for abdominal distention, abdominal pain, blood in stool, constipation, diarrhea, nausea and vomiting.   Genitourinary:  Negative for difficulty urinating.   Musculoskeletal:  Negative for arthralgias and joint swelling.   Skin:  Negative for color change.   Neurological:  Negative for dizziness, speech difficulty, light-headedness and headaches.   Psychiatric/Behavioral:  Negative for confusion and sleep disturbance.        Objective   Physical Exam  Constitutional:       General: He is awake.      Appearance: Normal appearance.   HENT:      Head: Normocephalic and atraumatic.      Nose: Nose normal.      Mouth/Throat:      Mouth: Mucous membranes are moist.     Eyes:      Pupils: Pupils are equal, round, and reactive to light.     Neck:      Thyroid: No thyroid mass.      Trachea: Phonation normal.     Cardiovascular:      Rate and Rhythm: Normal rate and regular rhythm.   Pulmonary:      Effort: Pulmonary effort is normal. No respiratory distress.      Breath sounds: Normal air entry. No decreased breath sounds, wheezing,  rhonchi or rales.   Abdominal:      General: Bowel sounds are normal. There is no distension.      Palpations: Abdomen is soft.      Tenderness: There is no abdominal tenderness.      Comments: Soft abdomen. Incision CDI still under glue. Drain sites closed.     Musculoskeletal:      Cervical back: Neck supple.      Right lower leg: No edema.      Left lower leg: No edema.     Skin:     General: Skin is warm.      Capillary Refill: Capillary refill takes less than 2 seconds.     Neurological:      General: No focal deficit present.      Mental Status: He is alert and oriented to person, place, and time. Mental status is at baseline.      Cranial Nerves: Cranial nerves 2-12 are intact.      Motor: Motor function is intact.     Psychiatric:         Attention and Perception: Attention and perception normal.         Mood and Affect: Mood normal.         Speech: Speech normal.         Behavior: Behavior normal.         Assessment/Plan   Problem List Items Addressed This Visit    None      Continue abdominal binder when out of bed.  Return in 3 months.  Call if any issues.         Tin Christopher MD PhD 08/05/25 8:52 AM

## 2025-08-06 DIAGNOSIS — I51.89 DIASTOLIC DYSFUNCTION: ICD-10-CM

## 2025-08-06 RX ORDER — SPIRONOLACTONE 25 MG/1
25 TABLET ORAL DAILY
Qty: 90 TABLET | Refills: 3 | Status: SHIPPED | OUTPATIENT
Start: 2025-08-06

## 2025-08-08 ENCOUNTER — APPOINTMENT (OUTPATIENT)
Dept: CARDIOLOGY | Facility: CLINIC | Age: 65
End: 2025-08-08
Payer: COMMERCIAL

## 2025-08-18 ENCOUNTER — APPOINTMENT (OUTPATIENT)
Dept: CARDIOLOGY | Facility: CLINIC | Age: 65
End: 2025-08-18
Payer: COMMERCIAL

## 2025-08-18 VITALS
HEIGHT: 69 IN | DIASTOLIC BLOOD PRESSURE: 80 MMHG | HEART RATE: 64 BPM | SYSTOLIC BLOOD PRESSURE: 120 MMHG | BODY MASS INDEX: 36.88 KG/M2 | OXYGEN SATURATION: 96 % | WEIGHT: 249 LBS

## 2025-08-18 DIAGNOSIS — Z95.2 S/P TAVR (TRANSCATHETER AORTIC VALVE REPLACEMENT): Primary | ICD-10-CM

## 2025-08-18 DIAGNOSIS — I25.10 CORONARY ARTERY CALCIFICATION SEEN ON CT SCAN: ICD-10-CM

## 2025-08-18 DIAGNOSIS — R06.09 DOE (DYSPNEA ON EXERTION): ICD-10-CM

## 2025-08-18 DIAGNOSIS — I35.0 AORTIC VALVE STENOSIS, ETIOLOGY OF CARDIAC VALVE DISEASE UNSPECIFIED: ICD-10-CM

## 2025-08-18 PROCEDURE — 1160F RVW MEDS BY RX/DR IN RCRD: CPT | Performed by: PHYSICIAN ASSISTANT

## 2025-08-18 PROCEDURE — 3074F SYST BP LT 130 MM HG: CPT | Performed by: PHYSICIAN ASSISTANT

## 2025-08-18 PROCEDURE — 99214 OFFICE O/P EST MOD 30 MIN: CPT | Performed by: PHYSICIAN ASSISTANT

## 2025-08-18 PROCEDURE — 4010F ACE/ARB THERAPY RXD/TAKEN: CPT | Performed by: PHYSICIAN ASSISTANT

## 2025-08-18 PROCEDURE — 1159F MED LIST DOCD IN RCRD: CPT | Performed by: PHYSICIAN ASSISTANT

## 2025-08-18 PROCEDURE — 3008F BODY MASS INDEX DOCD: CPT | Performed by: PHYSICIAN ASSISTANT

## 2025-08-18 PROCEDURE — 3079F DIAST BP 80-89 MM HG: CPT | Performed by: PHYSICIAN ASSISTANT

## 2025-08-18 PROCEDURE — 99212 OFFICE O/P EST SF 10 MIN: CPT

## 2025-08-22 DIAGNOSIS — R09.81 NASAL CONGESTION: ICD-10-CM

## 2025-08-22 RX ORDER — FLUTICASONE PROPIONATE 50 MCG
2 SPRAY, SUSPENSION (ML) NASAL DAILY
Qty: 16 G | Refills: 1 | Status: SHIPPED | OUTPATIENT
Start: 2025-08-22

## 2025-08-25 DIAGNOSIS — G89.4 CHRONIC PAIN SYNDROME: ICD-10-CM

## 2025-08-25 DIAGNOSIS — M54.50 CHRONIC BILATERAL LOW BACK PAIN WITHOUT SCIATICA: Chronic | ICD-10-CM

## 2025-08-25 DIAGNOSIS — G89.29 CHRONIC BILATERAL LOW BACK PAIN WITHOUT SCIATICA: Chronic | ICD-10-CM

## 2025-08-25 RX ORDER — HYDROCODONE BITARTRATE AND ACETAMINOPHEN 5; 325 MG/1; MG/1
2 TABLET ORAL 3 TIMES DAILY PRN
Qty: 180 TABLET | Refills: 0 | Status: SHIPPED | OUTPATIENT
Start: 2025-08-25 | End: 2025-09-24

## 2025-10-20 ENCOUNTER — APPOINTMENT (OUTPATIENT)
Dept: PRIMARY CARE | Facility: CLINIC | Age: 65
End: 2025-10-20
Payer: COMMERCIAL

## 2025-11-04 ENCOUNTER — APPOINTMENT (OUTPATIENT)
Dept: SURGERY | Facility: CLINIC | Age: 65
End: 2025-11-04
Payer: COMMERCIAL

## 2025-11-11 ENCOUNTER — APPOINTMENT (OUTPATIENT)
Dept: GASTROENTEROLOGY | Facility: CLINIC | Age: 65
End: 2025-11-11
Payer: COMMERCIAL

## 2026-05-05 ENCOUNTER — APPOINTMENT (OUTPATIENT)
Dept: GASTROENTEROLOGY | Facility: CLINIC | Age: 66
End: 2026-05-05
Payer: COMMERCIAL

## (undated) DEVICE — Device

## (undated) DEVICE — BINDER, ABDOMINAL, 4 PANEL, 12 X 75-84IN

## (undated) DEVICE — CATHETER TRAY, SURESTEP, 16FR, URINE METER W/STATLOCK

## (undated) DEVICE — SPONGE, LAP, XRAY DECT, 18IN X 18IN, W/LOOP, STERILE

## (undated) DEVICE — SLEEVE, VASO PRESS, CALF GARMENT, MEDIUM, GREEN

## (undated) DEVICE — DRAIN, CHANNEL, ROUND, FULL FLUTE 19F

## (undated) DEVICE — EVACUATOR, WOUND, SUCTION, CLOSED, JACKSON-PRATT, 100 CC, SILICONE

## (undated) DEVICE — MARKER, SKIN, REGULAR TIP, W/FLEXI-RULER